# Patient Record
Sex: FEMALE | Race: WHITE | NOT HISPANIC OR LATINO | Employment: UNEMPLOYED | URBAN - METROPOLITAN AREA
[De-identification: names, ages, dates, MRNs, and addresses within clinical notes are randomized per-mention and may not be internally consistent; named-entity substitution may affect disease eponyms.]

---

## 2019-08-26 ENCOUNTER — TELEPHONE (OUTPATIENT)
Dept: URGENT CARE | Facility: CLINIC | Age: 54
End: 2019-08-26

## 2019-08-26 NOTE — TELEPHONE ENCOUNTER
Patient came in for TB read.  Pt waited 5 minutes and states she is going to sign her own form and left.      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

## 2020-08-05 ENCOUNTER — OFFICE VISIT (OUTPATIENT)
Dept: INTERNAL MEDICINE | Facility: CLINIC | Age: 55
End: 2020-08-05

## 2020-08-05 VITALS
HEIGHT: 64 IN | WEIGHT: 187 LBS | DIASTOLIC BLOOD PRESSURE: 90 MMHG | HEART RATE: 65 BPM | TEMPERATURE: 97.7 F | RESPIRATION RATE: 15 BRPM | SYSTOLIC BLOOD PRESSURE: 124 MMHG | BODY MASS INDEX: 31.92 KG/M2 | OXYGEN SATURATION: 97 %

## 2020-08-05 DIAGNOSIS — E11.9 TYPE 2 DIABETES MELLITUS WITHOUT COMPLICATION, WITHOUT LONG-TERM CURRENT USE OF INSULIN (HCC): Primary | ICD-10-CM

## 2020-08-05 DIAGNOSIS — I10 ESSENTIAL HYPERTENSION: ICD-10-CM

## 2020-08-05 DIAGNOSIS — Z23 NEED FOR PNEUMOCOCCAL VACCINATION: ICD-10-CM

## 2020-08-05 DIAGNOSIS — B00.1 HERPES LABIALIS: ICD-10-CM

## 2020-08-05 DIAGNOSIS — Z11.59 ENCOUNTER FOR HEPATITIS C SCREENING TEST FOR LOW RISK PATIENT: ICD-10-CM

## 2020-08-05 DIAGNOSIS — Z83.49 FAMILY HISTORY OF THYROID DISEASE IN MOTHER: ICD-10-CM

## 2020-08-05 LAB
A/C: NORMAL
ANION GAP SERPL CALCULATED.3IONS-SCNC: 13.8 MMOL/L (ref 5–15)
BASOPHILS # BLD AUTO: 0.05 10*3/MM3 (ref 0–0.2)
BASOPHILS NFR BLD AUTO: 0.6 % (ref 0–1.5)
BUN SERPL-MCNC: 9 MG/DL (ref 6–20)
BUN/CREAT SERPL: 12.5 (ref 7–25)
CALCIUM SPEC-SCNC: 9.3 MG/DL (ref 8.6–10.5)
CHLORIDE SERPL-SCNC: 96 MMOL/L (ref 98–107)
CO2 SERPL-SCNC: 27.2 MMOL/L (ref 22–29)
CREAT SERPL-MCNC: 0.72 MG/DL (ref 0.57–1)
DEPRECATED RDW RBC AUTO: 36.3 FL (ref 37–54)
EOSINOPHIL # BLD AUTO: 0.16 10*3/MM3 (ref 0–0.4)
EOSINOPHIL NFR BLD AUTO: 1.8 % (ref 0.3–6.2)
ERYTHROCYTE [DISTWIDTH] IN BLOOD BY AUTOMATED COUNT: 11.8 % (ref 12.3–15.4)
EXPIRATION DATE: NORMAL
EXPIRATION DATE: NORMAL
GFR SERPL CREATININE-BSD FRML MDRD: 85 ML/MIN/1.73
GLUCOSE SERPL-MCNC: 245 MG/DL (ref 65–99)
HBA1C MFR BLD: 8.3 %
HCT VFR BLD AUTO: 40.9 % (ref 34–46.6)
HCV AB SER DONR QL: NORMAL
HGB BLD-MCNC: 14.6 G/DL (ref 12–15.9)
IMM GRANULOCYTES # BLD AUTO: 0.04 10*3/MM3 (ref 0–0.05)
IMM GRANULOCYTES NFR BLD AUTO: 0.4 % (ref 0–0.5)
LYMPHOCYTES # BLD AUTO: 2.3 10*3/MM3 (ref 0.7–3.1)
LYMPHOCYTES NFR BLD AUTO: 25.6 % (ref 19.6–45.3)
Lab: 1061
Lab: NORMAL
MCH RBC QN AUTO: 30.3 PG (ref 26.6–33)
MCHC RBC AUTO-ENTMCNC: 35.7 G/DL (ref 31.5–35.7)
MCV RBC AUTO: 84.9 FL (ref 79–97)
MONOCYTES # BLD AUTO: 0.64 10*3/MM3 (ref 0.1–0.9)
MONOCYTES NFR BLD AUTO: 7.1 % (ref 5–12)
NEUTROPHILS NFR BLD AUTO: 5.78 10*3/MM3 (ref 1.7–7)
NEUTROPHILS NFR BLD AUTO: 64.5 % (ref 42.7–76)
NRBC BLD AUTO-RTO: 0 /100 WBC (ref 0–0.2)
PLATELET # BLD AUTO: 369 10*3/MM3 (ref 140–450)
PMV BLD AUTO: 11 FL (ref 6–12)
POC CREATININE URINE: 200
POC MICROALBUMIN URINE: 150
POTASSIUM SERPL-SCNC: 3.4 MMOL/L (ref 3.5–5.2)
RBC # BLD AUTO: 4.82 10*6/MM3 (ref 3.77–5.28)
SODIUM SERPL-SCNC: 137 MMOL/L (ref 136–145)
T4 FREE SERPL-MCNC: 1.27 NG/DL (ref 0.93–1.7)
TSH SERPL DL<=0.05 MIU/L-ACNC: 0.41 UIU/ML (ref 0.27–4.2)
WBC # BLD AUTO: 8.97 10*3/MM3 (ref 3.4–10.8)

## 2020-08-05 PROCEDURE — 80048 BASIC METABOLIC PNL TOTAL CA: CPT | Performed by: PHYSICIAN ASSISTANT

## 2020-08-05 PROCEDURE — 82044 UR ALBUMIN SEMIQUANTITATIVE: CPT | Performed by: PHYSICIAN ASSISTANT

## 2020-08-05 PROCEDURE — 84439 ASSAY OF FREE THYROXINE: CPT | Performed by: PHYSICIAN ASSISTANT

## 2020-08-05 PROCEDURE — 99204 OFFICE O/P NEW MOD 45 MIN: CPT | Performed by: PHYSICIAN ASSISTANT

## 2020-08-05 PROCEDURE — 83036 HEMOGLOBIN GLYCOSYLATED A1C: CPT | Performed by: PHYSICIAN ASSISTANT

## 2020-08-05 PROCEDURE — 90732 PPSV23 VACC 2 YRS+ SUBQ/IM: CPT | Performed by: PHYSICIAN ASSISTANT

## 2020-08-05 PROCEDURE — 85025 COMPLETE CBC W/AUTO DIFF WBC: CPT | Performed by: PHYSICIAN ASSISTANT

## 2020-08-05 PROCEDURE — 36415 COLL VENOUS BLD VENIPUNCTURE: CPT | Performed by: PHYSICIAN ASSISTANT

## 2020-08-05 PROCEDURE — 84443 ASSAY THYROID STIM HORMONE: CPT | Performed by: PHYSICIAN ASSISTANT

## 2020-08-05 PROCEDURE — 86803 HEPATITIS C AB TEST: CPT | Performed by: PHYSICIAN ASSISTANT

## 2020-08-05 PROCEDURE — 90471 IMMUNIZATION ADMIN: CPT | Performed by: PHYSICIAN ASSISTANT

## 2020-08-05 RX ORDER — DILTIAZEM HYDROCHLORIDE 300 MG/1
300 CAPSULE, COATED, EXTENDED RELEASE ORAL DAILY
Qty: 90 CAPSULE | Refills: 1 | Status: SHIPPED | OUTPATIENT
Start: 2020-08-05 | End: 2021-01-07 | Stop reason: SDUPTHER

## 2020-08-05 RX ORDER — DILTIAZEM HYDROCHLORIDE 300 MG/1
300 CAPSULE, COATED, EXTENDED RELEASE ORAL DAILY
COMMUNITY
End: 2020-08-05 | Stop reason: SDUPTHER

## 2020-08-05 RX ORDER — VENLAFAXINE HYDROCHLORIDE 150 MG/1
150 CAPSULE, EXTENDED RELEASE ORAL DAILY
Status: ON HOLD | COMMUNITY
End: 2020-12-03 | Stop reason: SDUPTHER

## 2020-08-05 RX ORDER — GLIPIZIDE 10 MG/1
10 TABLET, FILM COATED, EXTENDED RELEASE ORAL 2 TIMES DAILY
COMMUNITY
End: 2020-09-04 | Stop reason: SDUPTHER

## 2020-08-05 RX ORDER — VALACYCLOVIR HYDROCHLORIDE 500 MG/1
500 TABLET, FILM COATED ORAL 2 TIMES DAILY
Qty: 6 TABLET | Refills: 1 | Status: SHIPPED | OUTPATIENT
Start: 2020-08-05 | End: 2020-08-08

## 2020-08-05 RX ORDER — LOSARTAN POTASSIUM 25 MG/1
25 TABLET ORAL DAILY
Qty: 90 TABLET | Refills: 1 | Status: SHIPPED | OUTPATIENT
Start: 2020-08-05 | End: 2020-10-26 | Stop reason: DRUGHIGH

## 2020-08-05 RX ORDER — LOSARTAN POTASSIUM 25 MG/1
25 TABLET ORAL DAILY
COMMUNITY
End: 2020-08-05 | Stop reason: SDUPTHER

## 2020-08-05 NOTE — PROGRESS NOTES
Chief Complaint   Patient presents with   • Establish Care     Refill medication, Referral to Endocrinology   • Rash     x2 months, oral, nasal       Subjective       History of Present Illness     Rosanne Araya is a 54 y.o. female. She presents as a new patient to establish care. Current concerns include Type II DM, HTN, and fever blisters. Regarding Type II DM, pt is taking glipizide and metformin as directed. She denies headaches or foggy-headedness, vision change, abdominal pain, N/V/D, frequent urination or changes in urination or BMs, or neuropathy. Most recent HbA1c at 10.7 about 6 months ago, per pt. She would like a referral to a new endocrinologist. No new concerns.     Pt with HTN, currently taking losartan and diltiazem as directed. She does not routinely check her BP at home. She denies chest pain, SOA, HA, vision change or swelling of extremities. No new concerns.     She does have a new issue of fever blisters. Pt has had fever blisters in the past but not for several years. In the last 2 months she has had several fever blisters at her mouth as well as at L nasolabial fold. She has taken acyclovir in the past which improved her sx.     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 Lives at home with .       Are you currently seeing any other doctors or specialists? No   Are you currently taking any OTC medications or herbal medications? Vit D, Zinc, multivitamin     Sleep: 7 hours/ night   Diet: fairly healthy, per pt  Exercise: walking 2 miles daily     Most recent colonoscopy: 2018, normal per pt  Most recent mammogram: December 2019, [facility]  Most recent pap smear: 2016  First day of last menses: regular, consistent, LMP 7/25/2020. Has IUD in place     Regular dental visits: No  Regular eye exams: No, wears glasses only       The following portions of the patient's history were reviewed and updated as appropriate: allergies, current medications, past family  history, past medical history, past social history, past surgical history and problem list.    No Known Allergies  Social History     Tobacco Use   • Smoking status: Never Smoker   • Smokeless tobacco: Never Used   Substance Use Topics   • Alcohol use: Never     Frequency: Never     Past Surgical History:   Procedure Laterality Date   • ADENOIDECTOMY     • BACK SURGERY      disc slipped   •  SECTION     • TONSILLECTOMY     • WISDOM TOOTH EXTRACTION       Family History   Problem Relation Age of Onset   • Thyroid disease Mother    • Breast cancer Maternal Aunt    • Hypertension Maternal Grandmother    • Diabetes Maternal Grandmother    • Stroke Maternal Grandmother    • Heart attack Maternal Grandmother    • Hypertension Maternal Grandfather    • Diabetes Maternal Grandfather    • Hypertension Paternal Grandmother    • Diabetes Paternal Grandmother    • Heart failure Paternal Grandmother    • Hypertension Paternal Grandfather    • Heart attack Paternal Grandfather          Current Outpatient Medications:   •  dilTIAZem CD (CARDIZEM CD) 300 MG 24 hr capsule, Take 1 capsule by mouth Daily., Disp: 90 capsule, Rfl: 1  •  glipizide (GLUCOTROL XL) 10 MG 24 hr tablet, Take 10 mg by mouth 2 (two) times a day., Disp: , Rfl:   •  losartan (COZAAR) 25 MG tablet, Take 1 tablet by mouth Daily., Disp: 90 tablet, Rfl: 1  •  metFORMIN (GLUCOPHAGE) 1000 MG tablet, Take 1,000 mg by mouth 2 (Two) Times a Day With Meals., Disp: , Rfl:   •  venlafaxine XR (EFFEXOR-XR) 150 MG 24 hr capsule, Take 150 mg by mouth Daily., Disp: , Rfl:   •  valACYclovir (Valtrex) 500 MG tablet, Take 1 tablet by mouth 2 (Two) Times a Day for 3 days., Disp: 6 tablet, Rfl: 1    Patient Active Problem List   Diagnosis   • Type 2 diabetes mellitus without complication, without long-term current use of insulin (CMS/[facility])   • Essential hypertension   • Herpes labialis   • Family history of thyroid disease in mother       Review of Systems    Constitutional: Negative for chills, fatigue and fever.   HENT: Negative for congestion, ear pain and sore throat.    Eyes: Negative for pain and visual disturbance.   Respiratory: Negative for cough, shortness of breath and wheezing.    Cardiovascular: Negative for chest pain, palpitations and leg swelling.   Gastrointestinal: Negative for abdominal pain, diarrhea, nausea and vomiting.   Endocrine: Negative for polydipsia and polyuria.   Genitourinary: Negative for dysuria and hematuria.   Musculoskeletal: Negative for back pain.   Skin: Negative for rash.        +fever blisters   Allergic/Immunologic: Negative for immunocompromised state.   Neurological: Negative for dizziness, syncope, weakness, numbness and headache.   Psychiatric/Behavioral: Positive for depressed mood (well-controlled). The patient is not nervous/anxious.        Objective   Vitals:    08/05/20 1020   BP: 124/90   Pulse: 65   Resp: 15   Temp: 97.7 °F (36.5 °C)   SpO2: 97%     Physical Exam   Constitutional: She appears well-developed and well-nourished.   HENT:   Head: Normocephalic and atraumatic.   Right Ear: Tympanic membrane, external ear and ear canal normal.   Left Ear: Tympanic membrane, external ear and ear canal normal.   Mouth/Throat: Oropharynx is clear and moist and mucous membranes are normal.   +fever blisters x2 at upper lip, healing blister at L nasolabial fold.    Eyes: Conjunctivae are normal.   Neck: Neck supple. Carotid bruit is not present. No thyromegaly present.   Cardiovascular: Normal rate, regular rhythm and intact distal pulses.   No murmur heard.  Pulmonary/Chest: Effort normal and breath sounds normal. She has no wheezes. She has no rales.   Abdominal: Soft. Bowel sounds are normal. She exhibits no distension and no mass. There is no hepatosplenomegaly. There is no tenderness.   Lymphadenopathy:     She has no cervical adenopathy.   Skin: No rash noted.   Psychiatric: She has a normal mood and affect. Her behavior  is normal.             Assessment/Plan   Rosanne was seen today for establish care and rash.    Diagnoses and all orders for this visit:    Type 2 diabetes mellitus without complication, without long-term current use of insulin (CMS/[facility])  -     Basic Metabolic Panel  -     CBC & Differential  -     POC Glycosylated Hemoglobin (Hb A1C)  -     POC Microalbumin  -     CBC Auto Differential  -     Ambulatory Referral to Endocrinology    Essential hypertension  -     dilTIAZem CD (CARDIZEM CD) 300 MG 24 hr capsule; Take 1 capsule by mouth Daily.  -     losartan (COZAAR) 25 MG tablet; Take 1 tablet by mouth Daily.  -     Basic Metabolic Panel  -     CBC & Differential  -     TSH  -     T4, Free  -     CBC Auto Differential    Herpes labialis  -     valACYclovir (Valtrex) 500 MG tablet; Take 1 tablet by mouth 2 (Two) Times a Day for 3 days.    Need for pneumococcal vaccination  -     Pneumococcal Polysaccharide Vaccine 23-Valent Greater Than or Equal To 3yo Subcutaneous / IM    Family history of thyroid disease in mother  -     TSH  -     T4, Free    Encounter for hepatitis C screening test for low risk patient  -     Hepatitis C Antibody      Will request most recent mammogram and colonoscopy reports for our records.   Continue all routine medications.  Valtrex as directed for PRN use with fever blisters.   PPSV23 updated today.   Further plans after review of labs.            Return in about 4 months (around 12/5/2020) for Annual physical- fasting labs.

## 2020-08-06 ENCOUNTER — TELEPHONE (OUTPATIENT)
Dept: INTERNAL MEDICINE | Facility: CLINIC | Age: 55
End: 2020-08-06

## 2020-08-06 NOTE — TELEPHONE ENCOUNTER
Please call pt- her A1c has improved to 8.3 which is great news. We will still have her follow up with endocrinology.   Otherwise all labs stable.

## 2020-08-06 NOTE — TELEPHONE ENCOUNTER
Rosanne Araya [phone]  [facility] advising of clinical message listed below. PER FANNIE. Office number given for any questions or concerns, [phone].

## 2020-09-04 DIAGNOSIS — E11.9 TYPE 2 DIABETES MELLITUS WITHOUT COMPLICATION, WITHOUT LONG-TERM CURRENT USE OF INSULIN (HCC): Primary | ICD-10-CM

## 2020-09-04 RX ORDER — GLIPIZIDE 10 MG/1
10 TABLET, FILM COATED, EXTENDED RELEASE ORAL 2 TIMES DAILY
Qty: 60 TABLET | Refills: 1 | Status: SHIPPED | OUTPATIENT
Start: 2020-09-04 | End: 2020-10-26 | Stop reason: SDUPTHER

## 2020-09-04 NOTE — TELEPHONE ENCOUNTER
DELETE AFTER REVIEWING: Telephone encounter to be sent to the clinical pool.  If patient has less than a 3 day supply left, send the encounter HIGH Priority.    Caller: Rosanne Araya    Relationship: Self    Best call back number: [phone]    Medication needed:   Requested Prescriptions     Pending Prescriptions Disp Refills   • glipizide (GLUCOTROL XL) 10 MG 24 hr tablet       Sig: Take 1 tablet by mouth 2 (two) times a day.   • metFORMIN (GLUCOPHAGE) 1000 MG tablet       Sig: Take 1 tablet by mouth 2 (Two) Times a Day With Meals.       When do you need the refill by: TODAY    What details did the patient provide when requesting the medication: PATIENT STATED HER ENDOCRINE APPOINTMENT WILL NOT BE UNTIL NOV 4TH SO SHE WILL NEED ANOTHER REFIL OF THESE PRESCRIPTIONS    Does the patient have less than a 3 day supply:  [] Yes  [x] No    What is the patient's preferred pharmacy:    [facility] [address] [place] [address] [phone] [place] [phone]

## 2020-10-26 ENCOUNTER — OFFICE VISIT (OUTPATIENT)
Dept: ENDOCRINOLOGY | Facility: CLINIC | Age: 55
End: 2020-10-26

## 2020-10-26 ENCOUNTER — LAB (OUTPATIENT)
Dept: LAB | Facility: HOSPITAL | Age: 55
End: 2020-10-26

## 2020-10-26 VITALS
WEIGHT: 185.6 LBS | BODY MASS INDEX: 32.36 KG/M2 | DIASTOLIC BLOOD PRESSURE: 84 MMHG | HEART RATE: 76 BPM | OXYGEN SATURATION: 98 % | SYSTOLIC BLOOD PRESSURE: 122 MMHG

## 2020-10-26 DIAGNOSIS — E11.29 MICROALBUMINURIA DUE TO TYPE 2 DIABETES MELLITUS (HCC): ICD-10-CM

## 2020-10-26 DIAGNOSIS — E11.65 UNCONTROLLED TYPE 2 DIABETES MELLITUS WITH HYPERGLYCEMIA (HCC): Primary | ICD-10-CM

## 2020-10-26 DIAGNOSIS — R80.9 MICROALBUMINURIA DUE TO TYPE 2 DIABETES MELLITUS (HCC): ICD-10-CM

## 2020-10-26 LAB
ALBUMIN SERPL-MCNC: 4.2 G/DL (ref 3.5–5.2)
ALBUMIN/GLOB SERPL: 1.4 G/DL
ALP SERPL-CCNC: 102 U/L (ref 39–117)
ALT SERPL W P-5'-P-CCNC: 10 U/L (ref 1–33)
ANION GAP SERPL CALCULATED.3IONS-SCNC: 11.9 MMOL/L (ref 5–15)
AST SERPL-CCNC: 17 U/L (ref 1–32)
BILIRUB SERPL-MCNC: 0.3 MG/DL (ref 0–1.2)
BUN SERPL-MCNC: 11 MG/DL (ref 6–20)
BUN/CREAT SERPL: 15.1 (ref 7–25)
CALCIUM SPEC-SCNC: 9.4 MG/DL (ref 8.6–10.5)
CHLORIDE SERPL-SCNC: 98 MMOL/L (ref 98–107)
CHOLEST SERPL-MCNC: 234 MG/DL (ref 0–200)
CO2 SERPL-SCNC: 29.1 MMOL/L (ref 22–29)
CREAT SERPL-MCNC: 0.73 MG/DL (ref 0.57–1)
GFR SERPL CREATININE-BSD FRML MDRD: 83 ML/MIN/1.73
GLOBULIN UR ELPH-MCNC: 3 GM/DL
GLUCOSE BLDC GLUCOMTR-MCNC: 212 MG/DL (ref 70–130)
GLUCOSE SERPL-MCNC: 219 MG/DL (ref 65–99)
HBA1C MFR BLD: 8.7 %
HDLC SERPL-MCNC: 37 MG/DL (ref 40–60)
LDLC SERPL CALC-MCNC: 158 MG/DL (ref 0–100)
LDLC/HDLC SERPL: 4.19 {RATIO}
POTASSIUM SERPL-SCNC: 3.9 MMOL/L (ref 3.5–5.2)
PROT SERPL-MCNC: 7.2 G/DL (ref 6–8.5)
SODIUM SERPL-SCNC: 139 MMOL/L (ref 136–145)
TRIGL SERPL-MCNC: 209 MG/DL (ref 0–150)
VLDLC SERPL-MCNC: 39 MG/DL (ref 5–40)

## 2020-10-26 PROCEDURE — 80061 LIPID PANEL: CPT | Performed by: PHYSICIAN ASSISTANT

## 2020-10-26 PROCEDURE — 99215 OFFICE O/P EST HI 40 MIN: CPT | Performed by: PHYSICIAN ASSISTANT

## 2020-10-26 PROCEDURE — 80053 COMPREHEN METABOLIC PANEL: CPT | Performed by: PHYSICIAN ASSISTANT

## 2020-10-26 PROCEDURE — 83036 HEMOGLOBIN GLYCOSYLATED A1C: CPT | Performed by: PHYSICIAN ASSISTANT

## 2020-10-26 PROCEDURE — 82947 ASSAY GLUCOSE BLOOD QUANT: CPT | Performed by: PHYSICIAN ASSISTANT

## 2020-10-26 RX ORDER — LANCETS 30 GAUGE
EACH MISCELLANEOUS
Qty: 100 EACH | Refills: 11 | Status: SHIPPED | OUTPATIENT
Start: 2020-10-26 | End: 2022-02-08 | Stop reason: SDUPTHER

## 2020-10-26 RX ORDER — GLIPIZIDE 10 MG/1
10 TABLET, FILM COATED, EXTENDED RELEASE ORAL 2 TIMES DAILY
Qty: 180 TABLET | Refills: 1 | Status: SHIPPED | OUTPATIENT
Start: 2020-10-26 | End: 2021-01-07 | Stop reason: SDUPTHER

## 2020-10-26 RX ORDER — SEMAGLUTIDE 1.34 MG/ML
INJECTION, SOLUTION SUBCUTANEOUS
Qty: 3 PEN | Refills: 1 | Status: SHIPPED | OUTPATIENT
Start: 2020-10-26 | End: 2021-02-19

## 2020-10-26 RX ORDER — LOSARTAN POTASSIUM 100 MG/1
100 TABLET ORAL DAILY
COMMUNITY
Start: 2020-07-25 | End: 2021-01-07 | Stop reason: SDUPTHER

## 2020-10-26 RX ORDER — BLOOD-GLUCOSE METER
KIT MISCELLANEOUS
Qty: 1 EACH | Refills: 0 | Status: SHIPPED | OUTPATIENT
Start: 2020-10-26 | End: 2022-02-08 | Stop reason: SDUPTHER

## 2020-10-26 NOTE — PROGRESS NOTES
"Chief Complaint  Establish care for Diabetes Mellitus.    HPI   Rosanne Araya is a 55 y.o. female who is here today for evaluation of Diabetes Mellitus type 2. The initial diagnosis of diabetes was made 2010. Had GDM 2009.     A1C- 8.7 (10/26/2020), 8.3 (8/5/2020)    Diabetic complications: none  Eye exam current (within one year): utd, 11/2019, at   Foot care and dental care: discussed    Current diabetic medications include:  Metformin 1000mg BID  Glipizide XL 10mg BID    Statin: no    Past medications: po meds, unsure of name    Diabetic Monitoring  - no meter or log for review. Not checking. Does not have a glucometer. Denies hypoglycemia    Nutrition:     Current diet: in general, an \"unhealthy\" diet, on average, 2-3 meals per day  Current exercise: none    The following portions of the patient's history were reviewed and updated by me as appropriate: allergies, current medications, past family history, past social history, past surgical history and problem list.    Past Medical History:   Diagnosis Date   • Depression    • Diabetes mellitus (CMS/HCC)    • Herpes    • Hypertension        Medications    Current Outpatient Medications:   •  dilTIAZem CD (CARDIZEM CD) 300 MG 24 hr capsule, Take 1 capsule by mouth Daily., Disp: 90 capsule, Rfl: 1  •  glipizide (GLUCOTROL XL) 10 MG 24 hr tablet, Take 1 tablet by mouth 2 (two) times a day., Disp: 180 tablet, Rfl: 1  •  losartan (COZAAR) 100 MG tablet, Take 100 mg by mouth Daily., Disp: , Rfl:   •  metFORMIN (GLUCOPHAGE) 1000 MG tablet, Take 1 tablet by mouth 2 (Two) Times a Day With Meals., Disp: 180 tablet, Rfl: 1  •  venlafaxine XR (EFFEXOR-XR) 150 MG 24 hr capsule, Take 150 mg by mouth Daily., Disp: , Rfl:   •  atorvastatin (Lipitor) 40 MG tablet, Take 1 tablet by mouth Every Night., Disp: 90 tablet, Rfl: 1  •  glucose blood test strip, Check BS BID, Disp: 100 each, Rfl: 11  •  glucose monitor monitoring kit, Use BID to check BS, Disp: 1 each, Rfl: 0  •  " Lancets misc, Check BS BID, Disp: 100 each, Rfl: 11  •  Semaglutide,0.25 or 0.5MG/DOS, (Ozempic, 0.25 or 0.5 MG/DOSE,) 2 MG/1.5ML solution pen-injector, Start with 0.25mg sc weekly x 4 weeks then increase to 0.5mg weekly, Disp: 3 pen, Rfl: 1    Review of Systems  Review of Systems   All other systems reviewed and are negative.       Physical Exam    /84   Pulse 76   Wt 84.2 kg (185 lb 9.6 oz)   SpO2 98%   BMI 32.36 kg/m² Body mass index is 32.36 kg/m².  Physical Exam  Constitutional:       General: She is not in acute distress.     Appearance: She is well-developed. She is not diaphoretic.   HENT:      Head: Normocephalic.      Right Ear: External ear normal.      Left Ear: External ear normal.      Mouth/Throat:      Pharynx: No oropharyngeal exudate.   Eyes:      General: Lids are normal.         Right eye: No discharge.         Left eye: No discharge.      Conjunctiva/sclera: Conjunctivae normal.      Pupils:      Right eye: Pupil is reactive.      Left eye: Pupil is reactive.   Neck:      Thyroid: No thyroid mass or thyromegaly.      Vascular: No JVD.      Trachea: No tracheal deviation.   Cardiovascular:      Rate and Rhythm: Normal rate and regular rhythm.      Pulses:           Dorsalis pedis pulses are 2+ on the right side and 2+ on the left side.        Posterior tibial pulses are 2+ on the right side and 2+ on the left side.      Heart sounds: Normal heart sounds. No murmur.   Pulmonary:      Effort: Pulmonary effort is normal. No respiratory distress.      Breath sounds: Normal breath sounds. No wheezing.   Abdominal:      General: Bowel sounds are normal.      Palpations: Abdomen is soft.      Tenderness: There is no abdominal tenderness.   Musculoskeletal:         General: No tenderness.      Right foot: No deformity or Charcot foot.      Left foot: No deformity or Charcot foot.   Feet:      Right foot:      Protective Sensation: 5 sites tested. 5 sites sensed.      Skin integrity: No ulcer,  blister, skin breakdown, erythema, warmth or callus.      Left foot:      Protective Sensation: 3 sites tested. 5 sites sensed.      Skin integrity: No ulcer, blister, skin breakdown, erythema, warmth or callus.      Comments: Diabetic Foot Exam Performed and Monofilament Test Performed      Lymphadenopathy:      Cervical: No cervical adenopathy.   Skin:     General: Skin is warm and dry.      Findings: No erythema or rash.   Neurological:      Mental Status: She is alert and oriented to person, place, and time.   Psychiatric:         Speech: Speech normal.         Behavior: Behavior normal.         Thought Content: Thought content normal.         Labs and Imaging   Lab Results   Component Value Date    HGBA1C 8.7 10/26/2020    HGBA1C 8.3 08/05/2020       Assessment / Plan   Diagnoses and all orders for this visit:    1. Uncontrolled type 2 diabetes mellitus with hyperglycemia (CMS/HCC) (Primary)  -     POC Glycosylated Hemoglobin (Hb A1C)  -     POC Glucose Fingerstick  -     Lipid Panel  -     Comprehensive Metabolic Panel  -     Cancel: Microalbumin / Creatinine Urine Ratio - Urine, Clean Catch  -     glucose monitor monitoring kit; Use BID to check BS  Dispense: 1 each; Refill: 0  -     Lancets misc; Check BS BID  Dispense: 100 each; Refill: 11  -     glucose blood test strip; Check BS BID  Dispense: 100 each; Refill: 11  -     Semaglutide,0.25 or 0.5MG/DOS, (Ozempic, 0.25 or 0.5 MG/DOSE,) 2 MG/1.5ML solution pen-injector; Start with 0.25mg sc weekly x 4 weeks then increase to 0.5mg weekly  Dispense: 3 pen; Refill: 1  -     glipizide (GLUCOTROL XL) 10 MG 24 hr tablet; Take 1 tablet by mouth 2 (two) times a day.  Dispense: 180 tablet; Refill: 1  -     metFORMIN (GLUCOPHAGE) 1000 MG tablet; Take 1 tablet by mouth 2 (Two) Times a Day With Meals.  Dispense: 180 tablet; Refill: 1    2. Microalbuminuria due to type 2 diabetes mellitus (CMS/HCC)        Diabetes Mellitus 2 is under poor control.  -A1c 8.7  -discussed  A1C goal <7, FBS <120, 2 hours post prandial BS <180  -we discussed lifestyle modification is essential for diabetes control. Discussed diet and which foods to focus on  -add ozempic 0.25mg sc weekly x 4 weeks then increase to 0.5mg weekly. Risks reviewed. administration reviewed. copay card and sample provided.   -continue metformin, glipizide    1.  Diet: 3-4 carb servings per meal for females, 4-5 carb servings per meal for males  Spread carb intake throughout the day  Increase lean protein and vegetable intake  Avoid sugary drinks and processed carbs including crackers, cookies, cakes  2.  Exercise: Recommend at least 30 minutes of exercise daily, at least 5 days per week. Increase exercise gradually.   3.  Blood Glucose Goal: Blood glucose goal <150 fasting, <180 2 hr postprandial  4.  Microalbumin due 8/2021  5.  Education performed during this visit: long term diabetic complications discussed. , annual eye examinations at Ophthalmology discussed, dental hygiene discussed  and foot care reviewed., home glucose monitoring emphasized, all medications, side effects and compliance discussed carefully and Hypoglycemia management and prevention reviewed. Reviewed ‘ABCs’ of diabetes management (respective goals in parentheses):  A1C (<7), blood pressure (<130/80), and cholesterol (LDL <100, if CVD <70).    Microalbuminuria  -repeat yearly  -she is on losartan    There are no Patient Instructions on file for this visit.    Follow up: Return in about 3 months (around 1/26/2021).    Discussed the nature of the disease including, risks, complications, implications, management, safe and proper use of medications. Encouraged therapeutic lifestyle changes including low calorie diet with plenty of fruits and vegetables, daily exercise, medication compliance, and keeping scheduled follow up appointments with me and any other providers. Encouraged patient to have appointment for complete physical, fasting labs, appropriate  screenings, and immunizations on an annual basis.    30 min  of 40 min face-to-face visit time spent for coordination of care and counselling regarding identified problems as outlined in the objective, assessment and discussion portions of the documentation.    Signed by: Addie Mckeon PA-C

## 2020-10-27 RX ORDER — ATORVASTATIN CALCIUM 40 MG/1
40 TABLET, FILM COATED ORAL NIGHTLY
Qty: 90 TABLET | Refills: 1 | Status: ON HOLD | OUTPATIENT
Start: 2020-10-27 | End: 2020-12-03 | Stop reason: SDUPTHER

## 2020-11-26 ENCOUNTER — APPOINTMENT (OUTPATIENT)
Dept: GENERAL RADIOLOGY | Facility: HOSPITAL | Age: 55
End: 2020-11-26

## 2020-11-26 ENCOUNTER — HOSPITAL ENCOUNTER (INPATIENT)
Facility: HOSPITAL | Age: 55
LOS: 7 days | Discharge: NURSING, REHAB OR LONG-TERM CARE | End: 2020-12-03
Attending: EMERGENCY MEDICINE | Admitting: INTERNAL MEDICINE

## 2020-11-26 ENCOUNTER — APPOINTMENT (OUTPATIENT)
Dept: CT IMAGING | Facility: HOSPITAL | Age: 55
End: 2020-11-26

## 2020-11-26 ENCOUNTER — APPOINTMENT (OUTPATIENT)
Dept: MRI IMAGING | Facility: HOSPITAL | Age: 55
End: 2020-11-26

## 2020-11-26 DIAGNOSIS — I63.9 ACUTE CVA (CEREBROVASCULAR ACCIDENT) (HCC): Primary | ICD-10-CM

## 2020-11-26 DIAGNOSIS — E11.9 TYPE 2 DIABETES MELLITUS WITHOUT COMPLICATION, WITHOUT LONG-TERM CURRENT USE OF INSULIN (HCC): ICD-10-CM

## 2020-11-26 DIAGNOSIS — I10 ESSENTIAL HYPERTENSION: ICD-10-CM

## 2020-11-26 DIAGNOSIS — E78.00 HYPERCHOLESTEROLEMIA: ICD-10-CM

## 2020-11-26 PROBLEM — E87.6 HYPOKALEMIA: Status: ACTIVE | Noted: 2020-11-26

## 2020-11-26 PROBLEM — R11.0 NAUSEA: Status: ACTIVE | Noted: 2020-11-26

## 2020-11-26 LAB
ALBUMIN SERPL-MCNC: 4.4 G/DL (ref 3.5–5.2)
ALBUMIN/GLOB SERPL: 1.3 G/DL
ALP SERPL-CCNC: 88 U/L (ref 39–117)
ALT SERPL W P-5'-P-CCNC: 8 U/L (ref 1–33)
ALT SERPL W P-5'-P-CCNC: <5 U/L (ref 1–33)
ANION GAP SERPL CALCULATED.3IONS-SCNC: 13 MMOL/L (ref 5–15)
APTT PPP: 30.2 SECONDS (ref 24–37)
AST SERPL-CCNC: 19 U/L (ref 1–32)
AST SERPL-CCNC: 20 U/L (ref 1–32)
B PARAPERT DNA SPEC QL NAA+PROBE: NOT DETECTED
B PERT DNA SPEC QL NAA+PROBE: NOT DETECTED
BASOPHILS # BLD AUTO: 0.06 10*3/MM3 (ref 0–0.2)
BASOPHILS NFR BLD AUTO: 0.5 % (ref 0–1.5)
BILIRUB SERPL-MCNC: 0.6 MG/DL (ref 0–1.2)
BUN SERPL-MCNC: 13 MG/DL (ref 6–20)
BUN/CREAT SERPL: 15.9 (ref 7–25)
C PNEUM DNA NPH QL NAA+NON-PROBE: NOT DETECTED
CALCIUM SPEC-SCNC: 9.8 MG/DL (ref 8.6–10.5)
CHLORIDE SERPL-SCNC: 97 MMOL/L (ref 98–107)
CO2 SERPL-SCNC: 29 MMOL/L (ref 22–29)
CREAT SERPL-MCNC: 0.82 MG/DL (ref 0.57–1)
DEPRECATED RDW RBC AUTO: 36.7 FL (ref 37–54)
EOSINOPHIL # BLD AUTO: 0.51 10*3/MM3 (ref 0–0.4)
EOSINOPHIL NFR BLD AUTO: 4.7 % (ref 0.3–6.2)
ERYTHROCYTE [DISTWIDTH] IN BLOOD BY AUTOMATED COUNT: 11.6 % (ref 12.3–15.4)
FLUAV H1 2009 PAND RNA NPH QL NAA+PROBE: NOT DETECTED
FLUAV H1 HA GENE NPH QL NAA+PROBE: NOT DETECTED
FLUAV H3 RNA NPH QL NAA+PROBE: NOT DETECTED
FLUAV SUBTYP SPEC NAA+PROBE: NOT DETECTED
FLUBV RNA ISLT QL NAA+PROBE: NOT DETECTED
GFR SERPL CREATININE-BSD FRML MDRD: 73 ML/MIN/1.73
GLOBULIN UR ELPH-MCNC: 3.4 GM/DL
GLUCOSE BLDC GLUCOMTR-MCNC: 79 MG/DL (ref 70–130)
GLUCOSE BLDC GLUCOMTR-MCNC: 83 MG/DL (ref 70–130)
GLUCOSE SERPL-MCNC: 124 MG/DL (ref 65–99)
HADV DNA SPEC NAA+PROBE: NOT DETECTED
HCOV 229E RNA SPEC QL NAA+PROBE: NOT DETECTED
HCOV HKU1 RNA SPEC QL NAA+PROBE: NOT DETECTED
HCOV NL63 RNA SPEC QL NAA+PROBE: NOT DETECTED
HCOV OC43 RNA SPEC QL NAA+PROBE: NOT DETECTED
HCT VFR BLD AUTO: 41.3 % (ref 34–46.6)
HGB BLD-MCNC: 14.3 G/DL (ref 12–15.9)
HMPV RNA NPH QL NAA+NON-PROBE: NOT DETECTED
HOLD SPECIMEN: NORMAL
HOLD SPECIMEN: NORMAL
HPIV1 RNA SPEC QL NAA+PROBE: NOT DETECTED
HPIV2 RNA SPEC QL NAA+PROBE: NOT DETECTED
HPIV3 RNA NPH QL NAA+PROBE: NOT DETECTED
HPIV4 P GENE NPH QL NAA+PROBE: NOT DETECTED
IMM GRANULOCYTES # BLD AUTO: 0.03 10*3/MM3 (ref 0–0.05)
IMM GRANULOCYTES NFR BLD AUTO: 0.3 % (ref 0–0.5)
INR PPP: 1.02 (ref 0.85–1.16)
INR PPP: 1.2 (ref 0.8–1.2)
LYMPHOCYTES # BLD AUTO: 3.26 10*3/MM3 (ref 0.7–3.1)
LYMPHOCYTES NFR BLD AUTO: 29.8 % (ref 19.6–45.3)
M PNEUMO IGG SER IA-ACNC: NOT DETECTED
MAGNESIUM SERPL-MCNC: 1.6 MG/DL (ref 1.6–2.6)
MCH RBC QN AUTO: 30 PG (ref 26.6–33)
MCHC RBC AUTO-ENTMCNC: 34.6 G/DL (ref 31.5–35.7)
MCV RBC AUTO: 86.8 FL (ref 79–97)
MONOCYTES # BLD AUTO: 0.8 10*3/MM3 (ref 0.1–0.9)
MONOCYTES NFR BLD AUTO: 7.3 % (ref 5–12)
NEUTROPHILS NFR BLD AUTO: 57.4 % (ref 42.7–76)
NEUTROPHILS NFR BLD AUTO: 6.27 10*3/MM3 (ref 1.7–7)
NRBC BLD AUTO-RTO: 0 /100 WBC (ref 0–0.2)
PLATELET # BLD AUTO: 358 10*3/MM3 (ref 140–450)
PMV BLD AUTO: 10.2 FL (ref 6–12)
POTASSIUM SERPL-SCNC: 3.2 MMOL/L (ref 3.5–5.2)
PROT SERPL-MCNC: 7.8 G/DL (ref 6–8.5)
PROTHROMBIN TIME: 13.1 SECONDS (ref 11.5–14)
PROTHROMBIN TIME: 14.5 SECONDS (ref 12.8–15.2)
QT INTERVAL: 452 MS
QTC INTERVAL: 518 MS
RBC # BLD AUTO: 4.76 10*6/MM3 (ref 3.77–5.28)
RHINOVIRUS RNA SPEC NAA+PROBE: NOT DETECTED
RSV RNA NPH QL NAA+NON-PROBE: NOT DETECTED
SARS-COV-2 RNA NPH QL NAA+NON-PROBE: NOT DETECTED
SODIUM SERPL-SCNC: 139 MMOL/L (ref 136–145)
TROPONIN T SERPL-MCNC: <0.01 NG/ML (ref 0–0.03)
UFH PPP CHRO-ACNC: 0.1 IU/ML (ref 0.3–0.7)
UFH PPP CHRO-ACNC: 0.1 IU/ML (ref 0.3–0.7)
WBC # BLD AUTO: 10.93 10*3/MM3 (ref 3.4–10.8)
WHOLE BLOOD HOLD SPECIMEN: NORMAL
WHOLE BLOOD HOLD SPECIMEN: NORMAL

## 2020-11-26 PROCEDURE — 82565 ASSAY OF CREATININE: CPT

## 2020-11-26 PROCEDURE — 99285 EMERGENCY DEPT VISIT HI MDM: CPT

## 2020-11-26 PROCEDURE — 0 IOPAMIDOL PER 1 ML: Performed by: EMERGENCY MEDICINE

## 2020-11-26 PROCEDURE — 70551 MRI BRAIN STEM W/O DYE: CPT

## 2020-11-26 PROCEDURE — 84450 TRANSFERASE (AST) (SGOT): CPT | Performed by: EMERGENCY MEDICINE

## 2020-11-26 PROCEDURE — 70450 CT HEAD/BRAIN W/O DYE: CPT

## 2020-11-26 PROCEDURE — 84460 ALANINE AMINO (ALT) (SGPT): CPT | Performed by: EMERGENCY MEDICINE

## 2020-11-26 PROCEDURE — 70498 CT ANGIOGRAPHY NECK: CPT

## 2020-11-26 PROCEDURE — 80053 COMPREHEN METABOLIC PANEL: CPT | Performed by: EMERGENCY MEDICINE

## 2020-11-26 PROCEDURE — 85610 PROTHROMBIN TIME: CPT

## 2020-11-26 PROCEDURE — 99254 IP/OBS CNSLTJ NEW/EST MOD 60: CPT | Performed by: NURSE PRACTITIONER

## 2020-11-26 PROCEDURE — 82962 GLUCOSE BLOOD TEST: CPT

## 2020-11-26 PROCEDURE — 85610 PROTHROMBIN TIME: CPT | Performed by: STUDENT IN AN ORGANIZED HEALTH CARE EDUCATION/TRAINING PROGRAM

## 2020-11-26 PROCEDURE — 85730 THROMBOPLASTIN TIME PARTIAL: CPT | Performed by: EMERGENCY MEDICINE

## 2020-11-26 PROCEDURE — 71045 X-RAY EXAM CHEST 1 VIEW: CPT

## 2020-11-26 PROCEDURE — 0202U NFCT DS 22 TRGT SARS-COV-2: CPT | Performed by: INTERNAL MEDICINE

## 2020-11-26 PROCEDURE — 85520 HEPARIN ASSAY: CPT

## 2020-11-26 PROCEDURE — 70496 CT ANGIOGRAPHY HEAD: CPT

## 2020-11-26 PROCEDURE — 99223 1ST HOSP IP/OBS HIGH 75: CPT | Performed by: INTERNAL MEDICINE

## 2020-11-26 PROCEDURE — 25010000002 HEPARIN (PORCINE) 25000-0.45 UT/250ML-% SOLUTION: Performed by: STUDENT IN AN ORGANIZED HEALTH CARE EDUCATION/TRAINING PROGRAM

## 2020-11-26 PROCEDURE — 0042T HC CT CEREBRAL PERFUSION W/WO CONTRAST: CPT

## 2020-11-26 PROCEDURE — 85520 HEPARIN ASSAY: CPT | Performed by: STUDENT IN AN ORGANIZED HEALTH CARE EDUCATION/TRAINING PROGRAM

## 2020-11-26 PROCEDURE — 93005 ELECTROCARDIOGRAM TRACING: CPT | Performed by: EMERGENCY MEDICINE

## 2020-11-26 PROCEDURE — 84484 ASSAY OF TROPONIN QUANT: CPT | Performed by: EMERGENCY MEDICINE

## 2020-11-26 PROCEDURE — 85025 COMPLETE CBC W/AUTO DIFF WBC: CPT | Performed by: EMERGENCY MEDICINE

## 2020-11-26 PROCEDURE — 83735 ASSAY OF MAGNESIUM: CPT | Performed by: INTERNAL MEDICINE

## 2020-11-26 RX ORDER — POTASSIUM CHLORIDE 750 MG/1
40 CAPSULE, EXTENDED RELEASE ORAL AS NEEDED
Status: DISCONTINUED | OUTPATIENT
Start: 2020-11-26 | End: 2020-12-03 | Stop reason: HOSPADM

## 2020-11-26 RX ORDER — FAMOTIDINE 20 MG/1
20 TABLET, FILM COATED ORAL
Status: DISCONTINUED | OUTPATIENT
Start: 2020-11-27 | End: 2020-12-03 | Stop reason: HOSPADM

## 2020-11-26 RX ORDER — ACETAMINOPHEN 325 MG/1
650 TABLET ORAL EVERY 6 HOURS PRN
Status: DISCONTINUED | OUTPATIENT
Start: 2020-11-26 | End: 2020-12-03 | Stop reason: HOSPADM

## 2020-11-26 RX ORDER — CLOPIDOGREL BISULFATE 75 MG/1
300 TABLET ORAL ONCE
Status: COMPLETED | OUTPATIENT
Start: 2020-11-26 | End: 2020-11-26

## 2020-11-26 RX ORDER — ATORVASTATIN CALCIUM 40 MG/1
80 TABLET, FILM COATED ORAL NIGHTLY
Status: DISCONTINUED | OUTPATIENT
Start: 2020-11-26 | End: 2020-12-03 | Stop reason: HOSPADM

## 2020-11-26 RX ORDER — ASPIRIN 300 MG/1
300 SUPPOSITORY RECTAL DAILY
Status: DISCONTINUED | OUTPATIENT
Start: 2020-11-26 | End: 2020-11-26

## 2020-11-26 RX ORDER — SODIUM CHLORIDE 9 MG/ML
75 INJECTION, SOLUTION INTRAVENOUS CONTINUOUS
Status: DISCONTINUED | OUTPATIENT
Start: 2020-11-26 | End: 2020-11-27

## 2020-11-26 RX ORDER — POTASSIUM CHLORIDE 1.5 G/1.77G
40 POWDER, FOR SOLUTION ORAL AS NEEDED
Status: DISCONTINUED | OUTPATIENT
Start: 2020-11-26 | End: 2020-12-03 | Stop reason: HOSPADM

## 2020-11-26 RX ORDER — VENLAFAXINE HYDROCHLORIDE 75 MG/1
150 CAPSULE, EXTENDED RELEASE ORAL DAILY
Status: DISCONTINUED | OUTPATIENT
Start: 2020-11-27 | End: 2020-11-28

## 2020-11-26 RX ORDER — POTASSIUM CHLORIDE 7.45 MG/ML
10 INJECTION INTRAVENOUS
Status: DISCONTINUED | OUTPATIENT
Start: 2020-11-26 | End: 2020-12-03 | Stop reason: HOSPADM

## 2020-11-26 RX ORDER — SODIUM CHLORIDE 0.9 % (FLUSH) 0.9 %
10 SYRINGE (ML) INJECTION EVERY 12 HOURS SCHEDULED
Status: DISCONTINUED | OUTPATIENT
Start: 2020-11-26 | End: 2020-12-03 | Stop reason: HOSPADM

## 2020-11-26 RX ORDER — ASPIRIN 81 MG/1
81 TABLET, CHEWABLE ORAL DAILY
Status: DISCONTINUED | OUTPATIENT
Start: 2020-11-26 | End: 2020-11-26

## 2020-11-26 RX ORDER — HEPARIN SODIUM 10000 [USP'U]/100ML
18 INJECTION, SOLUTION INTRAVENOUS
Status: DISCONTINUED | OUTPATIENT
Start: 2020-11-26 | End: 2020-11-27

## 2020-11-26 RX ORDER — DEXTROSE MONOHYDRATE 25 G/50ML
25 INJECTION, SOLUTION INTRAVENOUS
Status: DISCONTINUED | OUTPATIENT
Start: 2020-11-26 | End: 2020-12-03 | Stop reason: HOSPADM

## 2020-11-26 RX ORDER — CLOPIDOGREL BISULFATE 75 MG/1
75 TABLET ORAL DAILY
Status: DISCONTINUED | OUTPATIENT
Start: 2020-11-27 | End: 2020-12-03 | Stop reason: HOSPADM

## 2020-11-26 RX ORDER — SODIUM CHLORIDE 0.9 % (FLUSH) 0.9 %
10 SYRINGE (ML) INJECTION AS NEEDED
Status: DISCONTINUED | OUTPATIENT
Start: 2020-11-26 | End: 2020-12-03 | Stop reason: HOSPADM

## 2020-11-26 RX ORDER — NICOTINE POLACRILEX 4 MG
15 LOZENGE BUCCAL
Status: DISCONTINUED | OUTPATIENT
Start: 2020-11-26 | End: 2020-12-03 | Stop reason: HOSPADM

## 2020-11-26 RX ADMIN — ACETAMINOPHEN 650 MG: 325 TABLET, FILM COATED ORAL at 20:51

## 2020-11-26 RX ADMIN — ATORVASTATIN CALCIUM 80 MG: 40 TABLET, FILM COATED ORAL at 20:51

## 2020-11-26 RX ADMIN — SODIUM CHLORIDE 500 ML: 9 INJECTION, SOLUTION INTRAVENOUS at 17:09

## 2020-11-26 RX ADMIN — CLOPIDOGREL BISULFATE 300 MG: 75 TABLET ORAL at 17:05

## 2020-11-26 RX ADMIN — HEPARIN SODIUM 12 UNITS/KG/HR: 10000 INJECTION, SOLUTION INTRAVENOUS at 17:22

## 2020-11-26 RX ADMIN — SODIUM CHLORIDE 75 ML/HR: 9 INJECTION, SOLUTION INTRAVENOUS at 21:11

## 2020-11-26 RX ADMIN — IOPAMIDOL 110 ML: 755 INJECTION, SOLUTION INTRAVENOUS at 16:02

## 2020-11-27 ENCOUNTER — APPOINTMENT (OUTPATIENT)
Dept: CARDIOLOGY | Facility: HOSPITAL | Age: 55
End: 2020-11-27

## 2020-11-27 LAB
ANION GAP SERPL CALCULATED.3IONS-SCNC: 13 MMOL/L (ref 5–15)
BH CV ECHO MEAS - AO MAX PG (FULL): 1.1 MMHG
BH CV ECHO MEAS - AO MAX PG: 6.1 MMHG
BH CV ECHO MEAS - AO MEAN PG (FULL): 0.83 MMHG
BH CV ECHO MEAS - AO MEAN PG: 3.4 MMHG
BH CV ECHO MEAS - AO ROOT AREA (BSA CORRECTED): 1.7
BH CV ECHO MEAS - AO ROOT AREA: 7.2 CM^2
BH CV ECHO MEAS - AO ROOT DIAM: 3 CM
BH CV ECHO MEAS - AO V2 MAX: 123.3 CM/SEC
BH CV ECHO MEAS - AO V2 MEAN: 86.5 CM/SEC
BH CV ECHO MEAS - AO V2 VTI: 27.1 CM
BH CV ECHO MEAS - AVA(I,A): 2.9 CM^2
BH CV ECHO MEAS - AVA(I,D): 2.9 CM^2
BH CV ECHO MEAS - AVA(V,A): 2.7 CM^2
BH CV ECHO MEAS - AVA(V,D): 2.7 CM^2
BH CV ECHO MEAS - BSA(HAYCOCK): 1.9 M^2
BH CV ECHO MEAS - BSA: 1.8 M^2
BH CV ECHO MEAS - BZI_BMI: 29.4 KILOGRAMS/M^2
BH CV ECHO MEAS - BZI_METRIC_HEIGHT: 162.6 CM
BH CV ECHO MEAS - BZI_METRIC_WEIGHT: 77.6 KG
BH CV ECHO MEAS - EDV(CUBED): 101.1 ML
BH CV ECHO MEAS - EDV(MOD-SP2): 125 ML
BH CV ECHO MEAS - EDV(MOD-SP4): 141 ML
BH CV ECHO MEAS - EDV(TEICH): 100.3 ML
BH CV ECHO MEAS - EF(CUBED): 74.1 %
BH CV ECHO MEAS - EF(MOD-BP): 62 %
BH CV ECHO MEAS - EF(MOD-SP2): 58.4 %
BH CV ECHO MEAS - EF(MOD-SP4): 64.5 %
BH CV ECHO MEAS - EF(TEICH): 65.9 %
BH CV ECHO MEAS - ESV(CUBED): 26.2 ML
BH CV ECHO MEAS - ESV(MOD-SP2): 52 ML
BH CV ECHO MEAS - ESV(MOD-SP4): 50 ML
BH CV ECHO MEAS - ESV(TEICH): 34.2 ML
BH CV ECHO MEAS - FS: 36.2 %
BH CV ECHO MEAS - IVS/LVPW: 0.84
BH CV ECHO MEAS - IVSD: 0.92 CM
BH CV ECHO MEAS - LA DIMENSION: 3.7 CM
BH CV ECHO MEAS - LA/AO: 1.2
BH CV ECHO MEAS - LAD MAJOR: 4.3 CM
BH CV ECHO MEAS - LAT PEAK E' VEL: 8.5 CM/SEC
BH CV ECHO MEAS - LATERAL E/E' RATIO: 8.8
BH CV ECHO MEAS - LV DIASTOLIC VOL/BSA (35-75): 77 ML/M^2
BH CV ECHO MEAS - LV MASS(C)D: 163.1 GRAMS
BH CV ECHO MEAS - LV MASS(C)DI: 89.1 GRAMS/M^2
BH CV ECHO MEAS - LV MAX PG: 5 MMHG
BH CV ECHO MEAS - LV MEAN PG: 2.6 MMHG
BH CV ECHO MEAS - LV SYSTOLIC VOL/BSA (12-30): 27.3 ML/M^2
BH CV ECHO MEAS - LV V1 MAX: 112 CM/SEC
BH CV ECHO MEAS - LV V1 MEAN: 74 CM/SEC
BH CV ECHO MEAS - LV V1 VTI: 26.3 CM
BH CV ECHO MEAS - LVIDD: 4.7 CM
BH CV ECHO MEAS - LVIDS: 3 CM
BH CV ECHO MEAS - LVLD AP2: 8 CM
BH CV ECHO MEAS - LVLD AP4: 8.3 CM
BH CV ECHO MEAS - LVLS AP2: 6.4 CM
BH CV ECHO MEAS - LVLS AP4: 6.5 CM
BH CV ECHO MEAS - LVOT AREA (M): 3.1 CM^2
BH CV ECHO MEAS - LVOT AREA: 3 CM^2
BH CV ECHO MEAS - LVOT DIAM: 2 CM
BH CV ECHO MEAS - LVPWD: 1.1 CM
BH CV ECHO MEAS - MED PEAK E' VEL: 6.3 CM/SEC
BH CV ECHO MEAS - MEDIAL E/E' RATIO: 11.8
BH CV ECHO MEAS - MV A MAX VEL: 81.9 CM/SEC
BH CV ECHO MEAS - MV DEC TIME: 0.23 SEC
BH CV ECHO MEAS - MV E MAX VEL: 75.8 CM/SEC
BH CV ECHO MEAS - MV E/A: 0.92
BH CV ECHO MEAS - MV MAX PG: 4 MMHG
BH CV ECHO MEAS - MV MEAN PG: 1.9 MMHG
BH CV ECHO MEAS - MV V2 MAX: 100.2 CM/SEC
BH CV ECHO MEAS - MV V2 MEAN: 63.7 CM/SEC
BH CV ECHO MEAS - MV V2 VTI: 26.9 CM
BH CV ECHO MEAS - MVA(VTI): 3 CM^2
BH CV ECHO MEAS - PA ACC SLOPE: 515 CM/SEC^2
BH CV ECHO MEAS - PA ACC TIME: 0.16 SEC
BH CV ECHO MEAS - PA MAX PG: 3.7 MMHG
BH CV ECHO MEAS - PA PR(ACCEL): 6.1 MMHG
BH CV ECHO MEAS - PA V2 MAX: 95.6 CM/SEC
BH CV ECHO MEAS - RAP SYSTOLE: 3 MMHG
BH CV ECHO MEAS - RVSP: 17 MMHG
BH CV ECHO MEAS - SI(AO): 106.6 ML/M^2
BH CV ECHO MEAS - SI(CUBED): 40.9 ML/M^2
BH CV ECHO MEAS - SI(LVOT): 43.5 ML/M^2
BH CV ECHO MEAS - SI(MOD-SP2): 39.9 ML/M^2
BH CV ECHO MEAS - SI(MOD-SP4): 49.7 ML/M^2
BH CV ECHO MEAS - SI(TEICH): 36.1 ML/M^2
BH CV ECHO MEAS - SV(AO): 195.1 ML
BH CV ECHO MEAS - SV(CUBED): 74.9 ML
BH CV ECHO MEAS - SV(LVOT): 79.6 ML
BH CV ECHO MEAS - SV(MOD-SP2): 73 ML
BH CV ECHO MEAS - SV(MOD-SP4): 91 ML
BH CV ECHO MEAS - SV(TEICH): 66.1 ML
BH CV ECHO MEAS - TAPSE (>1.6): 2.3 CM
BH CV ECHO MEAS - TR MAX PG: 14 MMHG
BH CV ECHO MEAS - TR MAX VEL: 184.2 CM/SEC
BH CV ECHO MEASUREMENTS AVERAGE E/E' RATIO: 10.24
BH CV XLRA - RV BASE: 3.4 CM
BH CV XLRA - RV LENGTH: 6.3 CM
BH CV XLRA - RV MID: 2.2 CM
BH CV XLRA - TDI S': 15.5 CM/SEC
BUN SERPL-MCNC: 7 MG/DL (ref 6–20)
BUN/CREAT SERPL: 10.8 (ref 7–25)
CALCIUM SPEC-SCNC: 8.9 MG/DL (ref 8.6–10.5)
CHLORIDE SERPL-SCNC: 99 MMOL/L (ref 98–107)
CHOLEST SERPL-MCNC: 122 MG/DL (ref 0–200)
CO2 SERPL-SCNC: 30 MMOL/L (ref 22–29)
CREAT SERPL-MCNC: 0.65 MG/DL (ref 0.57–1)
D DIMER PPP FEU-MCNC: <0.27 MCGFEU/ML (ref 0–0.56)
GFR SERPL CREATININE-BSD FRML MDRD: 95 ML/MIN/1.73
GLUCOSE BLDC GLUCOMTR-MCNC: 169 MG/DL (ref 70–130)
GLUCOSE BLDC GLUCOMTR-MCNC: 189 MG/DL (ref 70–130)
GLUCOSE BLDC GLUCOMTR-MCNC: 266 MG/DL (ref 70–130)
GLUCOSE SERPL-MCNC: 83 MG/DL (ref 65–99)
HBA1C MFR BLD: 8.4 % (ref 4.8–5.6)
HCYS SERPL-MCNC: 10.2 UMOL/L (ref 0–15)
HDLC SERPL-MCNC: 29 MG/DL (ref 40–60)
LDLC SERPL CALC-MCNC: 69 MG/DL (ref 0–100)
LDLC/HDLC SERPL: 2.28 {RATIO}
LEFT ATRIUM VOLUME INDEX: 30.1 ML/M^2
LEFT ATRIUM VOLUME: 55 ML
LV EF 2D ECHO EST: 65 %
MAXIMAL PREDICTED HEART RATE: 167 BPM
POTASSIUM SERPL-SCNC: 2.5 MMOL/L (ref 3.5–5.2)
POTASSIUM SERPL-SCNC: 3 MMOL/L (ref 3.5–5.2)
POTASSIUM SERPL-SCNC: 3.3 MMOL/L (ref 3.5–5.2)
SODIUM SERPL-SCNC: 142 MMOL/L (ref 136–145)
STRESS TARGET HR: 142 BPM
TRIGL SERPL-MCNC: 134 MG/DL (ref 0–150)
UFH PPP CHRO-ACNC: 0.28 IU/ML (ref 0.3–0.7)
UFH PPP CHRO-ACNC: 0.37 IU/ML (ref 0.3–0.7)
VLDLC SERPL-MCNC: 24 MG/DL (ref 5–40)

## 2020-11-27 PROCEDURE — 97165 OT EVAL LOW COMPLEX 30 MIN: CPT

## 2020-11-27 PROCEDURE — 80061 LIPID PANEL: CPT | Performed by: STUDENT IN AN ORGANIZED HEALTH CARE EDUCATION/TRAINING PROGRAM

## 2020-11-27 PROCEDURE — 81241 F5 GENE: CPT | Performed by: NURSE PRACTITIONER

## 2020-11-27 PROCEDURE — 85379 FIBRIN DEGRADATION QUANT: CPT | Performed by: NURSE PRACTITIONER

## 2020-11-27 PROCEDURE — 86148 ANTI-PHOSPHOLIPID ANTIBODY: CPT | Performed by: NURSE PRACTITIONER

## 2020-11-27 PROCEDURE — 25010000003 POTASSIUM CHLORIDE 10 MEQ/100ML SOLUTION: Performed by: NURSE PRACTITIONER

## 2020-11-27 PROCEDURE — 80048 BASIC METABOLIC PNL TOTAL CA: CPT

## 2020-11-27 PROCEDURE — 93975 VASCULAR STUDY: CPT

## 2020-11-27 PROCEDURE — 63710000001 INSULIN DETEMIR PER 5 UNITS: Performed by: NURSE PRACTITIONER

## 2020-11-27 PROCEDURE — 82088 ASSAY OF ALDOSTERONE: CPT | Performed by: INTERNAL MEDICINE

## 2020-11-27 PROCEDURE — 85670 THROMBIN TIME PLASMA: CPT | Performed by: NURSE PRACTITIONER

## 2020-11-27 PROCEDURE — 85732 THROMBOPLASTIN TIME PARTIAL: CPT | Performed by: NURSE PRACTITIONER

## 2020-11-27 PROCEDURE — 85520 HEPARIN ASSAY: CPT

## 2020-11-27 PROCEDURE — 97116 GAIT TRAINING THERAPY: CPT

## 2020-11-27 PROCEDURE — 81240 F2 GENE: CPT | Performed by: NURSE PRACTITIONER

## 2020-11-27 PROCEDURE — 63710000001 INSULIN LISPRO (HUMAN) PER 5 UNITS: Performed by: NURSE PRACTITIONER

## 2020-11-27 PROCEDURE — 82962 GLUCOSE BLOOD TEST: CPT

## 2020-11-27 PROCEDURE — 85613 RUSSELL VIPER VENOM DILUTED: CPT | Performed by: NURSE PRACTITIONER

## 2020-11-27 PROCEDURE — 93306 TTE W/DOPPLER COMPLETE: CPT

## 2020-11-27 PROCEDURE — 86147 CARDIOLIPIN ANTIBODY EA IG: CPT | Performed by: NURSE PRACTITIONER

## 2020-11-27 PROCEDURE — 83090 ASSAY OF HOMOCYSTEINE: CPT | Performed by: NURSE PRACTITIONER

## 2020-11-27 PROCEDURE — 85300 ANTITHROMBIN III ACTIVITY: CPT | Performed by: NURSE PRACTITIONER

## 2020-11-27 PROCEDURE — 25010000002 HEPARIN (PORCINE) 25000-0.45 UT/250ML-% SOLUTION

## 2020-11-27 PROCEDURE — 99232 SBSQ HOSP IP/OBS MODERATE 35: CPT | Performed by: FAMILY MEDICINE

## 2020-11-27 PROCEDURE — 84132 ASSAY OF SERUM POTASSIUM: CPT | Performed by: FAMILY MEDICINE

## 2020-11-27 PROCEDURE — 97162 PT EVAL MOD COMPLEX 30 MIN: CPT

## 2020-11-27 PROCEDURE — 85303 CLOT INHIBIT PROT C ACTIVITY: CPT | Performed by: NURSE PRACTITIONER

## 2020-11-27 PROCEDURE — 83036 HEMOGLOBIN GLYCOSYLATED A1C: CPT | Performed by: STUDENT IN AN ORGANIZED HEALTH CARE EDUCATION/TRAINING PROGRAM

## 2020-11-27 PROCEDURE — 93306 TTE W/DOPPLER COMPLETE: CPT | Performed by: INTERNAL MEDICINE

## 2020-11-27 PROCEDURE — 86146 BETA-2 GLYCOPROTEIN ANTIBODY: CPT | Performed by: NURSE PRACTITIONER

## 2020-11-27 PROCEDURE — 85306 CLOT INHIBIT PROT S FREE: CPT | Performed by: NURSE PRACTITIONER

## 2020-11-27 PROCEDURE — 93886 INTRACRANIAL COMPLETE STUDY: CPT

## 2020-11-27 PROCEDURE — 84244 ASSAY OF RENIN: CPT | Performed by: INTERNAL MEDICINE

## 2020-11-27 PROCEDURE — 93886 INTRACRANIAL COMPLETE STUDY: CPT | Performed by: STUDENT IN AN ORGANIZED HEALTH CARE EDUCATION/TRAINING PROGRAM

## 2020-11-27 PROCEDURE — 86038 ANTINUCLEAR ANTIBODIES: CPT | Performed by: NURSE PRACTITIONER

## 2020-11-27 PROCEDURE — 85705 THROMBOPLASTIN INHIBITION: CPT | Performed by: NURSE PRACTITIONER

## 2020-11-27 RX ORDER — LOSARTAN POTASSIUM 50 MG/1
100 TABLET ORAL DAILY
Status: DISCONTINUED | OUTPATIENT
Start: 2020-11-27 | End: 2020-12-03 | Stop reason: HOSPADM

## 2020-11-27 RX ORDER — POLYETHYLENE GLYCOL 3350 17 G/17G
17 POWDER, FOR SOLUTION ORAL DAILY
Status: DISCONTINUED | OUTPATIENT
Start: 2020-11-27 | End: 2020-12-01

## 2020-11-27 RX ORDER — HEPARIN SODIUM 1000 [USP'U]/ML
4000 INJECTION, SOLUTION INTRAVENOUS; SUBCUTANEOUS ONCE
Status: DISCONTINUED | OUTPATIENT
Start: 2020-11-27 | End: 2020-11-27

## 2020-11-27 RX ORDER — BISACODYL 10 MG
10 SUPPOSITORY, RECTAL RECTAL DAILY PRN
Status: DISCONTINUED | OUTPATIENT
Start: 2020-11-27 | End: 2020-11-30 | Stop reason: SDUPTHER

## 2020-11-27 RX ORDER — DOCUSATE SODIUM 100 MG/1
100 CAPSULE, LIQUID FILLED ORAL 2 TIMES DAILY
Status: DISCONTINUED | OUTPATIENT
Start: 2020-11-27 | End: 2020-12-03 | Stop reason: HOSPADM

## 2020-11-27 RX ORDER — ASPIRIN 81 MG/1
81 TABLET, CHEWABLE ORAL DAILY
Status: DISCONTINUED | OUTPATIENT
Start: 2020-11-28 | End: 2020-12-03 | Stop reason: HOSPADM

## 2020-11-27 RX ADMIN — FAMOTIDINE 20 MG: 20 TABLET, FILM COATED ORAL at 17:09

## 2020-11-27 RX ADMIN — POTASSIUM CHLORIDE 10 MEQ: 7.46 INJECTION, SOLUTION INTRAVENOUS at 12:07

## 2020-11-27 RX ADMIN — INSULIN LISPRO 6 UNITS: 100 INJECTION, SOLUTION INTRAVENOUS; SUBCUTANEOUS at 12:16

## 2020-11-27 RX ADMIN — DILTIAZEM HYDROCHLORIDE 300 MG: 180 CAPSULE, COATED, EXTENDED RELEASE ORAL at 12:07

## 2020-11-27 RX ADMIN — MAGNESIUM OXIDE TAB 400 MG (241.3 MG ELEMENTAL MG) 400 MG: 400 (241.3 MG) TAB at 12:07

## 2020-11-27 RX ADMIN — POLYETHYLENE GLYCOL 3350 17 G: 17 POWDER, FOR SOLUTION ORAL at 09:51

## 2020-11-27 RX ADMIN — POTASSIUM CHLORIDE 10 MEQ: 7.46 INJECTION, SOLUTION INTRAVENOUS at 08:40

## 2020-11-27 RX ADMIN — INSULIN LISPRO 2 UNITS: 100 INJECTION, SOLUTION INTRAVENOUS; SUBCUTANEOUS at 17:09

## 2020-11-27 RX ADMIN — POTASSIUM CHLORIDE 40 MEQ: 10 CAPSULE, COATED, EXTENDED RELEASE ORAL at 13:22

## 2020-11-27 RX ADMIN — MAGNESIUM OXIDE TAB 400 MG (241.3 MG ELEMENTAL MG) 400 MG: 400 (241.3 MG) TAB at 20:20

## 2020-11-27 RX ADMIN — FAMOTIDINE 20 MG: 20 TABLET, FILM COATED ORAL at 09:50

## 2020-11-27 RX ADMIN — LOSARTAN POTASSIUM 100 MG: 50 TABLET, FILM COATED ORAL at 12:07

## 2020-11-27 RX ADMIN — VENLAFAXINE HYDROCHLORIDE 150 MG: 75 CAPSULE, EXTENDED RELEASE ORAL at 09:53

## 2020-11-27 RX ADMIN — ATORVASTATIN CALCIUM 80 MG: 40 TABLET, FILM COATED ORAL at 20:20

## 2020-11-27 RX ADMIN — ACETAMINOPHEN 650 MG: 325 TABLET, FILM COATED ORAL at 10:49

## 2020-11-27 RX ADMIN — POTASSIUM CHLORIDE 40 MEQ: 10 CAPSULE, COATED, EXTENDED RELEASE ORAL at 17:26

## 2020-11-27 RX ADMIN — DOCUSATE SODIUM 100 MG: 100 CAPSULE, LIQUID FILLED ORAL at 09:51

## 2020-11-27 RX ADMIN — DOCUSATE SODIUM 100 MG: 100 CAPSULE, LIQUID FILLED ORAL at 20:20

## 2020-11-27 RX ADMIN — SODIUM CHLORIDE, PRESERVATIVE FREE 10 ML: 5 INJECTION INTRAVENOUS at 08:41

## 2020-11-27 RX ADMIN — SODIUM CHLORIDE, PRESERVATIVE FREE 10 ML: 5 INJECTION INTRAVENOUS at 20:21

## 2020-11-27 RX ADMIN — CLOPIDOGREL BISULFATE 75 MG: 75 TABLET ORAL at 09:51

## 2020-11-27 RX ADMIN — POTASSIUM CHLORIDE 10 MEQ: 7.46 INJECTION, SOLUTION INTRAVENOUS at 09:51

## 2020-11-27 RX ADMIN — POTASSIUM CHLORIDE 40 MEQ: 10 CAPSULE, COATED, EXTENDED RELEASE ORAL at 23:14

## 2020-11-27 RX ADMIN — HEPARIN SODIUM 16 UNITS/KG/HR: 10000 INJECTION, SOLUTION INTRAVENOUS at 12:16

## 2020-11-27 RX ADMIN — INSULIN DETEMIR 5 UNITS: 100 INJECTION, SOLUTION SUBCUTANEOUS at 20:20

## 2020-11-28 LAB
ANION GAP SERPL CALCULATED.3IONS-SCNC: 9 MMOL/L (ref 5–15)
BUN SERPL-MCNC: 8 MG/DL (ref 6–20)
BUN/CREAT SERPL: 13.3 (ref 7–25)
CALCIUM SPEC-SCNC: 9 MG/DL (ref 8.6–10.5)
CHLORIDE SERPL-SCNC: 104 MMOL/L (ref 98–107)
CO2 SERPL-SCNC: 28 MMOL/L (ref 22–29)
CREAT SERPL-MCNC: 0.6 MG/DL (ref 0.57–1)
DEPRECATED RDW RBC AUTO: 37.6 FL (ref 37–54)
ERYTHROCYTE [DISTWIDTH] IN BLOOD BY AUTOMATED COUNT: 11.6 % (ref 12.3–15.4)
GFR SERPL CREATININE-BSD FRML MDRD: 105 ML/MIN/1.73
GLUCOSE BLDC GLUCOMTR-MCNC: 172 MG/DL (ref 70–130)
GLUCOSE BLDC GLUCOMTR-MCNC: 175 MG/DL (ref 70–130)
GLUCOSE BLDC GLUCOMTR-MCNC: 181 MG/DL (ref 70–130)
GLUCOSE BLDC GLUCOMTR-MCNC: 284 MG/DL (ref 70–130)
GLUCOSE SERPL-MCNC: 177 MG/DL (ref 65–99)
HCT VFR BLD AUTO: 41.2 % (ref 34–46.6)
HGB BLD-MCNC: 14.1 G/DL (ref 12–15.9)
MAGNESIUM SERPL-MCNC: 1.8 MG/DL (ref 1.6–2.6)
MCH RBC QN AUTO: 30.9 PG (ref 26.6–33)
MCHC RBC AUTO-ENTMCNC: 34.2 G/DL (ref 31.5–35.7)
MCV RBC AUTO: 90.2 FL (ref 79–97)
PLATELET # BLD AUTO: 304 10*3/MM3 (ref 140–450)
PMV BLD AUTO: 10.1 FL (ref 6–12)
POTASSIUM SERPL-SCNC: 3.9 MMOL/L (ref 3.5–5.2)
RBC # BLD AUTO: 4.57 10*6/MM3 (ref 3.77–5.28)
SODIUM SERPL-SCNC: 141 MMOL/L (ref 136–145)
WBC # BLD AUTO: 11.68 10*3/MM3 (ref 3.4–10.8)

## 2020-11-28 PROCEDURE — 82962 GLUCOSE BLOOD TEST: CPT

## 2020-11-28 PROCEDURE — 85027 COMPLETE CBC AUTOMATED: CPT | Performed by: FAMILY MEDICINE

## 2020-11-28 PROCEDURE — 80048 BASIC METABOLIC PNL TOTAL CA: CPT | Performed by: FAMILY MEDICINE

## 2020-11-28 PROCEDURE — 83735 ASSAY OF MAGNESIUM: CPT | Performed by: FAMILY MEDICINE

## 2020-11-28 PROCEDURE — 63710000001 INSULIN DETEMIR PER 5 UNITS: Performed by: NURSE PRACTITIONER

## 2020-11-28 PROCEDURE — 92523 SPEECH SOUND LANG COMPREHEN: CPT

## 2020-11-28 PROCEDURE — 99233 SBSQ HOSP IP/OBS HIGH 50: CPT | Performed by: NURSE PRACTITIONER

## 2020-11-28 PROCEDURE — 63710000001 INSULIN LISPRO (HUMAN) PER 5 UNITS: Performed by: NURSE PRACTITIONER

## 2020-11-28 PROCEDURE — 99232 SBSQ HOSP IP/OBS MODERATE 35: CPT | Performed by: FAMILY MEDICINE

## 2020-11-28 RX ORDER — VENLAFAXINE HYDROCHLORIDE 75 MG/1
225 CAPSULE, EXTENDED RELEASE ORAL DAILY
Status: DISCONTINUED | OUTPATIENT
Start: 2020-11-29 | End: 2020-12-03 | Stop reason: HOSPADM

## 2020-11-28 RX ORDER — CARVEDILOL 3.12 MG/1
3.12 TABLET ORAL 2 TIMES DAILY WITH MEALS
Status: DISCONTINUED | OUTPATIENT
Start: 2020-11-28 | End: 2020-11-29

## 2020-11-28 RX ADMIN — INSULIN LISPRO 2 UNITS: 100 INJECTION, SOLUTION INTRAVENOUS; SUBCUTANEOUS at 14:06

## 2020-11-28 RX ADMIN — INSULIN LISPRO 2 UNITS: 100 INJECTION, SOLUTION INTRAVENOUS; SUBCUTANEOUS at 18:30

## 2020-11-28 RX ADMIN — ATORVASTATIN CALCIUM 80 MG: 40 TABLET, FILM COATED ORAL at 21:59

## 2020-11-28 RX ADMIN — DILTIAZEM HYDROCHLORIDE 300 MG: 180 CAPSULE, COATED, EXTENDED RELEASE ORAL at 09:55

## 2020-11-28 RX ADMIN — VENLAFAXINE HYDROCHLORIDE 150 MG: 75 CAPSULE, EXTENDED RELEASE ORAL at 09:56

## 2020-11-28 RX ADMIN — CLOPIDOGREL BISULFATE 75 MG: 75 TABLET ORAL at 09:56

## 2020-11-28 RX ADMIN — POTASSIUM CHLORIDE 40 MEQ: 10 CAPSULE, COATED, EXTENDED RELEASE ORAL at 03:34

## 2020-11-28 RX ADMIN — MAGNESIUM OXIDE TAB 400 MG (241.3 MG ELEMENTAL MG) 400 MG: 400 (241.3 MG) TAB at 21:59

## 2020-11-28 RX ADMIN — FAMOTIDINE 20 MG: 20 TABLET, FILM COATED ORAL at 09:55

## 2020-11-28 RX ADMIN — ASPIRIN 81 MG: 81 TABLET, CHEWABLE ORAL at 09:55

## 2020-11-28 RX ADMIN — INSULIN LISPRO 2 UNITS: 100 INJECTION, SOLUTION INTRAVENOUS; SUBCUTANEOUS at 10:15

## 2020-11-28 RX ADMIN — CARVEDILOL 3.12 MG: 3.12 TABLET, FILM COATED ORAL at 18:30

## 2020-11-28 RX ADMIN — LOSARTAN POTASSIUM 100 MG: 50 TABLET, FILM COATED ORAL at 09:56

## 2020-11-28 RX ADMIN — POLYETHYLENE GLYCOL 3350 17 G: 17 POWDER, FOR SOLUTION ORAL at 09:56

## 2020-11-28 RX ADMIN — DOCUSATE SODIUM 100 MG: 100 CAPSULE, LIQUID FILLED ORAL at 21:59

## 2020-11-28 RX ADMIN — SODIUM CHLORIDE, PRESERVATIVE FREE 10 ML: 5 INJECTION INTRAVENOUS at 22:00

## 2020-11-28 RX ADMIN — FAMOTIDINE 20 MG: 20 TABLET, FILM COATED ORAL at 18:30

## 2020-11-28 RX ADMIN — DOCUSATE SODIUM 100 MG: 100 CAPSULE, LIQUID FILLED ORAL at 09:55

## 2020-11-28 RX ADMIN — MAGNESIUM OXIDE TAB 400 MG (241.3 MG ELEMENTAL MG) 400 MG: 400 (241.3 MG) TAB at 09:55

## 2020-11-28 RX ADMIN — INSULIN DETEMIR 5 UNITS: 100 INJECTION, SOLUTION SUBCUTANEOUS at 21:00

## 2020-11-29 LAB
GLUCOSE BLDC GLUCOMTR-MCNC: 162 MG/DL (ref 70–130)
GLUCOSE BLDC GLUCOMTR-MCNC: 163 MG/DL (ref 70–130)
GLUCOSE BLDC GLUCOMTR-MCNC: 175 MG/DL (ref 70–130)
GLUCOSE BLDC GLUCOMTR-MCNC: 192 MG/DL (ref 70–130)

## 2020-11-29 PROCEDURE — 82962 GLUCOSE BLOOD TEST: CPT

## 2020-11-29 PROCEDURE — 63710000001 INSULIN LISPRO (HUMAN) PER 5 UNITS: Performed by: FAMILY MEDICINE

## 2020-11-29 PROCEDURE — 63710000001 INSULIN LISPRO (HUMAN) PER 5 UNITS: Performed by: NURSE PRACTITIONER

## 2020-11-29 PROCEDURE — 99232 SBSQ HOSP IP/OBS MODERATE 35: CPT | Performed by: FAMILY MEDICINE

## 2020-11-29 PROCEDURE — 63710000001 INSULIN DETEMIR PER 5 UNITS: Performed by: FAMILY MEDICINE

## 2020-11-29 RX ORDER — CHOLECALCIFEROL (VITAMIN D3) 125 MCG
5 CAPSULE ORAL NIGHTLY PRN
Status: DISCONTINUED | OUTPATIENT
Start: 2020-11-29 | End: 2020-12-03 | Stop reason: HOSPADM

## 2020-11-29 RX ORDER — BISACODYL 10 MG
10 SUPPOSITORY, RECTAL RECTAL DAILY PRN
Status: DISCONTINUED | OUTPATIENT
Start: 2020-11-29 | End: 2020-12-03 | Stop reason: HOSPADM

## 2020-11-29 RX ORDER — HYDROXYZINE HYDROCHLORIDE 25 MG/1
25 TABLET, FILM COATED ORAL NIGHTLY PRN
Status: DISCONTINUED | OUTPATIENT
Start: 2020-11-29 | End: 2020-12-03 | Stop reason: HOSPADM

## 2020-11-29 RX ORDER — HYDROXYZINE HYDROCHLORIDE 25 MG/1
25 TABLET, FILM COATED ORAL NIGHTLY PRN
Status: DISCONTINUED | OUTPATIENT
Start: 2020-11-29 | End: 2020-11-29

## 2020-11-29 RX ORDER — HYDRALAZINE HYDROCHLORIDE 20 MG/ML
20 INJECTION INTRAMUSCULAR; INTRAVENOUS ONCE
Status: COMPLETED | OUTPATIENT
Start: 2020-11-30 | End: 2020-11-29

## 2020-11-29 RX ORDER — BISACODYL 5 MG/1
10 TABLET, DELAYED RELEASE ORAL DAILY
Status: DISCONTINUED | OUTPATIENT
Start: 2020-11-29 | End: 2020-12-03 | Stop reason: HOSPADM

## 2020-11-29 RX ORDER — MINERAL OIL 100 G/100G
1 OIL RECTAL ONCE AS NEEDED
Status: COMPLETED | OUTPATIENT
Start: 2020-11-29 | End: 2020-11-30

## 2020-11-29 RX ORDER — TERAZOSIN 2 MG/1
2 CAPSULE ORAL NIGHTLY
Status: DISCONTINUED | OUTPATIENT
Start: 2020-11-29 | End: 2020-11-30

## 2020-11-29 RX ORDER — CARVEDILOL 3.12 MG/1
6.25 TABLET ORAL 2 TIMES DAILY WITH MEALS
Status: DISCONTINUED | OUTPATIENT
Start: 2020-11-29 | End: 2020-12-03 | Stop reason: HOSPADM

## 2020-11-29 RX ADMIN — MELATONIN TAB 5 MG 5 MG: 5 TAB at 20:40

## 2020-11-29 RX ADMIN — SODIUM CHLORIDE, PRESERVATIVE FREE 10 ML: 5 INJECTION INTRAVENOUS at 20:41

## 2020-11-29 RX ADMIN — VENLAFAXINE HYDROCHLORIDE 225 MG: 75 CAPSULE, EXTENDED RELEASE ORAL at 08:47

## 2020-11-29 RX ADMIN — INSULIN LISPRO 3 UNITS: 100 INJECTION, SOLUTION INTRAVENOUS; SUBCUTANEOUS at 17:09

## 2020-11-29 RX ADMIN — LOSARTAN POTASSIUM 100 MG: 50 TABLET, FILM COATED ORAL at 08:44

## 2020-11-29 RX ADMIN — DILTIAZEM HYDROCHLORIDE 300 MG: 180 CAPSULE, COATED, EXTENDED RELEASE ORAL at 08:45

## 2020-11-29 RX ADMIN — DOCUSATE SODIUM 100 MG: 100 CAPSULE, LIQUID FILLED ORAL at 20:42

## 2020-11-29 RX ADMIN — DOCUSATE SODIUM 100 MG: 100 CAPSULE, LIQUID FILLED ORAL at 08:45

## 2020-11-29 RX ADMIN — FAMOTIDINE 20 MG: 20 TABLET, FILM COATED ORAL at 17:09

## 2020-11-29 RX ADMIN — ASPIRIN 81 MG: 81 TABLET, CHEWABLE ORAL at 08:45

## 2020-11-29 RX ADMIN — INSULIN LISPRO 2 UNITS: 100 INJECTION, SOLUTION INTRAVENOUS; SUBCUTANEOUS at 11:50

## 2020-11-29 RX ADMIN — SODIUM CHLORIDE, PRESERVATIVE FREE 10 ML: 5 INJECTION INTRAVENOUS at 08:47

## 2020-11-29 RX ADMIN — ATORVASTATIN CALCIUM 80 MG: 40 TABLET, FILM COATED ORAL at 20:41

## 2020-11-29 RX ADMIN — CLOPIDOGREL BISULFATE 75 MG: 75 TABLET ORAL at 08:45

## 2020-11-29 RX ADMIN — MAGNESIUM OXIDE TAB 400 MG (241.3 MG ELEMENTAL MG) 400 MG: 400 (241.3 MG) TAB at 20:42

## 2020-11-29 RX ADMIN — HYDROXYZINE HYDROCHLORIDE 25 MG: 25 TABLET, FILM COATED ORAL at 20:41

## 2020-11-29 RX ADMIN — INSULIN LISPRO 2 UNITS: 100 INJECTION, SOLUTION INTRAVENOUS; SUBCUTANEOUS at 17:08

## 2020-11-29 RX ADMIN — FAMOTIDINE 20 MG: 20 TABLET, FILM COATED ORAL at 08:45

## 2020-11-29 RX ADMIN — TERAZOSIN HYDROCHLORIDE 2 MG: 2 CAPSULE ORAL at 20:42

## 2020-11-29 RX ADMIN — INSULIN DETEMIR 10 UNITS: 100 INJECTION, SOLUTION SUBCUTANEOUS at 20:40

## 2020-11-29 RX ADMIN — CARVEDILOL 3.12 MG: 3.12 TABLET, FILM COATED ORAL at 08:45

## 2020-11-29 RX ADMIN — INSULIN LISPRO 2 UNITS: 100 INJECTION, SOLUTION INTRAVENOUS; SUBCUTANEOUS at 08:45

## 2020-11-29 RX ADMIN — HYDRALAZINE HYDROCHLORIDE 20 MG: 20 INJECTION, SOLUTION INTRAMUSCULAR; INTRAVENOUS at 23:50

## 2020-11-29 RX ADMIN — BISACODYL 10 MG: 5 TABLET, COATED ORAL at 08:45

## 2020-11-29 RX ADMIN — MAGNESIUM OXIDE TAB 400 MG (241.3 MG ELEMENTAL MG) 400 MG: 400 (241.3 MG) TAB at 08:44

## 2020-11-29 RX ADMIN — INSULIN LISPRO 3 UNITS: 100 INJECTION, SOLUTION INTRAVENOUS; SUBCUTANEOUS at 11:49

## 2020-11-29 RX ADMIN — CARVEDILOL 6.25 MG: 3.12 TABLET, FILM COATED ORAL at 17:08

## 2020-11-29 RX ADMIN — POLYETHYLENE GLYCOL 3350 17 G: 17 POWDER, FOR SOLUTION ORAL at 08:45

## 2020-11-30 LAB
ANA SER QL: NEGATIVE
AT III ACT/NOR PPP CHRO: 114 % (ref 75–135)
CARDIOLIPIN IGG SER IA-ACNC: <9 GPL U/ML (ref 0–14)
CARDIOLIPIN IGM SER IA-ACNC: 16 MPL U/ML (ref 0–12)
F5 GENE MUT ANL BLD/T: NORMAL
FACTOR II, DNA ANALYSIS: NORMAL
GLUCOSE BLDC GLUCOMTR-MCNC: 140 MG/DL (ref 70–130)
GLUCOSE BLDC GLUCOMTR-MCNC: 154 MG/DL (ref 70–130)
GLUCOSE BLDC GLUCOMTR-MCNC: 161 MG/DL (ref 70–130)
GLUCOSE BLDC GLUCOMTR-MCNC: 168 MG/DL (ref 70–130)
GLUCOSE BLDC GLUCOMTR-MCNC: 199 MG/DL (ref 70–130)
PROT S ACT/NOR PPP: 111 % (ref 63–140)

## 2020-11-30 PROCEDURE — 25010000002 HYDRALAZINE PER 20 MG: Performed by: PHYSICIAN ASSISTANT

## 2020-11-30 PROCEDURE — 63710000001 INSULIN LISPRO (HUMAN) PER 5 UNITS: Performed by: NURSE PRACTITIONER

## 2020-11-30 PROCEDURE — 97110 THERAPEUTIC EXERCISES: CPT

## 2020-11-30 PROCEDURE — 63710000001 INSULIN DETEMIR PER 5 UNITS: Performed by: FAMILY MEDICINE

## 2020-11-30 PROCEDURE — 82962 GLUCOSE BLOOD TEST: CPT

## 2020-11-30 PROCEDURE — 99232 SBSQ HOSP IP/OBS MODERATE 35: CPT | Performed by: FAMILY MEDICINE

## 2020-11-30 PROCEDURE — 63710000001 INSULIN LISPRO (HUMAN) PER 5 UNITS: Performed by: FAMILY MEDICINE

## 2020-11-30 PROCEDURE — 97116 GAIT TRAINING THERAPY: CPT

## 2020-11-30 RX ORDER — TERAZOSIN 5 MG/1
5 CAPSULE ORAL NIGHTLY
Status: DISCONTINUED | OUTPATIENT
Start: 2020-11-30 | End: 2020-12-03 | Stop reason: HOSPADM

## 2020-11-30 RX ADMIN — DILTIAZEM HYDROCHLORIDE 300 MG: 180 CAPSULE, COATED, EXTENDED RELEASE ORAL at 08:02

## 2020-11-30 RX ADMIN — POLYETHYLENE GLYCOL 3350 17 G: 17 POWDER, FOR SOLUTION ORAL at 08:02

## 2020-11-30 RX ADMIN — TERAZOSIN HYDROCHLORIDE 5 MG: 5 CAPSULE ORAL at 21:46

## 2020-11-30 RX ADMIN — FAMOTIDINE 20 MG: 20 TABLET, FILM COATED ORAL at 08:02

## 2020-11-30 RX ADMIN — INSULIN LISPRO 2 UNITS: 100 INJECTION, SOLUTION INTRAVENOUS; SUBCUTANEOUS at 12:35

## 2020-11-30 RX ADMIN — VENLAFAXINE HYDROCHLORIDE 225 MG: 75 CAPSULE, EXTENDED RELEASE ORAL at 08:04

## 2020-11-30 RX ADMIN — BISACODYL 10 MG: 5 TABLET, COATED ORAL at 08:03

## 2020-11-30 RX ADMIN — CLOPIDOGREL BISULFATE 75 MG: 75 TABLET ORAL at 08:02

## 2020-11-30 RX ADMIN — DOCUSATE SODIUM 100 MG: 100 CAPSULE, LIQUID FILLED ORAL at 08:03

## 2020-11-30 RX ADMIN — INSULIN LISPRO 3 UNITS: 100 INJECTION, SOLUTION INTRAVENOUS; SUBCUTANEOUS at 09:21

## 2020-11-30 RX ADMIN — SODIUM CHLORIDE, PRESERVATIVE FREE 10 ML: 5 INJECTION INTRAVENOUS at 21:48

## 2020-11-30 RX ADMIN — SODIUM CHLORIDE, PRESERVATIVE FREE 10 ML: 5 INJECTION INTRAVENOUS at 08:03

## 2020-11-30 RX ADMIN — INSULIN LISPRO 5 UNITS: 100 INJECTION, SOLUTION INTRAVENOUS; SUBCUTANEOUS at 17:18

## 2020-11-30 RX ADMIN — ATORVASTATIN CALCIUM 80 MG: 40 TABLET, FILM COATED ORAL at 21:47

## 2020-11-30 RX ADMIN — FAMOTIDINE 20 MG: 20 TABLET, FILM COATED ORAL at 17:18

## 2020-11-30 RX ADMIN — DOCUSATE SODIUM 100 MG: 100 CAPSULE, LIQUID FILLED ORAL at 21:47

## 2020-11-30 RX ADMIN — ASPIRIN 81 MG: 81 TABLET, CHEWABLE ORAL at 08:02

## 2020-11-30 RX ADMIN — INSULIN LISPRO 2 UNITS: 100 INJECTION, SOLUTION INTRAVENOUS; SUBCUTANEOUS at 09:22

## 2020-11-30 RX ADMIN — BISACODYL 10 MG: 10 SUPPOSITORY RECTAL at 09:22

## 2020-11-30 RX ADMIN — MINERAL OIL 1 ENEMA: 100 ENEMA RECTAL at 11:41

## 2020-11-30 RX ADMIN — CARVEDILOL 6.25 MG: 3.12 TABLET, FILM COATED ORAL at 17:18

## 2020-11-30 RX ADMIN — MAGNESIUM OXIDE TAB 400 MG (241.3 MG ELEMENTAL MG) 400 MG: 400 (241.3 MG) TAB at 21:47

## 2020-11-30 RX ADMIN — CARVEDILOL 6.25 MG: 3.12 TABLET, FILM COATED ORAL at 08:03

## 2020-11-30 RX ADMIN — MELATONIN TAB 5 MG 5 MG: 5 TAB at 21:47

## 2020-11-30 RX ADMIN — INSULIN LISPRO 2 UNITS: 100 INJECTION, SOLUTION INTRAVENOUS; SUBCUTANEOUS at 17:18

## 2020-11-30 RX ADMIN — MAGNESIUM OXIDE TAB 400 MG (241.3 MG ELEMENTAL MG) 400 MG: 400 (241.3 MG) TAB at 08:02

## 2020-11-30 RX ADMIN — LOSARTAN POTASSIUM 100 MG: 50 TABLET, FILM COATED ORAL at 08:02

## 2020-11-30 RX ADMIN — HYDROXYZINE HYDROCHLORIDE 25 MG: 25 TABLET, FILM COATED ORAL at 21:47

## 2020-11-30 RX ADMIN — INSULIN DETEMIR 12 UNITS: 100 INJECTION, SOLUTION SUBCUTANEOUS at 21:51

## 2020-12-01 LAB
APTT SCREEN TO CONFIRM RATIO: 1.03 RATIO (ref 0–1.4)
B2 GLYCOPROT1 IGA SER-ACNC: <9 GPI IGA UNITS (ref 0–25)
B2 GLYCOPROT1 IGG SER-ACNC: <9 GPI IGG UNITS (ref 0–20)
B2 GLYCOPROT1 IGM SER-ACNC: <9 GPI IGM UNITS (ref 0–32)
BH CV ECHO MEAS - BSA(HAYCOCK): 1.9 M^2
BH CV ECHO MEAS - BSA: 1.8 M^2
BH CV ECHO MEAS - BZI_BMI: 29.4 KILOGRAMS/M^2
BH CV ECHO MEAS - BZI_METRIC_HEIGHT: 162.6 CM
BH CV ECHO MEAS - BZI_METRIC_WEIGHT: 77.6 KG
BH CV ECHO MEAS - DIST REN A EDV LEFT: 38 CM/SEC
BH CV ECHO MEAS - DIST REN A PSV LEFT: 147 CM/SEC
BH CV ECHO MEAS - DIST REN A RI LEFT: 0.74
BH CV ECHO MEAS - MID REN A EDV LEFT: 31.5 CM/SEC
BH CV ECHO MEAS - MID REN A PSV LEFT: 134 CM/SEC
BH CV ECHO MEAS - MID REN A RI LEFT: 0.76
BH CV ECHO MEAS - PROX REN A EDV LEFT: 42.4 CM/SEC
BH CV ECHO MEAS - PROX REN A PSV LEFT: 178 CM/SEC
BH CV ECHO MEAS - PROX REN A RI LEFT: 0.76
BH CV VAS BP RIGHT ARM: NORMAL MMHG
BH CV VAS KIDNEY HEIGHT LEFT: 4.6 CM
BH CV VAS RENAL AORTIC MID PSV: 89 CM/S
BH CV XCLRA SUP ARC RI LEFT: 0.66
BH CV XLRA MEAS - KID L LEFT: 12.8 CM
BH CV XLRA MEAS - RENAL A ORG RI LEFT: 0.75
BH CV XLRA MEAS - SUP ARC PSV LEFT: 30 CM/SEC
BH CV XLRA MEAS - SUP REN AO EDV: 65.5 CM/SEC
BH CV XLRA MEAS - SUP REN AO PSV: 90 CM/SEC
BH CV XLRA MEAS - SUP REN AO RI: 0.27
BH CV XLRA MEAS - SUP REN AO S/D: 1.4
BH CV XLRA MEAS - SUP SEG EDV LEFT: 14.1 CM/SEC
BH CV XLRA MEAS - SUP SEG PSV LEFT: 44.5 CM/SEC
BH CV XLRA MEAS - SUP SEG RI LEFT: 0.68
BH CV XLRA MEAS DIST REN A EDV RIGHT: 27.2 CM/SEC
BH CV XLRA MEAS DIST REN A PSV RIGHT: 126 CM/SEC
BH CV XLRA MEAS DIST REN A RI RIGHT: 0.78
BH CV XLRA MEAS INF ARC EDV LEFT: 9.2 CM/SEC
BH CV XLRA MEAS INF ARC EDV RIGHT: 8.3 CM/SEC
BH CV XLRA MEAS INF ARC PSV LEFT: 31.3 CM/SEC
BH CV XLRA MEAS INF ARC PSV RIGHT: 25.7 CM/SEC
BH CV XLRA MEAS INF ARC RI LEFT: 0.71
BH CV XLRA MEAS INF ARC RI RIGHT: 0.68
BH CV XLRA MEAS INF SEG EDV LEFT: 16.8 CM/SEC
BH CV XLRA MEAS INF SEG EDV RIGHT: 16.2 CM/SEC
BH CV XLRA MEAS INF SEG PSV LEFT: 69.5 CM/SEC
BH CV XLRA MEAS INF SEG PSV RIGHT: 50.7 CM/SEC
BH CV XLRA MEAS INF SEG RI LEFT: 0.76
BH CV XLRA MEAS INF SEG RI RIGHT: 0.68
BH CV XLRA MEAS KID H RIGHT: 6.3 CM
BH CV XLRA MEAS KID L RIGHT: 11.9 CM
BH CV XLRA MEAS KID W RIGHT: 6.1 CM
BH CV XLRA MEAS MID REN A EDV RIGHT: 46.7 CM/SEC
BH CV XLRA MEAS MID REN A PSV RIGHT: 172 CM/SEC
BH CV XLRA MEAS MID REN A RI RIGHT: 0.72
BH CV XLRA MEAS PROX REN A EDV RIGHT: 41.3 CM/SEC
BH CV XLRA MEAS PROX REN A PSV RIGHT: 152 CM/SEC
BH CV XLRA MEAS PROX REN A RI RIGHT: 0.72
BH CV XLRA MEAS RAR LEFT: 2
BH CV XLRA MEAS RAR RIGHT: 1.93
BH CV XLRA MEAS RENAL A ORG EDV LEFT: 39.7 CM/SEC
BH CV XLRA MEAS RENAL A ORG EDV RIGHT: 55.4 CM/SEC
BH CV XLRA MEAS RENAL A ORG PSV LEFT: 159 CM/SEC
BH CV XLRA MEAS RENAL A ORG PSV RIGHT: 162.9 CM/SEC
BH CV XLRA MEAS RENAL A ORG RI RIGHT: 0.65
BH CV XLRA MEAS SUP ARC EDV RIGHT: 7.9 CM/SEC
BH CV XLRA MEAS SUP ARC PSV RIGHT: 26.6 CM/SEC
BH CV XLRA MEAS SUP ARC RI RIGHT: 0.7
BH CV XLRA MEAS SUP SEG EDV RIGHT: -16.8 CM/SEC
BH CV XLRA MEAS SUP SEG PSV RIGHT: -63 CM/SEC
BH CV XLRA MEAS SUP SEG RI RIGHT: 0.73
BH CV XLRA SUP ARC EDV LEFT: 10.3 CM/SEC
CONFIRM APTT/NORMAL: 36.5 SEC (ref 0–55)
GLUCOSE BLDC GLUCOMTR-MCNC: 148 MG/DL (ref 70–130)
GLUCOSE BLDC GLUCOMTR-MCNC: 171 MG/DL (ref 70–130)
GLUCOSE BLDC GLUCOMTR-MCNC: 192 MG/DL (ref 70–130)
GLUCOSE BLDC GLUCOMTR-MCNC: 231 MG/DL (ref 70–130)
LA 2 SCREEN W REFLEX-IMP: ABNORMAL
LEFT KIDNEY WIDTH: 6.1 CM
SCREEN APTT: 40.7 SEC (ref 0–51.9)
SCREEN DRVVT: 37.5 SEC (ref 0–47)
THROMBIN TIME: 82 SEC (ref 0–23)
TT IMM NP PPP: 46.1 SEC (ref 0–23)
TT P HPASE PPP: 20.6 SEC (ref 0–23)

## 2020-12-01 PROCEDURE — 63710000001 INSULIN LISPRO (HUMAN) PER 5 UNITS: Performed by: NURSE PRACTITIONER

## 2020-12-01 PROCEDURE — 63710000001 INSULIN LISPRO (HUMAN) PER 5 UNITS: Performed by: FAMILY MEDICINE

## 2020-12-01 PROCEDURE — 99232 SBSQ HOSP IP/OBS MODERATE 35: CPT | Performed by: NURSE PRACTITIONER

## 2020-12-01 PROCEDURE — G0108 DIAB MANAGE TRN  PER INDIV: HCPCS

## 2020-12-01 PROCEDURE — 63710000001 INSULIN DETEMIR PER 5 UNITS: Performed by: FAMILY MEDICINE

## 2020-12-01 PROCEDURE — 82962 GLUCOSE BLOOD TEST: CPT

## 2020-12-01 PROCEDURE — 97530 THERAPEUTIC ACTIVITIES: CPT

## 2020-12-01 PROCEDURE — 97110 THERAPEUTIC EXERCISES: CPT

## 2020-12-01 PROCEDURE — 97116 GAIT TRAINING THERAPY: CPT

## 2020-12-01 RX ORDER — POLYETHYLENE GLYCOL 3350 17 G/17G
17 POWDER, FOR SOLUTION ORAL 2 TIMES DAILY
Status: DISCONTINUED | OUTPATIENT
Start: 2020-12-01 | End: 2020-12-03 | Stop reason: HOSPADM

## 2020-12-01 RX ADMIN — BISACODYL 10 MG: 5 TABLET, COATED ORAL at 08:46

## 2020-12-01 RX ADMIN — FAMOTIDINE 20 MG: 20 TABLET, FILM COATED ORAL at 08:47

## 2020-12-01 RX ADMIN — VENLAFAXINE HYDROCHLORIDE 225 MG: 75 CAPSULE, EXTENDED RELEASE ORAL at 09:00

## 2020-12-01 RX ADMIN — TERAZOSIN HYDROCHLORIDE 5 MG: 5 CAPSULE ORAL at 21:52

## 2020-12-01 RX ADMIN — DOCUSATE SODIUM 100 MG: 100 CAPSULE, LIQUID FILLED ORAL at 08:47

## 2020-12-01 RX ADMIN — CLOPIDOGREL BISULFATE 75 MG: 75 TABLET ORAL at 08:48

## 2020-12-01 RX ADMIN — ASPIRIN 81 MG: 81 TABLET, CHEWABLE ORAL at 08:47

## 2020-12-01 RX ADMIN — INSULIN LISPRO 7 UNITS: 100 INJECTION, SOLUTION INTRAVENOUS; SUBCUTANEOUS at 17:50

## 2020-12-01 RX ADMIN — POLYETHYLENE GLYCOL 3350 17 G: 17 POWDER, FOR SOLUTION ORAL at 08:48

## 2020-12-01 RX ADMIN — INSULIN LISPRO 5 UNITS: 100 INJECTION, SOLUTION INTRAVENOUS; SUBCUTANEOUS at 08:46

## 2020-12-01 RX ADMIN — SODIUM CHLORIDE, PRESERVATIVE FREE 10 ML: 5 INJECTION INTRAVENOUS at 21:52

## 2020-12-01 RX ADMIN — MELATONIN TAB 5 MG 5 MG: 5 TAB at 21:53

## 2020-12-01 RX ADMIN — HYDROXYZINE HYDROCHLORIDE 25 MG: 25 TABLET, FILM COATED ORAL at 21:53

## 2020-12-01 RX ADMIN — FAMOTIDINE 20 MG: 20 TABLET, FILM COATED ORAL at 17:50

## 2020-12-01 RX ADMIN — INSULIN LISPRO 4 UNITS: 100 INJECTION, SOLUTION INTRAVENOUS; SUBCUTANEOUS at 12:20

## 2020-12-01 RX ADMIN — SODIUM CHLORIDE, PRESERVATIVE FREE 10 ML: 5 INJECTION INTRAVENOUS at 08:48

## 2020-12-01 RX ADMIN — CARVEDILOL 6.25 MG: 3.12 TABLET, FILM COATED ORAL at 08:47

## 2020-12-01 RX ADMIN — LOSARTAN POTASSIUM 100 MG: 50 TABLET, FILM COATED ORAL at 09:00

## 2020-12-01 RX ADMIN — INSULIN LISPRO 5 UNITS: 100 INJECTION, SOLUTION INTRAVENOUS; SUBCUTANEOUS at 12:20

## 2020-12-01 RX ADMIN — MAGNESIUM OXIDE TAB 400 MG (241.3 MG ELEMENTAL MG) 400 MG: 400 (241.3 MG) TAB at 21:53

## 2020-12-01 RX ADMIN — DOCUSATE SODIUM 100 MG: 100 CAPSULE, LIQUID FILLED ORAL at 21:53

## 2020-12-01 RX ADMIN — INSULIN DETEMIR 12 UNITS: 100 INJECTION, SOLUTION SUBCUTANEOUS at 21:53

## 2020-12-01 RX ADMIN — DILTIAZEM HYDROCHLORIDE 300 MG: 180 CAPSULE, COATED, EXTENDED RELEASE ORAL at 08:46

## 2020-12-01 RX ADMIN — INSULIN LISPRO 2 UNITS: 100 INJECTION, SOLUTION INTRAVENOUS; SUBCUTANEOUS at 08:49

## 2020-12-01 RX ADMIN — MAGNESIUM OXIDE TAB 400 MG (241.3 MG ELEMENTAL MG) 400 MG: 400 (241.3 MG) TAB at 08:46

## 2020-12-01 RX ADMIN — ATORVASTATIN CALCIUM 80 MG: 40 TABLET, FILM COATED ORAL at 21:52

## 2020-12-01 RX ADMIN — CARVEDILOL 6.25 MG: 3.12 TABLET, FILM COATED ORAL at 17:50

## 2020-12-01 NOTE — PROGRESS NOTES
Continued Stay Note  [facility]     Patient Name: Rosanne Araya  MRN: [id number]  Today's Date: 12/1/2020    Admit Date: 11/26/2020    Discharge Plan     Row Name 12/01/20 1543       Plan    Plan Comments  SW spoke with pt and pt's  at bedside. Pt reports she had an appointment with a new counselor but had to cancel due to being in the hospital. Pt and pt's  are going to call to reschedule the appointment once they have more of a disharge time frame from [facility]. CARMITA also provided additional mental health counselors and resources. Pt and pt's  had no further questions or concerns at this time.        Discharge Codes    No documentation.             THAIS Joseph

## 2020-12-01 NOTE — THERAPY TREATMENT NOTE
Patient Name: Rosanne Araya  : 1965    MRN: [id number]                              Today's Date: 2020       Admit Date: 2020    Visit Dx:     ICD-10-CM ICD-9-CM   1. Acute CVA (cerebrovascular accident) (CMS/[facility])  I63.9 434.91   2. Hypercholesterolemia  E78.00 272.0   3. Essential hypertension  I10 401.9   4. Type 2 diabetes mellitus without complication, without long-term current use of insulin (CMS/[facility])  E11.9 250.00     Patient Active Problem List   Diagnosis   • Uncontrolled type 2 diabetes mellitus with hyperglycemia (CMS/[facility])   • Essential hypertension   • Herpes labialis   • Family history of thyroid disease in mother   • Acute CVA (cerebrovascular accident) (CMS/[facility])   • Nausea   • Hypokalemia     Past Medical History:   Diagnosis Date   • Depression    • Diabetes mellitus (CMS/[facility])    • Herpes    • Hypertension      Past Surgical History:   Procedure Laterality Date   • ADENOIDECTOMY     • BACK SURGERY      disc slipped   •  SECTION     • TONSILLECTOMY     • WISDOM TOOTH EXTRACTION       General Information     Row Name 20 1333          OT Time and Intention    Document Type  therapy note (daily note)  -AN     Mode of Treatment  occupational therapy  -AN     Row Name 20 1333          General Information    Existing Precautions/Restrictions  fall L side weakness, ataxic  -AN     Barriers to Rehab  none identified  -AN     Row Name 20 1333          Cognition    Orientation Status (Cognition)  oriented x 3  -AN     Row Name 20 1333          Safety Issues, Functional Mobility    Safety Issues Affecting Function (Mobility)  awareness of need for assistance;insight into deficits/self-awareness;safety precautions follow-through/compliance  -AN     Impairments Affecting Function (Mobility)  balance;coordination;strength;visual/perceptual  -AN       User Key  (r) = Recorded By, (t) = Taken By, (c) = Cosigned By    Initials Name Provider Type    AN Kamran  Adela ARRINGTON OT Occupational Therapist        Mobility/ADL's     Row Name 12/01/20 1334          Bed Mobility    Bed Mobility  -- pt up in chair  -AN     Row Name 12/01/20 1334          Transfers    Transfers  sit-stand transfer  -AN     Comment (Transfers)  simulation of tub shower txfr with min assist, mod verbal cues for safety techniques  -AN     Sit-Stand [place] (Transfers)  contact guard  -AN     Row Name 12/01/20 1334          Sit-Stand Transfer    Assistive Device (Sit-Stand Transfers)  walker, front-wheeled  -AN     Row Name 12/01/20 1334          Functional Mobility    Functional Mobility- Ind. Level  contact guard assist  -AN     Functional Mobility- Device  rolling walker  -AN     Functional Mobility-Distance (Feet)  50  -AN     Functional Mobility- Comment  pt continues to fatigue at end of mobility, drags and supinate L foot . Balance, hand/eye coordination ex with mobility, CGA  -AN     Row Name 12/01/20 1334          Activities of Daily Living    BADL Assessment/Intervention  lower body dressing;bathing  -AN     Row Name 12/01/20 1334          Lower Body Dressing Assessment/Training    [place] Level (Lower Body Dressing)  don;socks;supervision;set up;pants/bottoms;contact guard assist  -AN     Position (Lower Body Dressing)  supported sitting;supported standing  -AN     Comment (Lower Body Dressing)  CGA for standing for pull up with donning pants simulation; cues to remind pt of safety techniques with RW  -AN     Row Name 12/01/20 1334          Bathing Assessment/Intervention    Comment (Bathing)  simulated min assist for LB Bathing this date. Recommneded sitting for washing hair, fall risk  -AN       User Key  (r) = Recorded By, (t) = Taken By, (c) = Cosigned By    Initials Name Provider Type    AN Adela Andrew OT Occupational Therapist        Obj/Interventions     Row Name 12/01/20 1337          Sensory Interventions    Comment, Sensory Intervention  improved attending to L side, clear  of doorframes and objects on left, only 1 verbal cue needed to return to R side of hallway with others coming toward her  -AN     Row Name 12/01/20 5887          Vision Assessment/Intervention    Visual Impairment/Limitations  -- better hand eye, finger to nose accuracy this date, mild deficits  -AN     Row Name 12/01/20 1337          Balance    Static Sitting Balance  WFL  -AN     Dynamic Sitting Balance  WFL;sitting in chair  -AN     Static Standing Balance  WFL;supported  -AN     Dynamic Standing Balance  mild impairment  -AN     Balance Interventions  dynamic reaching;occupation based/functional task  -AN     Comment, Balance  no LOB  -AN       User Key  (r) = Recorded By, (t) = Taken By, (c) = Cosigned By    Initials Name Provider Type    Adela Cobb, OT Occupational Therapist        Goals/Plan    No documentation.       Clinical Impression     Row Name 12/01/20 1339          Pain Scale: Numbers Pre/Post-Treatment    Pretreatment Pain Rating  0/10 - no pain  -AN     Posttreatment Pain Rating  0/10 - no pain  -AN     Row Name 12/01/20 1339          Plan of Care Review    Progress  improving  -AN     Outcome Summary  Pt improved to CGA with sit to stand and functional mobility this date. Pt CGA for dynamic reach for ADL items at various heights. Pt simulated tub shower txfr with grab bars with min assist. Pt is set up with donning socks and simulated CGA to min assist with LB dressing. Pt is still a good candidate for IRF, as she was I prior to admit. Pt currently needs assist with all mobility and ADLS.  -AN     Row Name 12/01/20 1336          Therapy Assessment/Plan (OT)    Patient/Family Therapy Goal Statement (OT)  pt agreeable to rehab  -AN     Row Name 12/01/20 1339          Therapy Plan Review/Discharge Plan (OT)    Anticipated Discharge Disposition (OT)  inpatient rehabilitation facility  -AN     Row Name 12/01/20 1338          Positioning and Restraints    Pre-Treatment Position  sitting in  chair/recliner  -AN     Post Treatment Position  chair  -AN     In Chair  notified nsg;sitting;call light within reach;encouraged to call for assist;with family/caregiver  -AN       User Key  (r) = Recorded By, (t) = Taken By, (c) = Cosigned By    Initials Name Provider Type    Adela Cobb OT Occupational Therapist        Outcome Measures     Row Name 12/01/20 1343 12/01/20 1342       How much help from another is currently needed...    Putting on and taking off regular lower body clothing?  3  -AN  3  -AN    Bathing (including washing, rinsing, and drying)  2  -AN  --    Toileting (which includes using toilet bed pan or urinal)  3  -AN  --    Putting on and taking off regular upper body clothing  3  -AN  --    Taking care of personal grooming (such as brushing teeth)  4  -AN  --    Eating meals  4  -AN  --    AM-PAC 6 Clicks Score (OT)  19  -AN  --    Row Name 12/01/20 1343          Modified Jennifer Scale    Modified Jennifer Scale  3 - Moderate disability.  Requiring some help, but able to walk without assistance.  -AN       User Key  (r) = Recorded By, (t) = Taken By, (c) = Cosigned By    Initials Name Provider Type    Adela Cobb OT Occupational Therapist        Occupational Therapy Education                 Title: PT OT SLP Therapies (In Progress)     Topic: Occupational Therapy (In Progress)     Point: ADL training (Done)     Description:   Instruct learner(s) on proper safety adaptation and remediation techniques during self care or transfers.   Instruct in proper use of assistive devices.              Learning Progress Summary           Patient Acceptance, E, NR,VU by AN at 12/1/2020 1310    Comment: ADL retraining, safety with txfrs.    Acceptance, E, VU,NR by AN at 11/27/2020 1315    Comment: Educated pt on current deficits, OT role.   Significant Other Acceptance, E, NR,VU by AN at 12/1/2020 1310    Comment: ADL retraining, safety with txfrs.                   Point: Home exercise program (Not  Started)     Description:   Instruct learner(s) on appropriate technique for monitoring, assisting and/or progressing therapeutic exercises/activities.              Learner Progress:  Not documented in this visit.          Point: Precautions (Not Started)     Description:   Instruct learner(s) on prescribed precautions during self-care and functional transfers.              Learner Progress:  Not documented in this visit.          Point: Body mechanics (Not Started)     Description:   Instruct learner(s) on proper positioning and spine alignment during self-care, functional mobility activities and/or exercises.              Learner Progress:  Not documented in this visit.                      User Key     Initials Effective Dates Name Provider Type Discipline     06/22/15 -  Adela Andrew OT Occupational Therapist OT              OT Recommendation and Plan  Therapy Frequency (OT): daily  Plan of Care Review  Plan of Care Reviewed With: patient  Progress: improving  Outcome Summary: Pt improved to CGA with sit to stand and functional mobility this date. Pt CGA for dynamic reach for ADL items at various heights. Pt simulated tub shower txfr with grab bars with min assist. Pt is set up with donning socks and simulated CGA to min assist with LB dressing. Pt is still a good candidate for IRF, as she was I prior to admit. Pt currently needs assist with all mobility and ADLS.     Time Calculation:   Time Calculation- OT     Row Name 12/01/20 1344             Time Calculation- OT    OT Start Time  1310  -AN      Total Timed Code Minutes- OT  25 minute(s)  -AN      OT Received On  12/01/20  -AN      OT Goal Re-Cert Due Date  12/07/20  -AN        User Key  (r) = Recorded By, (t) = Taken By, (c) = Cosigned By    Initials Name Provider Type    AN Adela Andrew OT Occupational Therapist        Therapy Charges for Today     Code Description Service Date Service Provider Modifiers Qty    [id number]  OT THERAPEUTIC ACT EA  15 MIN 12/1/2020 Adela Andrew, OT GO 2               Adela ARRINGTON. Kamran, RHYS  12/1/2020

## 2020-12-01 NOTE — PLAN OF CARE
Pt has balance and coordination impairments limiting stair goals and gait distance. Pt also has strength and endurance deficits in L LE. Pt STS is CGA X 1. Gait training with RW at Min A X 1 with several bouts of both L knee buckling and hyperextending, needing PT assist to correct gait. Pt stair trains 2 steps at handheld assist on L side ascending at Mod A x 1. Pt has moderate impairments with stairs with increased support required. Pt states fear of returning home with so many steps getting in the house. PT recommends IRF for the pt due to stair training requirements the pt will need to meet to be considered safe to return home.

## 2020-12-01 NOTE — PROGRESS NOTES
Continued Stay Note  [facility]     Patient Name: Rosanne Araya  MRN: [id number]  Today's Date: 12/1/2020    Admit Date: 11/26/2020    Discharge Plan     Row Name 12/01/20 1202       Plan    Plan  [facility]    Patient/Family in Agreement with Plan  yes    Plan Comments  Spoke to April at [facility], she will start precert today. Discussed with pt and family at  and they verbalize understanding. Pt's family will transport to facility.        Discharge Codes    No documentation.             Xi Olmstead RN

## 2020-12-01 NOTE — PROGRESS NOTES
[facility] Medicine Services  PROGRESS NOTE    Patient Name: Rosanne Araya  : 1965  MRN: [id number]    Date of Admission: 2020  Primary Care Physician: Angelica Adame PA-C    Subjective   Subjective     CC:  F/U CVA    HPI:  Patient asking when she can leave for rehab. Tired of being in hospital. Not much rest, otherwise no complaints    Review of Systems  Gen- No fevers, chills  CV- No chest pain, palpitations  Resp- No cough, dyspnea  GI- No N/V/D, abd pain, +constipation     Objective   Objective     Vital Signs:   Temp:  [97.7 °F (36.5 °C)-98.1 °F (36.7 °C)] 98 °F (36.7 °C)  Heart Rate:  [68-96] 78  Resp:  [16-18] 16  BP: (120-145)/(71-97) 128/73  Total (NIH Stroke Scale): 1     Physical Exam:  Constitutional: No acute distress, awake, alert  HENT: NCAT, mucous membranes moist  Respiratory: Clear to auscultation bilaterally, respiratory effort normal   Cardiovascular: RRR, no murmurs, rubs, or gallops, palpable pedal pulses bilaterally  Gastrointestinal: Positive bowel sounds, soft, nontender, nondistended  Musculoskeletal: No bilateral ankle edema  Psychiatric: Appropriate affect, cooperative  Neurologic: Oriented x 3, L UE/LE strength 4/5, speech clear   Skin: No rashes    No change in exam from     Results Reviewed:  Results from last 7 days   Lab Units 20  0528 20  1551 20  1550   WBC 10*3/mm3 11.68* 10.93*  --    HEMOGLOBIN g/dL 14.1 14.3  --    HEMATOCRIT % 41.2 41.3  --    PLATELETS 10*3/mm3 304 358  --    INR   --  1.02 1.2     Results from last 7 days   Lab Units 20  0528 20  2210 20  1103 20  0553 20  1551   SODIUM mmol/L 141  --   --  142 139   POTASSIUM mmol/L 3.9 3.3* 3.0* 2.5* 3.2*   CHLORIDE mmol/L 104  --   --  99 97*   CO2 mmol/L 28.0  --   --  30.0* 29.0   BUN mg/dL 8  --   --  7 13   CREATININE mg/dL 0.60  --   --  0.65 0.82   GLUCOSE mg/dL 177*  --   --  83 124*   CALCIUM mg/dL 9.0  --   --   8.9 9.8   ALT (SGPT) U/L  --   --   --   --  8  <5   AST (SGOT) U/L  --   --   --   --  19  20   TROPONIN T ng/mL  --   --   --   --  <0.010     Estimated Creatinine Clearance: 109.4 mL/min (by C-G formula based on SCr of 0.6 mg/dL).    Microbiology Results Abnormal     Procedure Component Value - Date/Time    COVID PRE-OP / PRE-PROCEDURE SCREENING ORDER (NO ISOLATION) - Swab, Nasopharynx [[id number]]  (Normal) Collected: 11/26/20 1657    Lab Status: Final result Specimen: Swab from Nasopharynx Updated: 11/26/20 1840    Narrative:      The following orders were created for panel order COVID PRE-OP / PRE-PROCEDURE SCREENING ORDER (NO ISOLATION) - Swab, Nasopharynx.  Procedure                               Abnormality         Status                     ---------                               -----------         ------                     Respiratory Panel PCR w/...[[id number]]  Normal              Final result                 Please view results for these tests on the individual orders.    Respiratory Panel PCR w/COVID-19(SARS-CoV-2) PAVEL/JEANINE/MYRA/PAD/COR/MAD/TAVO In-House, NP Swab in UTM/VTM, 3-4 HR TAT - Swab, Nasopharynx [[id number]]  (Normal) Collected: 11/26/20 1657    Lab Status: Final result Specimen: Swab from Nasopharynx Updated: 11/26/20 1840     ADENOVIRUS, PCR Not Detected     Coronavirus 229E Not Detected     Coronavirus HKU1 Not Detected     Coronavirus NL63 Not Detected     Coronavirus OC43 Not Detected     COVID19 Not Detected     Human Metapneumovirus Not Detected     Human Rhinovirus/Enterovirus Not Detected     Influenza A PCR Not Detected     Influenza A H1 Not Detected     Influenza A H1 2009 PCR Not Detected     Influenza A H3 Not Detected     Influenza B PCR Not Detected     Parainfluenza Virus 1 Not Detected     Parainfluenza Virus 2 Not Detected     Parainfluenza Virus 3 Not Detected     Parainfluenza Virus 4 Not Detected     RSV, PCR Not Detected     Bordetella pertussis pcr Not Detected      Bordetella parapertussis PCR Not Detected     Chlamydophila pneumoniae PCR Not Detected     Mycoplasma pneumo by PCR Not Detected    Narrative:      Fact sheet for providers: https://docs.[employer]/wp-content/uploads/UOD6698-9720-IL2.1-EUA-Provider-Fact-Sheet-3.pdf    Fact sheet for patients: https://docs.[employer]/wp-content/uploads/KBI3169-8730-HR8.1-EUA-Patient-Fact-Sheet-1.pdf          Imaging Results (Last 24 Hours)     ** No results found for the last 24 hours. **          Results for orders placed during the hospital encounter of 11/26/20   Adult Transthoracic Echo Complete W/ Cont if Necessary Per Protocol (With Agitated Saline)    Narrative · Left ventricular systolic function is normal. Estimated left ventricular   EF = 65%  · Left ventricular wall thickness is consistent with concentric   hypertrophy.  · The cardiac valves are anatomically and functionally normal.  · Saline test results are negative.          I have reviewed the medications:  Scheduled Meds:aspirin, 81 mg, Oral, Daily  atorvastatin, 80 mg, Oral, Nightly  bisacodyl, 10 mg, Oral, Daily  carvedilol, 6.25 mg, Oral, BID With Meals  clopidogrel, 75 mg, Oral, Daily  dilTIAZem CD, 300 mg, Oral, Daily  docusate sodium, 100 mg, Oral, BID  famotidine, 20 mg, Oral, BID AC  insulin detemir, 12 Units, Subcutaneous, Nightly  insulin lispro, 0-9 Units, Subcutaneous, TID AC  insulin lispro, 7 Units, Subcutaneous, TID With Meals  losartan, 100 mg, Oral, Daily  magnesium oxide, 400 mg, Oral, BID  polyethylene glycol, 17 g, Oral, Daily  sodium chloride, 10 mL, Intravenous, Q12H  terazosin, 5 mg, Oral, Nightly  venlafaxine XR, 225 mg, Oral, Daily      Continuous Infusions:   PRN Meds:.•  acetaminophen  •  bisacodyl  •  dextrose  •  dextrose  •  glucagon (human recombinant)  •  hydrOXYzine  •  melatonin  •  potassium chloride **OR** potassium chloride **OR** potassium chloride  •  sodium chloride  •  sodium chloride    Assessment/Plan   Assessment &  Plan     Active Hospital Problems    Diagnosis  POA   • **Acute CVA (cerebrovascular accident) (CMS/[facility]) [I63.9]  Yes   • Nausea [R11.0]  Yes   • Hypokalemia [E87.6]  Yes   • Uncontrolled type 2 diabetes mellitus with hyperglycemia (CMS/[facility]) [E11.65]  Yes   • Essential hypertension [I10]  Yes      Resolved Hospital Problems   No resolved problems to display.        Brief Hospital Course to date:  Rosanne Araya is a 55 y.o. female with PMHx of DM2 and HTN who presented to the ED with decreased vision on her left side as well as trouble with coordination on the left side as well.     This patient's problems and plans were partially entered by my partner and updated as appropriate by me 12/01/20.    Acute ischemic stroke, right Posterior cerebral artery likely, etiology unknown  -Stroke Neuro has signed off. Follow up outpatient  -Hypercoagulable workup with normal Factor 5 Leiden, Normal Homocysteine, DAVIDE neg  -TTE with normal EF, saline test negative. TCD negative bubble  -Plan for [facility] outpatient   -Pt will need event monitor at DC   -DAPT for 90 days followed by ASA monotherapy- discussed with patient today  - Plan for [facility] at [place]- pending precert     HTN- better control, no change today  -- Continue Cardizem and Losartan  -- Continue Coreg 6.25mg BID  -- Increase Hytrin to 5mg   -- Renal US negative for BALTAZAR    T2DM  --A1C 8.4  --uses orals at home and recently started on once weekly injection that causes her to be nauseated for days (last taken 11/23/20)  --continue levemir, ssi and increase prandial to 7 units  -- will resume home regimen at discharge. Discussed with patient today who will follow up with PCP    Nausea  --pt attributes to ozempic  --RESOLVED     Hypokalemia  --Resolved    Hypomagnesemia  --Continue oral replacement     Depression  -Per patients , she has dealt with a lot of depression  -Asks about mental health services/counselor. SW has discussed with patient  -Continue increased dose  of Effexor, 225mg daily     Constipation  -change miralax to bid    DVT Prophylaxis: Mechanical    Disposition: I expect the patient to be discharged to [facility] when precert completed    CODE STATUS:   Code Status and Medical Interventions:   Ordered at: 11/26/20 5326     Level Of Support Discussed With:    Patient     Code Status:    CPR     Medical Interventions (Level of Support Prior to Arrest):    Full       Daina Knapp, APRN  12/01/20

## 2020-12-01 NOTE — PLAN OF CARE
Goal Outcome Evaluation:  Plan of Care Reviewed With: patient, spouse  Progress: improving  Outcome Summary: Pt improved to CGA with sit to stand and functional mobility this date. Pt CGA for dynamic reach for ADL items at various heights. Pt simulated tub shower txfr with grab bars with min assist. Pt is set up with donning socks and simulated CGA to min assist with LB dressing. Pt is still a good candidate for IRF, as she was I prior to admit. Pt currently needs assist with all mobility and ADLS.

## 2020-12-01 NOTE — THERAPY TREATMENT NOTE
Patient Name: Rosanne Araya  : 1965    MRN: [id number]                              Today's Date: 2020       Admit Date: 2020    Visit Dx:     ICD-10-CM ICD-9-CM   1. Acute CVA (cerebrovascular accident) (CMS/[facility])  I63.9 434.91   2. Hypercholesterolemia  E78.00 272.0   3. Essential hypertension  I10 401.9   4. Type 2 diabetes mellitus without complication, without long-term current use of insulin (CMS/[facility])  E11.9 250.00     Patient Active Problem List   Diagnosis   • Uncontrolled type 2 diabetes mellitus with hyperglycemia (CMS/[facility])   • Essential hypertension   • Herpes labialis   • Family history of thyroid disease in mother   • Acute CVA (cerebrovascular accident) (CMS/[facility])   • Nausea   • Hypokalemia     Past Medical History:   Diagnosis Date   • Depression    • Diabetes mellitus (CMS/[facility])    • Herpes    • Hypertension      Past Surgical History:   Procedure Laterality Date   • ADENOIDECTOMY     • BACK SURGERY      disc slipped   •  SECTION     • TONSILLECTOMY     • WISDOM TOOTH EXTRACTION       General Information     Row Name 20 1425          Physical Therapy Time and Intention    Document Type  therapy note (daily note)  (Pended)   -JT     Mode of Treatment  physical therapy  (Pended)   -JT     Row Name 20 1425          General Information    Patient Profile Reviewed  yes  (Pended)   -JT     Existing Precautions/Restrictions  fall  (Pended)   -JT     Barriers to Rehab  medically complex;previous functional deficit  (Pended)   -JT     Row Name 20 1425          Cognition    Orientation Status (Cognition)  oriented x 4  (Pended)   -JT     Row Name 20 1425          Safety Issues, Functional Mobility    Safety Issues Affecting Function (Mobility)  at risk behavior observed;safety precaution awareness;safety precautions follow-through/compliance;sequencing abilities  (Pended)   -JT     Impairments Affecting Function (Mobility)   balance;coordination;endurance/activity tolerance;strength  (Pended)   -JT     Comment, Safety Issues/Impairments (Mobility)  During gait pt has severa instances of knee buckling and hyperextension, leading to increased fall risk  (Pended)   -JT       User Key  (r) = Recorded By, (t) = Taken By, (c) = Cosigned By    Initials Name Provider Type    JT Anjel Hamilton, PT Student PT Student        Mobility     Row Name 12/01/20 1425          Bed Mobility    Bed Mobility  --  (Pended)  UIC  -JT     Comment (Bed Mobility)  UIC  (Pended)   -JT     Row Name 12/01/20 1425          Transfers    Comment (Transfers)  VCs for proper sequencing and pushing from armrests during ascent  (Pended)   -JT     Row Name 12/01/20 1425          Sit-Stand Transfer    Sit-Stand [place] (Transfers)  contact guard;1 person assist  (Pended)   -JT     Assistive Device (Sit-Stand Transfers)  walker, front-wheeled  (Pended)   -JT     Row Name 12/01/20 1425          Gait/Stairs (Locomotion)    [place] Level (Gait)  minimum assist (75% patient effort)  (Pended)   -JT     Assistive Device (Gait)  walker, front-wheeled  (Pended)   -JT     Distance in Feet (Gait)  150' X 2  (Pended)   -JT     Deviations/Abnormal Patterns (Gait)  goldie decreased;gait speed decreased;weight shifting decreased;base of support, narrow;bilateral deviations  (Pended)   -JT     Left Sided Gait Deviations  foot drop/toe drag;heel strike decreased;knee buckling, left side;leans right;knee hyperextension  (Pended)   -JT     [place] Level (Stairs)  moderate assist (50% patient effort);1 person assist  (Pended)   -JT     Assistive Device (Stairs)  walker, front-wheeled  (Pended)   -JT     Handrail Location (Stairs)  left side (ascending);right side (descending)  (Pended)   -JT     Number of Steps (Stairs)  2  (Pended)   -JT     Ascending Technique (Stairs)  step-to-step  (Pended)   -JT     Descending Technique (Stairs)  step-to-step  (Pended)   -JT     Stairs,  Safety Issues  balance decreased during turns;weight-shifting ability decreased;sequencing ability decreased  (Pended)   -JT     Stairs, Impairments  strength decreased;impaired balance;sensory feedback impaired;sensation decreased  (Pended)   -JT     Comment (Gait/Stairs)  Pt L knee gibran and hyperextends sporadically several times throughout. During stair climbing pt very unsteady and needs extra assistance for balance and strength deficits  (Pended)   -JT       User Key  (r) = Recorded By, (t) = Taken By, (c) = Cosigned By    Initials Name Provider Type    JT Anjel Hamilton, PT Student PT Student        Obj/Interventions     Row Name 12/01/20 1425          Motor Skills    Therapeutic Exercise  hip;knee;ankle  (Pended)   -JT     Row Name 12/01/20 1425          Hip (Therapeutic Exercise)    Hip (Therapeutic Exercise)  isometric exercises;strengthening exercise  (Pended)   -JT     Hip Isometrics (Therapeutic Exercise)  bilateral;aBduction;aDduction;10 repetitions;gluteal sets  (Pended)   -JT     Hip Strengthening (Therapeutic Exercise)  bilateral;marching while seated;10 repetitions  (Pended)   -JT     Row Name 12/01/20 1425          Knee (Therapeutic Exercise)    Knee (Therapeutic Exercise)  AROM (active range of motion);isometric exercises  (Pended)   -JT     Knee AROM (Therapeutic Exercise)  bilateral;SLR (straight leg raise);LAQ (long arc quad);hamstring curls;10 repetitions  (Pended)   -JT     Knee Isometrics (Therapeutic Exercise)  bilateral;quad sets;10 repetitions  (Pended)   -JT     Row Name 12/01/20 1425          Ankle (Therapeutic Exercise)    Ankle (Therapeutic Exercise)  AROM (active range of motion)  (Pended)   -JT     Ankle AROM (Therapeutic Exercise)  bilateral;dorsiflexion;plantarflexion;10 repetitions  (Pended)   -JT     Row Name 12/01/20 1425          Balance    Balance Assessment  sitting static balance;sitting dynamic balance;standing static balance;standing dynamic balance  (Pended)   -JT      Static Sitting Balance  WFL  (Pended)   -JT     Dynamic Sitting Balance  WFL  (Pended)   -JT     Static Standing Balance  WFL  (Pended)   -JT     Dynamic Standing Balance  mild impairment  (Pended)   -JT     Comment, Balance  COB is lower for pt bc of flexed L knee. Pt also has bouts of knee buckling and decreased balance due to this  (Pended)   -JT       User Key  (r) = Recorded By, (t) = Taken By, (c) = Cosigned By    Initials Name Provider Type    Anjel Paul P, PT Student PT Student        Goals/Plan    No documentation.       Clinical Impression     Row Name 12/01/20 1425          Pain    Additional Documentation  Pain Scale: Numbers Pre/Post-Treatment (Group)  (Pended)   -JT     Row Name 12/01/20 142          Pain Scale: Numbers Pre/Post-Treatment    Pretreatment Pain Rating  0/10 - no pain  (Pended)   -JT     Posttreatment Pain Rating  0/10 - no pain  (Pended)   -JT     Row Name 12/01/20 1424          Plan of Care Review    Plan of Care Reviewed With  patient;spouse  (Pended)   -JT     Progress  improving  (Pended)   -JT     Outcome Summary  Pt has balance and coordination impairments limiting stair goals and gait distance. Pt also has strength and endurance deficits in L LE. Pt STS is CGA X 1. Gait training with RW at Min A X 1 with several bouts of both L knee buckling and hyperextending, needing PT assist to correct gait. Pt stair trains 2 steps at handheld assist on L side ascending at mod A X 1. Pt has moderate impairments with stairs with increased support required. Pt states fear of returning home with so many steps getting in the house. PT recommends IRF for the pt due to stair training requirements the pt will need to meet to be considered safe to return home.  (Pended)   -JT     Row Name 12/01/20 1429          Therapy Assessment/Plan (PT)    Patient/Family Therapy Goals Statement (PT)  HEP  (Pended)   -JT     Rehab Potential (PT)  good, to achieve stated therapy goals  (Pended)   -JT      Criteria for Skilled Interventions Met (PT)  yes  (Pended)   -JT     Predicted Duration of Therapy Intervention (PT)  10 days  (Pended)   -JT     Row Name 12/01/20 1425          Positioning and Restraints    Pre-Treatment Position  sitting in chair/recliner  (Pended)   -JT     Post Treatment Position  chair  (Pended)   -JT     In Chair  reclined;notified nsg;call light within reach;encouraged to call for assist;exit alarm on;with family/caregiver  (Pended)   -JT       User Key  (r) = Recorded By, (t) = Taken By, (c) = Cosigned By    Initials Name Provider Type    JT Anjel Hamilton P, PT Student PT Student        Outcome Measures     Row Name 12/01/20 1425          How much help from another person do you currently need...    Turning from your back to your side while in flat bed without using bedrails?  4  (Pended)   -JT     Moving from lying on back to sitting on the side of a flat bed without bedrails?  4  (Pended)   -JT     Moving to and from a bed to a chair (including a wheelchair)?  3  (Pended)   -JT     Standing up from a chair using your arms (e.g., wheelchair, bedside chair)?  3  (Pended)   -JT     Climbing 3-5 steps with a railing?  2  (Pended)   -JT     To walk in hospital room?  3  (Pended)   -JT     AM-PAC 6 Clicks Score (PT)  19  (Pended)   -JT     Row Name 12/01/20 1425          Functional Assessment    Outcome Measure Options  AM-PAC 6 Clicks Basic Mobility (PT)  (Pended)   -JT       User Key  (r) = Recorded By, (t) = Taken By, (c) = Cosigned By    Initials Name Provider Type    JT Anjel Hamilton P, PT Student PT Student        Physical Therapy Education                 Title: PT OT SLP Therapies (In Progress)     Topic: Physical Therapy (Done)     Point: Mobility training (Done)     Learning Progress Summary           Patient Acceptance, E,ZACH, STEFFANY,VINOD,NR by JT at 12/1/2020 1552    Comment: Mobility,  Gait training, HEP and sequencing    Acceptance, SWATHI, STEFFANY,NR by CD at 11/30/2020 1135    Comment: SEE  FLOWSHEET.    Acceptance, E, VU by  at 11/27/2020 1406    Comment: edu on PT services, POC, stroke recovery/rehab, and d/c recommendations   Significant Other Acceptance, E, VU,NR by  at 11/30/2020 1135    Comment: SEE FLOWSHEET.                   Point: Home exercise program (Done)     Learning Progress Summary           Patient Acceptance, E,D, VU,DU,NR by JT at 12/1/2020 1552    Comment: Mobility,  Gait training, HEP and sequencing    Acceptance, E, VU,NR by  at 11/30/2020 1135    Comment: SEE FLOWSHEET.    Acceptance, E, VU by  at 11/27/2020 1406    Comment: edu on PT services, POC, stroke recovery/rehab, and d/c recommendations   Significant Other Acceptance, E, VU,NR by  at 11/30/2020 1135    Comment: SEE FLOWSHEET.                   Point: Body mechanics (Done)     Learning Progress Summary           Patient Acceptance, E,D, VU,DU,NR by JT at 12/1/2020 1552    Comment: Mobility,  Gait training, HEP and sequencing    Acceptance, E, VU,NR by  at 11/30/2020 1135    Comment: SEE FLOWSHEET.    Acceptance, E, VU by  at 11/27/2020 1406    Comment: edu on PT services, POC, stroke recovery/rehab, and d/c recommendations   Significant Other Acceptance, E, VU,NR by  at 11/30/2020 1135    Comment: SEE FLOWSHEET.                   Point: Precautions (Done)     Learning Progress Summary           Patient Acceptance, E,D, VU,DU,NR by JT at 12/1/2020 1552    Comment: Mobility,  Gait training, HEP and sequencing    Acceptance, E, VU,NR by  at 11/30/2020 1135    Comment: SEE FLOWSHEET.    Acceptance, E, VU by  at 11/27/2020 1406    Comment: edu on PT services, POC, stroke recovery/rehab, and d/c recommendations   Significant Other Acceptance, E, VU,NR by  at 11/30/2020 1135    Comment: SEE FLOWSHEET.                               User Key     Initials Effective Dates Name Provider Type Discipline     06/19/15 -  Katherine Friedman PT Physical Therapist PT     09/22/20 -  Varinder Ramirez, PT Physical  Therapist PT    JT 10/16/20 -  Anjel Hamilton, PT Student PT Student PT              PT Recommendation and Plan     Plan of Care Reviewed With: (P) patient, spouse  Progress: (P) improving  Outcome Summary: (P) Pt has balance and coordination impairments limiting stair goals and gait distance. Pt also has strength and endurance deficits in L LE. Pt STS is CGA X 1. Gait training with RW at Min A X 1 with several bouts of both L knee buckling and hyperextending, needing PT assist to correct gait. Pt stair trains 2 steps at handheld assist on L side ascending at mod A X 1. Pt has moderate impairments with stairs with increased support required. Pt states fear of returning home with so many steps getting in the house. PT recommends IRF for the pt due to stair training requirements the pt will need to meet to be considered safe to return home.     Time Calculation:   PT Charges     Row Name 12/01/20 1425             Time Calculation    Start Time  1425  (Pended)   -JT      PT Received On  12/01/20  (Pended)   -JT      PT Goal Re-Cert Due Date  12/07/20  (Pended)   -JT         Time Calculation- PT    Total Timed Code Minutes- PT  40 minute(s)  (Pended)   -JT         Timed Charges    72658 - PT Therapeutic Exercise Minutes  12  (Pended)   -JT      93150 - Gait Training Minutes   28  (Pended)   -JT        User Key  (r) = Recorded By, (t) = Taken By, (c) = Cosigned By    Initials Name Provider Type    JT Anjel Hamilton, PT Student PT Student        Therapy Charges for Today     Code Description Service Date Service Provider Modifiers Qty    59510566138 HC PT THER PROC EA 15 MIN 12/1/2020 Anjel Hamilton, PT Student GP 1    31830500123 HC GAIT TRAINING EA 15 MIN 12/1/2020 Anjel Hamilton, PT Student GP 2          PT G-Codes  Outcome Measure Options: (P) AM-PAC 6 Clicks Basic Mobility (PT)  AM-PAC 6 Clicks Score (PT): (P) 19  AM-PAC 6 Clicks Score (OT): 19  Modified [place] Scale: 3 - Moderate disability.  Requiring some  help, but able to walk without assistance.    Anjel Hamilton, PT Student  12/1/2020

## 2020-12-01 NOTE — CONSULTS
"Diabetes Education    Patient Name:  Rosanne Araya  YOB: 1965  MRN: [id number]  Admit Date:  11/26/2020        Saw Ms. Araya at bedside for diabetes education consult per stroke protocol. She gave permission for visit and her  was at bedside during visit. She was diagnosed with diabetes in 2010, and had GDM in 2009 while pregnant with her son. She confirms she is taking glipizide and metformin at home, and recently started on semaglutide. She states she is taking her semaglutide injection on Monday evenings, and she experiences nausea and vomiting through Thursdays. She is concerned about this side effect. She also recently established care with endocrinology provider CHRIS Drake. She has a meter at home and has been monitoring blood sugars at home since the end of October. She asked about receiving insulin while in the hospital--discussed why insulin is used in the hospital rather than her home medications. She asked if it would be \"better\" to \"just go on insulin at home\". Recommended she discuss w/ her PCP and endo provider; discussed insulin use at home and how it can often be used in conjunction with oral medications or other non-insulin injectables. She also wonders if she should continue the semaglutide given the side effect of nausea/vomiting. Discussed w/ her that this side effect is common with this class of medications, recommended she discuss w/ her providers.  Reviewed type 2 diabetes self-management, risk factors, and importance of blood glucose control to reduce complications. Target blood glucose goals and A1c goals per ADA were reviewed. Signs, symptoms, and treatment of hyperglycemia and hypoglycemia were discussed. Lifestyle changes such as physical activity with MD approval and healthy eating were encouraged. Encouraged to keep record of blood glucose readings to take to each follow up appointment with PCP and endo provider. Provided patient with copy of " "[facility]'s \"What is Diabetes\" handout. Pt was scheduled for outpatient diabetes/stroke follow up class via Zoom telehealth with outpatient diabetes education and given appointment letter stating class date/time. Thank you for the consult.    Electronically signed by:  Raina Chaudhary RN  12/01/20 13:12 EST  "

## 2020-12-02 LAB
ALDOST SERPL-MCNC: 15.7 NG/DL (ref 0–30)
GLUCOSE BLDC GLUCOMTR-MCNC: 124 MG/DL (ref 70–130)
GLUCOSE BLDC GLUCOMTR-MCNC: 162 MG/DL (ref 70–130)
GLUCOSE BLDC GLUCOMTR-MCNC: 198 MG/DL (ref 70–130)
GLUCOSE BLDC GLUCOMTR-MCNC: 208 MG/DL (ref 70–130)
PROT C ACT/NOR PPP CHRO: 156 %

## 2020-12-02 PROCEDURE — 97530 THERAPEUTIC ACTIVITIES: CPT

## 2020-12-02 PROCEDURE — 63710000001 INSULIN LISPRO (HUMAN) PER 5 UNITS: Performed by: NURSE PRACTITIONER

## 2020-12-02 PROCEDURE — 97116 GAIT TRAINING THERAPY: CPT

## 2020-12-02 PROCEDURE — 97110 THERAPEUTIC EXERCISES: CPT

## 2020-12-02 PROCEDURE — 82962 GLUCOSE BLOOD TEST: CPT

## 2020-12-02 PROCEDURE — 99232 SBSQ HOSP IP/OBS MODERATE 35: CPT | Performed by: NURSE PRACTITIONER

## 2020-12-02 PROCEDURE — 63710000001 INSULIN DETEMIR PER 5 UNITS: Performed by: NURSE PRACTITIONER

## 2020-12-02 RX ADMIN — BISACODYL 10 MG: 5 TABLET, COATED ORAL at 08:49

## 2020-12-02 RX ADMIN — POLYETHYLENE GLYCOL 3350 17 G: 17 POWDER, FOR SOLUTION ORAL at 21:40

## 2020-12-02 RX ADMIN — TERAZOSIN HYDROCHLORIDE 5 MG: 5 CAPSULE ORAL at 21:40

## 2020-12-02 RX ADMIN — DOCUSATE SODIUM 100 MG: 100 CAPSULE, LIQUID FILLED ORAL at 08:48

## 2020-12-02 RX ADMIN — MAGNESIUM OXIDE TAB 400 MG (241.3 MG ELEMENTAL MG) 400 MG: 400 (241.3 MG) TAB at 08:48

## 2020-12-02 RX ADMIN — FAMOTIDINE 20 MG: 20 TABLET, FILM COATED ORAL at 17:56

## 2020-12-02 RX ADMIN — DOCUSATE SODIUM 100 MG: 100 CAPSULE, LIQUID FILLED ORAL at 21:40

## 2020-12-02 RX ADMIN — INSULIN LISPRO 7 UNITS: 100 INJECTION, SOLUTION INTRAVENOUS; SUBCUTANEOUS at 12:47

## 2020-12-02 RX ADMIN — CLOPIDOGREL BISULFATE 75 MG: 75 TABLET ORAL at 08:48

## 2020-12-02 RX ADMIN — POLYETHYLENE GLYCOL 3350 17 G: 17 POWDER, FOR SOLUTION ORAL at 08:48

## 2020-12-02 RX ADMIN — ATORVASTATIN CALCIUM 80 MG: 40 TABLET, FILM COATED ORAL at 21:40

## 2020-12-02 RX ADMIN — INSULIN LISPRO 8 UNITS: 100 INJECTION, SOLUTION INTRAVENOUS; SUBCUTANEOUS at 17:57

## 2020-12-02 RX ADMIN — SODIUM CHLORIDE, PRESERVATIVE FREE 10 ML: 5 INJECTION INTRAVENOUS at 21:40

## 2020-12-02 RX ADMIN — INSULIN LISPRO 7 UNITS: 100 INJECTION, SOLUTION INTRAVENOUS; SUBCUTANEOUS at 08:51

## 2020-12-02 RX ADMIN — INSULIN LISPRO 4 UNITS: 100 INJECTION, SOLUTION INTRAVENOUS; SUBCUTANEOUS at 12:48

## 2020-12-02 RX ADMIN — LOSARTAN POTASSIUM 100 MG: 50 TABLET, FILM COATED ORAL at 08:48

## 2020-12-02 RX ADMIN — VENLAFAXINE HYDROCHLORIDE 225 MG: 75 CAPSULE, EXTENDED RELEASE ORAL at 08:47

## 2020-12-02 RX ADMIN — DILTIAZEM HYDROCHLORIDE 300 MG: 180 CAPSULE, COATED, EXTENDED RELEASE ORAL at 08:48

## 2020-12-02 RX ADMIN — FAMOTIDINE 20 MG: 20 TABLET, FILM COATED ORAL at 08:49

## 2020-12-02 RX ADMIN — ASPIRIN 81 MG: 81 TABLET, CHEWABLE ORAL at 08:49

## 2020-12-02 RX ADMIN — INSULIN DETEMIR 14 UNITS: 100 INJECTION, SOLUTION SUBCUTANEOUS at 21:54

## 2020-12-02 RX ADMIN — MELATONIN TAB 5 MG 5 MG: 5 TAB at 21:48

## 2020-12-02 RX ADMIN — CARVEDILOL 6.25 MG: 3.12 TABLET, FILM COATED ORAL at 17:56

## 2020-12-02 RX ADMIN — INSULIN LISPRO 2 UNITS: 100 INJECTION, SOLUTION INTRAVENOUS; SUBCUTANEOUS at 08:50

## 2020-12-02 RX ADMIN — MAGNESIUM OXIDE TAB 400 MG (241.3 MG ELEMENTAL MG) 400 MG: 400 (241.3 MG) TAB at 21:40

## 2020-12-02 RX ADMIN — CARVEDILOL 6.25 MG: 3.12 TABLET, FILM COATED ORAL at 08:49

## 2020-12-02 NOTE — PROGRESS NOTES
Continued Stay Note   [facility]     Patient Name: Rosanne Araya  MRN: [id number]  Today's Date: 12/2/2020    Admit Date: 11/26/2020    Discharge Plan     Row Name 12/02/20 0930       Plan    Plan  [facility]    Patient/Family in Agreement with Plan  yes    Plan Comments  Spoke with April at [facility] and she has doctors approval and we are waiting on Polina frank. CM will continue to follow.    Final Discharge Disposition Code  62 - inpatient rehab facility        Discharge Codes    No documentation.       Expected Discharge Date and Time     Expected Discharge Date Expected Discharge Time    Dec 3, 2020             Johnie Pagan RN

## 2020-12-02 NOTE — PLAN OF CARE
Goal Outcome Evaluation:  Plan of Care Reviewed With: patient  Progress: improving  Pt to d/c tomorrow afternoon. A&Ox4. NSR per monitor. VSS. RA. RN to call cardiology before discharge to place long term heart monitor. Spouse at bedside this shift. Denies c/o pain. Will continue to monitor.

## 2020-12-02 NOTE — PROGRESS NOTES
[facility] Medicine Services  PROGRESS NOTE    Patient Name: Rosanne Araya  : 1965  MRN: [id number]    Date of Admission: 2020  Primary Care Physician: Angelica Adame PA-C    Subjective   Subjective     CC:  F/U CVA    HPI:  Patient has no overnight complaints. Still weak, but otherwise feeling well. Can't understand why still hasn't gotten approved for rehab yet  + BM    Review of Systems  Gen- No fevers, chills  CV- No chest pain, palpitations  Resp- No cough, dyspnea  GI- No N/V/D, abd pain    Objective   Objective     Vital Signs:   Temp:  [98 °F (36.7 °C)-98.6 °F (37 °C)] 98.3 °F (36.8 °C)  Heart Rate:  [69-80] 71  Resp:  [16-18] 18  BP: (129-154)/(74-83) 137/80  Total (NIH Stroke Scale): 1     Physical Exam:  Constitutional: No acute distress, awake, alert, sitting up in chair  HENT: NCAT, mucous membranes moist  Respiratory: Clear to auscultation bilaterally, respiratory effort normal   Cardiovascular: RRR, no murmurs, rubs, or gallops, palpable pedal pulses bilaterally  Gastrointestinal: Positive bowel sounds, soft, nontender, nondistended  Musculoskeletal: No bilateral ankle edema  Psychiatric: flat affect, cooperative  Neurologic: Oriented x 3, HOLLIDAY freely, speech clear   Skin: No rashes    No change in exam from     Results Reviewed:  Results from last 7 days   Lab Units 20  0528 20  1551 20  1550   WBC 10*3/mm3 11.68* 10.93*  --    HEMOGLOBIN g/dL 14.1 14.3  --    HEMATOCRIT % 41.2 41.3  --    PLATELETS 10*3/mm3 304 358  --    INR   --  1.02 1.2     Results from last 7 days   Lab Units 20  0528 20  2210 20  1103 20  0553 20  1551   SODIUM mmol/L 141  --   --  142 139   POTASSIUM mmol/L 3.9 3.3* 3.0* 2.5* 3.2*   CHLORIDE mmol/L 104  --   --  99 97*   CO2 mmol/L 28.0  --   --  30.0* 29.0   BUN mg/dL 8  --   --  7 13   CREATININE mg/dL 0.60  --   --  0.65 0.82   GLUCOSE mg/dL 177*  --   --  83 124*    CALCIUM mg/dL 9.0  --   --  8.9 9.8   ALT (SGPT) U/L  --   --   --   --  8  <5   AST (SGOT) U/L  --   --   --   --  19  20   TROPONIN T ng/mL  --   --   --   --  <0.010     Estimated Creatinine Clearance: 109.4 mL/min (by C-G formula based on SCr of 0.6 mg/dL).    Microbiology Results Abnormal     Procedure Component Value - Date/Time    COVID PRE-OP / PRE-PROCEDURE SCREENING ORDER (NO ISOLATION) - Swab, Nasopharynx [[id number]]  (Normal) Collected: 11/26/20 1657    Lab Status: Final result Specimen: Swab from Nasopharynx Updated: 11/26/20 1840    Narrative:      The following orders were created for panel order COVID PRE-OP / PRE-PROCEDURE SCREENING ORDER (NO ISOLATION) - Swab, Nasopharynx.  Procedure                               Abnormality         Status                     ---------                               -----------         ------                     Respiratory Panel PCR w/...[[id number]]  Normal              Final result                 Please view results for these tests on the individual orders.    Respiratory Panel PCR w/COVID-19(SARS-CoV-2) PAVEL/JEANINE/MYRA/PAD/COR/MAD/TAVO In-House, NP Swab in [place], 3-4 HR TAT - Swab, Nasopharynx [[id number]]  (Normal) Collected: 11/26/20 1657    Lab Status: Final result Specimen: Swab from Nasopharynx Updated: 11/26/20 1840     ADENOVIRUS, PCR Not Detected     Coronavirus 229E Not Detected     Coronavirus HKU1 Not Detected     Coronavirus NL63 Not Detected     Coronavirus OC43 Not Detected     COVID19 Not Detected     Human Metapneumovirus Not Detected     Human Rhinovirus/Enterovirus Not Detected     Influenza A PCR Not Detected     Influenza A H1 Not Detected     Influenza A H1 2009 PCR Not Detected     Influenza A H3 Not Detected     Influenza B PCR Not Detected     Parainfluenza Virus 1 Not Detected     Parainfluenza Virus 2 Not Detected     Parainfluenza Virus 3 Not Detected     Parainfluenza Virus 4 Not Detected     RSV, PCR Not Detected     Bordetella  pertussis pcr Not Detected     Bordetella parapertussis PCR Not Detected     Chlamydophila pneumoniae PCR Not Detected     Mycoplasma pneumo by PCR Not Detected    Narrative:      Fact sheet for providers: https://docs.[employer]/wp-content/uploads/ENS1834-3151-ZL7.1-EUA-Provider-Fact-Sheet-3.pdf    Fact sheet for patients: https://docs.[employer]/wp-content/uploads/TSW0184-6847-VS2.1-EUA-Patient-Fact-Sheet-1.pdf          Imaging Results (Last 24 Hours)     ** No results found for the last 24 hours. **          Results for orders placed during the hospital encounter of 11/26/20   Adult Transthoracic Echo Complete W/ Cont if Necessary Per Protocol (With Agitated Saline)    Narrative · Left ventricular systolic function is normal. Estimated left ventricular   EF = 65%  · Left ventricular wall thickness is consistent with concentric   hypertrophy.  · The cardiac valves are anatomically and functionally normal.  · Saline test results are negative.          I have reviewed the medications:  Scheduled Meds:aspirin, 81 mg, Oral, Daily  atorvastatin, 80 mg, Oral, Nightly  bisacodyl, 10 mg, Oral, Daily  carvedilol, 6.25 mg, Oral, BID With Meals  clopidogrel, 75 mg, Oral, Daily  dilTIAZem CD, 300 mg, Oral, Daily  docusate sodium, 100 mg, Oral, BID  famotidine, 20 mg, Oral, BID AC  insulin detemir, 14 Units, Subcutaneous, Nightly  insulin lispro, 0-9 Units, Subcutaneous, TID AC  insulin lispro, 8 Units, Subcutaneous, TID With Meals  losartan, 100 mg, Oral, Daily  magnesium oxide, 400 mg, Oral, BID  polyethylene glycol, 17 g, Oral, BID  sodium chloride, 10 mL, Intravenous, Q12H  terazosin, 5 mg, Oral, Nightly  venlafaxine XR, 225 mg, Oral, Daily      Continuous Infusions:   PRN Meds:.•  acetaminophen  •  bisacodyl  •  dextrose  •  dextrose  •  glucagon (human recombinant)  •  hydrOXYzine  •  melatonin  •  potassium chloride **OR** potassium chloride **OR** potassium chloride  •  sodium chloride  •  sodium  chloride    Assessment/Plan   Assessment & Plan     Active Hospital Problems    Diagnosis  POA   • **Acute CVA (cerebrovascular accident) (CMS/[facility]) [I63.9]  Yes   • Nausea [R11.0]  Yes   • Hypokalemia [E87.6]  Yes   • Uncontrolled type 2 diabetes mellitus with hyperglycemia (CMS/[facility]) [E11.65]  Yes   • Essential hypertension [I10]  Yes      Resolved Hospital Problems   No resolved problems to display.        Brief Hospital Course to date:  Rosanne Araya is a 55 y.o. female with PMHx of DM2 and HTN who presented to the ED with decreased vision on her left side as well as trouble with coordination on the left side as well.     This patient's problems and plans were partially entered by my partner and updated as appropriate by me 12/02/20.    Acute ischemic stroke, right Posterior cerebral artery likely, etiology unknown  -Stroke Neuro has signed off. Follow up outpatient  -Hypercoagulable workup with normal Factor 5 Leiden, Normal Homocysteine, DAVIDE neg  -TTE with normal EF, saline test negative. TCD negative bubble  -Plan for [facility] outpatient   -Pt will need event monitor at DC   -DAPT for 90 days followed by ASA monotherapy- discussed with patient   - Plan for [facility] [place]- pending precert     HTN- bp stable, no change today  -- Continue Cardizem and Losartan  -- Continue Coreg 6.25mg BID  -- Increase Hytrin to 5mg   -- Renal US negative for BALTAZAR    T2DM  --A1C 8.4  --uses orals at home and recently started on once weekly injection that causes her to be nauseated for days (last taken 11/23/20)  --slightly increase levemir and prandial. Continue ssi  -- will resume home regimen at discharge. Discussed with patient today who will follow up with PCP regarding further management    Nausea  --pt attributes to ozempic  --RESOLVED     Hypokalemia  --Resolved    Hypomagnesemia  --replace prn    Depression  -Per patients , she has dealt with a lot of depression  -Asks about mental health services/counselor. SW has  discussed with patient  -Continue increased dose of Effexor, 225mg daily     Constipation  -changed miralax to bid    DVT Prophylaxis: Mechanical    Disposition: I expect the patient to be discharged to [facility] when precert completed    CODE STATUS:   Code Status and Medical Interventions:   Ordered at: 11/26/20 1636     Level Of Support Discussed With:    Patient     Code Status:    CPR     Medical Interventions (Level of Support Prior to Arrest):    Full       Daina Knapp, APRN  12/02/20

## 2020-12-02 NOTE — PLAN OF CARE
Goal Outcome Evaluation:  Plan of Care Reviewed With: patient  Progress: improving  Outcome Summary: Pt supv for doffing pants and shoes. Pt supv for grooming tasks sinkside this date. Pt. supv for functional mobility in room. Pt scheduled for IRF tomorrow.

## 2020-12-02 NOTE — PROGRESS NOTES
Continued Stay Note  [facility]     Patient Name: Rosanne Araya  MRN: [id number]  Today's Date: 12/2/2020    Admit Date: 11/26/2020    Discharge Plan     Row Name 12/02/20 1410       Plan    Plan  [facility]    Patient/Family in Agreement with Plan  yes    Plan Comments  Spoke with a  from [place] [facility] and they have approval patient for [facility]. Spoke with April at [facility] and the will have a bed tomorrow for the patient. Spoke with patient and she is okay with going tomorrow. [facility] will transport tomorrow afternoon. CM will continue to follow.    Final Discharge Disposition Code  62 - inpatient rehab facility        Discharge Codes    No documentation.       Expected Discharge Date and Time     Expected Discharge Date Expected Discharge Time    Dec 3, 2020             Johnie Pagan RN

## 2020-12-02 NOTE — THERAPY TREATMENT NOTE
Patient Name: Rosanne Araya  : 1965    MRN: [id number]                              Today's Date: 2020       Admit Date: 2020    Visit Dx:     ICD-10-CM ICD-9-CM   1. Acute CVA (cerebrovascular accident) (CMS/[facility])  I63.9 434.91   2. Hypercholesterolemia  E78.00 272.0   3. Essential hypertension  I10 401.9   4. Type 2 diabetes mellitus without complication, without long-term current use of insulin (CMS/[facility])  E11.9 250.00     Patient Active Problem List   Diagnosis   • Uncontrolled type 2 diabetes mellitus with hyperglycemia (CMS/[facility])   • Essential hypertension   • Herpes labialis   • Family history of thyroid disease in mother   • Acute CVA (cerebrovascular accident) (CMS/[facility])   • Nausea   • Hypokalemia     Past Medical History:   Diagnosis Date   • Depression    • Diabetes mellitus (CMS/[facility])    • Herpes    • Hypertension      Past Surgical History:   Procedure Laterality Date   • ADENOIDECTOMY     • BACK SURGERY      disc slipped   •  SECTION     • TONSILLECTOMY     • WISDOM TOOTH EXTRACTION       General Information     Row Name 20 1440          OT Time and Intention    Document Type  therapy note (daily note)  -AN     Mode of Treatment  occupational therapy  -AN     Row Name 20 1440          General Information    Existing Precautions/Restrictions  fall  -AN     Row Name 20 1440          Cognition    Orientation Status (Cognition)  oriented x 4  -AN     Row Name 20 1440          Safety Issues, Functional Mobility    Impairments Affecting Function (Mobility)  strength;balance;muscle tone abnormal  -AN       User Key  (r) = Recorded By, (t) = Taken By, (c) = Cosigned By    Initials Name Provider Type    AN Adela Andrew OT Occupational Therapist        Mobility/ADL's     Row Name 20 1440          Transfers    Sit-Stand [place] (Transfers)  supervision  -AN     Row Name 20 1440          Sit-Stand Transfer    Assistive Device (Sit-Stand  Transfers)  walker, front-wheeled  -AN     Row Name 12/02/20 1440          Functional Mobility    Functional Mobility- Ind. Level  supervision required  -AN     Functional Mobility- Device  rolling walker  -AN     Functional Mobility-Distance (Feet)  15  -AN     Row Name 12/02/20 1440          Activities of Daily Living    BADL Assessment/Intervention  grooming;lower body dressing  -AN     Row Name 12/02/20 1440          Lower Body Dressing Assessment/Training    [place] Level (Lower Body Dressing)  doff;pants/bottoms;shoes/slippers;supervision  -AN     Position (Lower Body Dressing)  unsupported sitting  -AN     Comment (Lower Body Dressing)  pt sitting on couch doffing shoes and pants upon arrival  -AN     Row Name 12/02/20 1440          Grooming Assessment/Training    [place] Level (Grooming)  hair care, combing/brushing;oral care regimen;supervision  -AN     Position (Grooming)  sink side;supported standing  -AN       User Key  (r) = Recorded By, (t) = Taken By, (c) = Cosigned By    Initials Name Provider Type    Adela Cobb OT Occupational Therapist        Obj/Interventions     Row Name 12/02/20 [facility]          Balance    Static Sitting Balance  WFL  -AN     Dynamic Sitting Balance  WFL  -AN     Static Standing Balance  WFL  -AN     Dynamic Standing Balance  mild impairment  -AN     Balance Interventions  occupation based/functional task  -AN       User Key  (r) = Recorded By, (t) = Taken By, (c) = Cosigned By    Initials Name Provider Type    Adela Cobb OT Occupational Therapist        Goals/Plan    No documentation.       Clinical Impression     Row Name 12/02/20 1442          Pain Scale: Numbers Pre/Post-Treatment    Pretreatment Pain Rating  0/10 - no pain  -AN     Posttreatment Pain Rating  0/10 - no pain  -AN     Row Name 12/02/20 [facility]          Plan of Care Review    Plan of Care Reviewed With  patient  -AN     Progress  improving  -AN     Outcome Summary  Pt supv for doffing pants  and shoes. Pt supv for grooming tasks sinkside this date. Pt. supv for functional mobility in room. Pt scheduled for IRF tomorrow.  -AN     Row Name 12/02/20 1442          Therapy Plan Review/Discharge Plan (OT)    Anticipated Discharge Disposition (OT)  inpatient rehabilitation facility  -AN     Row Name 12/02/20 1442          Positioning and Restraints    Pre-Treatment Position  other (comment)  -AN     Post Treatment Position  chair  -AN     In Chair  sitting;call light within reach;with PT;encouraged to call for assist  -AN       User Key  (r) = Recorded By, (t) = Taken By, (c) = Cosigned By    Initials Name Provider Type    Adela Cobb OT Occupational Therapist        Outcome Measures     Row Name 12/02/20 1444          How much help from another is currently needed...    Putting on and taking off regular lower body clothing?  3  -AN     Bathing (including washing, rinsing, and drying)  2  -AN     Toileting (which includes using toilet bed pan or urinal)  3  -AN     Putting on and taking off regular upper body clothing  3  -AN     Taking care of personal grooming (such as brushing teeth)  4  -AN     Eating meals  4  -AN     AM-PAC 6 Clicks Score (OT)  19  -AN     Row Name 12/02/20 1444          Modified Oliver Scale    Modified Oliver Scale  3 - Moderate disability.  Requiring some help, but able to walk without assistance.  -AN       User Key  (r) = Recorded By, (t) = Taken By, (c) = Cosigned By    Initials Name Provider Type    Adela Cobb OT Occupational Therapist        Occupational Therapy Education                 Title: PT OT SLP Therapies (In Progress)     Topic: Occupational Therapy (In Progress)     Point: ADL training (In Progress)     Description:   Instruct learner(s) on proper safety adaptation and remediation techniques during self care or transfers.   Instruct in proper use of assistive devices.              Learning Progress Summary           Patient Acceptance, E, NR by AN at  12/2/2020 1418    Comment: ADL retraining, safety and balance.    Acceptance, E, NR,VU by AN at 12/1/2020 1310    Comment: ADL retraining, safety with txfrs.    Acceptance, E, VU,NR by AN at 11/27/2020 1315    Comment: Educated pt on current deficits, OT role.   Significant Other Acceptance, E, NR,VU by AN at 12/1/2020 1310    Comment: ADL retraining, safety with txfrs.                   Point: Home exercise program (Not Started)     Description:   Instruct learner(s) on appropriate technique for monitoring, assisting and/or progressing therapeutic exercises/activities.              Learner Progress:  Not documented in this visit.          Point: Precautions (Not Started)     Description:   Instruct learner(s) on prescribed precautions during self-care and functional transfers.              Learner Progress:  Not documented in this visit.          Point: Body mechanics (Not Started)     Description:   Instruct learner(s) on proper positioning and spine alignment during self-care, functional mobility activities and/or exercises.              Learner Progress:  Not documented in this visit.                      User Key     Initials Effective Dates Name Provider Type Discipline    AN 06/22/15 -  Adela Andrew OT Occupational Therapist OT              OT Recommendation and Plan  Therapy Frequency (OT): daily  Plan of Care Review  Plan of Care Reviewed With: patient  Progress: improving  Outcome Summary: Pt supv for doffing pants and shoes. Pt supv for grooming tasks sinkside this date. Pt. supv for functional mobility in room. Pt scheduled for IRF tomorrow.     Time Calculation:   Time Calculation- OT     Row Name 12/02/20 1445             Time Calculation- OT    OT Start Time  1418  -AN      Total Timed Code Minutes- OT  10 minute(s)  -AN      OT Received On  12/02/20  -AN      OT Goal Re-Cert Due Date  12/07/20  -AN        User Key  (r) = Recorded By, (t) = Taken By, (c) = Cosigned By    Initials Name Provider Type     Adela Cobb OT Occupational Therapist        Therapy Charges for Today     Code Description Service Date Service Provider Modifiers Qty    99266478687 HC OT THERAPEUTIC ACT EA 15 MIN 12/1/2020 Adela Andrew OT GO 2    [id number] HC OT THERAPEUTIC ACT EA 15 MIN 12/2/2020 Adela Andrew OT GO 1               Adela Andrew OT  12/2/2020

## 2020-12-02 NOTE — THERAPY TREATMENT NOTE
Patient Name: Rosanne Araya  : 1965    MRN: [id number]                              Today's Date: 2020       Admit Date: 2020    Visit Dx:     ICD-10-CM ICD-9-CM   1. Acute CVA (cerebrovascular accident) (CMS/Formerly [facility])  I63.9 434.91   2. Hypercholesterolemia  E78.00 272.0   3. Essential hypertension  I10 401.9   4. Type 2 diabetes mellitus without complication, without long-term current use of insulin (CMS/Formerly [facility])  E11.9 250.00     Patient Active Problem List   Diagnosis   • Uncontrolled type 2 diabetes mellitus with hyperglycemia (CMS/Formerly [facility])   • Essential hypertension   • Herpes labialis   • Family history of thyroid disease in mother   • Acute CVA (cerebrovascular accident) (CMS/Formerly [facility])   • Nausea   • Hypokalemia     Past Medical History:   Diagnosis Date   • Depression    • Diabetes mellitus (CMS/[facility])    • Herpes    • Hypertension      Past Surgical History:   Procedure Laterality Date   • ADENOIDECTOMY     • BACK SURGERY      disc slipped   •  SECTION     • TONSILLECTOMY     • WISDOM TOOTH EXTRACTION       General Information     Row Name 20 145          Physical Therapy Time and Intention    Document Type  therapy note (daily note)  -CD     Mode of Treatment  physical therapy  -CD     Row Name 20 145          General Information    Patient Profile Reviewed  yes  -CD     Existing Precautions/Restrictions  fall  -CD     Barriers to Rehab  medically complex;[place]aker barrier  -CD     Row Name 20 1451          Cognition    Orientation Status (Cognition)  oriented x 3  -CD     Row Name 20 1451          Safety Issues, Functional Mobility    Impairments Affecting Function (Mobility)  strength;balance;coordination  -CD     Comment, Safety Issues/Impairments (Mobility)  PT DEMONSTATES L KNEE INSTABILITY IN STANDING.  -CD       User Key  (r) = Recorded By, (t) = Taken By, (c) = Cosigned By    Initials Name Provider Type    CD Katherine Friedman PT Physical Therapist         Mobility     Row Name 12/02/20 1452          Bed Mobility    Comment (Bed Mobility)  PT [facility] UPON ARRIVAL - JUST FINISHED WITH O.T.  -CD     Row Name 12/02/20 1452          Transfers    Comment (Transfers)  CUES FOR HAND PLACMEENT WITH STAND TO SIT.  -CD     Row Name 12/02/20 1452          Sit-Stand Transfer    Sit-Stand [place] (Transfers)  standby assist  -CD     Assistive Device (Sit-Stand Transfers)  walker, front-wheeled  -CD     Row Name 12/02/20 1452          Gait/Stairs (Locomotion)    [place] Level (Gait)  minimum assist (75% patient effort);verbal cues  -CD     Assistive Device (Gait)  walker, front-wheeled  -CD     Distance in Feet (Gait)  250  -CD     Deviations/Abnormal Patterns (Gait)  goldie decreased;gait speed decreased;weight shifting decreased;base of support, narrow  -CD     Bilateral Gait Deviations  forward flexed posture  -CD     Left Sided Gait Deviations  heel strike decreased;foot drop/toe drag;knee hyperextension  -CD     Comment (Gait/Stairs)  L KNEE INSTABILITY FLUCTUATING BETWEEN BUCKLING AND RECURVATUM. PT GIVEN MANUAL ASSIST IN STANCE PHASE ON L TO PREVENT RECURVATUM. CUES FOR UPRIGHT POSTURE AND FORWARD GAZE.  -CD       User Key  (r) = Recorded By, (t) = Taken By, (c) = Cosigned By    Initials Name Provider Type    CD Katherine Friedman PT Physical Therapist        Obj/Interventions     Row Name 12/02/20 1456          Motor Skills    Motor Skills  coordination  -CD     Coordination  gross motor deficit;left;heel to shin;moderate impairment  -CD     Therapeutic Exercise  -- COMPLETED 1 SET OF 10 REPS EACH, L LE IN SITTING TO INCLUDE: QS, HS, AP'S, LAW, HIP FLEXION. PERFORMED STANDING MINI KNEE BEND WITH FOCUS ON L KNEE CONTROL.  -CD     Row Name 12/02/20 1456          Balance    Balance Assessment  sitting static balance;sitting dynamic balance;standing static balance;standing dynamic balance  -CD     Static Sitting Balance  WFL  -CD     Dynamic Sitting Balance  WFL  -CD      Static Standing Balance  mild impairment;supported;standing  -CD     Dynamic Standing Balance  mild impairment;supported;standing  -CD     Balance Interventions  standing;weight shifting activity;supported;dynamic  -CD       User Key  (r) = Recorded By, (t) = Taken By, (c) = Cosigned By    Initials Name Provider Type    CD Katherine Friedman, PT Physical Therapist        Goals/Plan    No documentation.       Clinical Impression     Row Name 12/02/20 1500          Pain    Additional Documentation  Pain Scale: Numbers Pre/Post-Treatment (Group)  -CD     Row Name 12/02/20 1500          Pain Scale: Numbers Pre/Post-Treatment    Posttreatment Pain Rating  0/10 - no pain  -CD     Row Name 12/02/20 1500          Plan of Care Review    Plan of Care Reviewed With  patient  -CD     Progress  improving  -CD     Outcome Summary  CONTINUES WITH IMPAIRED COORDINATION/WEAKNESS L LE. DEMONSTRATES L KNEE INSTABILITY WITH GAIT. AMBULATED 250 FEET WITH R WALKER AND MIN ASSIST FOR L KNEE CONTROL. PLANS TO GO TO IRF TOMORROW.  -CD     Row Name 12/02/20 1500          Therapy Assessment/Plan (PT)    Patient/Family Therapy Goals Statement (PT)  TO GO TO REHAB.  -CD     Rehab Potential (PT)  good, to achieve stated therapy goals  -CD     Criteria for Skilled Interventions Met (PT)  yes  -CD     Row Name 12/02/20 1500          Vital Signs    Pre Systolic BP Rehab  137  -CD     Pre Treatment Diastolic BP  80  -CD     Post Systolic BP Rehab  135  -CD     Post Treatment Diastolic BP  85  -CD     Pretreatment Heart Rate (beats/min)  70  -CD     Posttreatment Heart Rate (beats/min)  79  -CD     Pre Patient Position  Sitting  -CD     Intra Patient Position  Standing  -CD     Post Patient Position  Sitting  -CD     Row Name 12/02/20 1500          Positioning and Restraints    Pre-Treatment Position  sitting in chair/recliner  -CD     Post Treatment Position  chair  -CD     In Chair  reclined;call light within reach;encouraged to call for assist;legs  elevated;notified nsg EXIT PAD IN CHAIR BUT NO BOX. NSG AWARE.  PT AGREES TO CALL OUT.  -CD       User Key  (r) = Recorded By, (t) = Taken By, (c) = Cosigned By    Initials Name Provider Type    Katherine Cooney PT Physical Therapist        Outcome Measures     Row Name 12/02/20 1504          How much help from another person do you currently need...    Turning from your back to your side while in flat bed without using bedrails?  4  -CD     Moving from lying on back to sitting on the side of a flat bed without bedrails?  4  -CD     Moving to and from a bed to a chair (including a wheelchair)?  3  -CD     Climbing 3-5 steps with a railing?  2  -CD     To walk in hospital room?  3  -CD     Row Name 12/02/20 1507          Modified [place] Scale    Modified Jennifer Scale  3 - Moderate disability.  Requiring some help, but able to walk without assistance. WITH R WX  -CD       User Key  (r) = Recorded By, (t) = Taken By, (c) = Cosigned By    Initials Name Provider Type    Katherine Cooney PT Physical Therapist        Physical Therapy Education                 Title: PT OT SLP Therapies (In Progress)     Topic: Physical Therapy (Done)     Point: Mobility training (Done)     Learning Progress Summary           Patient Acceptance, E, VU,NR by CD at 12/2/2020 1505    Comment: SEE FLOWSHEET.    Acceptance, E,D, VU,DU,NR by JT at 12/1/2020 1552    Comment: Mobility,  Gait training, HEP and sequencing    Acceptance, E, VU,NR by CD at 11/30/2020 1135    Comment: SEE FLOWSHEET.    Acceptance, E, VU by  at 11/27/2020 1406    Comment: edu on PT services, POC, stroke recovery/rehab, and d/c recommendations   Significant Other Acceptance, E, VU,NR by CD at 11/30/2020 1135    Comment: SEE FLOWSHEET.                   Point: Home exercise program (Done)     Learning Progress Summary           Patient Acceptance, E, VU,NR by CD at 12/2/2020 1505    Comment: SEE FLOWSHEET.    Acceptance, E,D, VU,DU,NR by JT at 12/1/2020 1552     Comment: Mobility,  Gait training, HEP and sequencing    Acceptance, E, VU,NR by CD at 11/30/2020 1135    Comment: SEE FLOWSHEET.    Acceptance, E, VU by  at 11/27/2020 1406    Comment: edu on PT services, POC, stroke recovery/rehab, and d/c recommendations   Significant Other Acceptance, E, VU,NR by CD at 11/30/2020 1135    Comment: SEE FLOWSHEET.                   Point: Body mechanics (Done)     Learning Progress Summary           Patient Acceptance, E, VU,NR by CD at 12/2/2020 1505    Comment: SEE FLOWSHEET.    Acceptance, E,D, VU,DU,NR by JT at 12/1/2020 1552    Comment: Mobility,  Gait training, HEP and sequencing    Acceptance, E, VU,NR by  at 11/30/2020 1135    Comment: SEE FLOWSHEET.    Acceptance, E, VU by  at 11/27/2020 1406    Comment: edu on PT services, POC, stroke recovery/rehab, and d/c recommendations   Significant Other Acceptance, E, VU,NR by  at 11/30/2020 1135    Comment: SEE FLOWSHEET.                   Point: Precautions (Done)     Learning Progress Summary           Patient Acceptance, E, VU,NR by  at 12/2/2020 1505    Comment: SEE FLOWSHEET.    Acceptance, E,D, VU,DU,NR by JT at 12/1/2020 1552    Comment: Mobility,  Gait training, HEP and sequencing    Acceptance, E, VU,NR by  at 11/30/2020 1135    Comment: SEE FLOWSHEET.    Acceptance, E, VU by  at 11/27/2020 1406    Comment: edu on PT services, POC, stroke recovery/rehab, and d/c recommendations   Significant Other Acceptance, E, VU,NR by  at 11/30/2020 1135    Comment: SEE FLOWSHEET.                               User Key     Initials Effective Dates Name Provider Type Discipline    CD 06/19/15 -  Katherine Friedman, PT Physical Therapist PT     09/22/20 -  Varinder Ramirez, PT Physical Therapist PT    JT 10/16/20 -  Anjel Hamilton, PT Student PT Student PT              PT Recommendation and Plan     Plan of Care Reviewed With: patient  Progress: improving  Outcome Summary: CONTINUES WITH IMPAIRED COORDINATION/WEAKNESS L PARDEEP  DEMONSTRATES L KNEE INSTABILITY WITH GAIT. AMBULATED 250 FEET WITH R WALKER AND MIN ASSIST FOR L KNEE CONTROL. PLANS TO GO TO [facility] TOMORROW.     Time Calculation:   PT Charges     Row Name 12/02/20 1506             Time Calculation    Start Time  1423  -CD      PT Received On  12/02/20  -CD      PT Goal Re-Cert Due Date  12/07/20  -CD         Time Calculation- PT    Total Timed Code Minutes- PT  24 minute(s)  -CD         Timed Charges    63949 - PT Therapeutic Exercise Minutes  9  -CD      83727 - Gait Training Minutes   15  -CD        User Key  (r) = Recorded By, (t) = Taken By, (c) = Cosigned By    Initials Name Provider Type    CD Katherine Friedman, PT Physical Therapist        Therapy Charges for Today     Code Description Service Date Service Provider Modifiers Qty    05710355915 HC PT THER PROC EA 15 MIN 12/2/2020 Katherine Friedman, PT GP 1    61129932626 HC GAIT TRAINING EA 15 MIN 12/2/2020 Katherine Friedman, PT GP 1          PT G-Codes  Outcome Measure Options: AM-PAC 6 Clicks Basic Mobility (PT)  AM-PAC 6 Clicks Score (PT): 19  AM-PAC 6 Clicks Score (OT): 19  Modified [place] Scale: 3 - Moderate disability.  Requiring some help, but able to walk without assistance.(WITH R WX)    Katherine Friedman, PT  12/2/2020

## 2020-12-02 NOTE — PLAN OF CARE
Goal Outcome Evaluation:  Plan of Care Reviewed With: patient  Progress: improving  Outcome Summary: CONTINUES WITH IMPAIRED COORDINATION/WEAKNESS L LE. DEMONSTRATES L KNEE INSTABILITY WITH GAIT. AMBULATED 250 FEET WITH R WALKER AND MIN ASSIST FOR L KNEE CONTROL. PLANS TO GO TO [facility] TOMORROW.

## 2020-12-03 VITALS
RESPIRATION RATE: 18 BRPM | OXYGEN SATURATION: 94 % | WEIGHT: 171 LBS | DIASTOLIC BLOOD PRESSURE: 91 MMHG | SYSTOLIC BLOOD PRESSURE: 156 MMHG | TEMPERATURE: 97.6 F | BODY MASS INDEX: 29.19 KG/M2 | HEIGHT: 64 IN | HEART RATE: 75 BPM

## 2020-12-03 LAB
GLUCOSE BLDC GLUCOMTR-MCNC: 148 MG/DL (ref 70–130)
GLUCOSE BLDC GLUCOMTR-MCNC: 172 MG/DL (ref 70–130)
PS IGA SER-ACNC: 4 APS IGA (ref 0–20)
PS IGG SER-ACNC: 6 GPS IGG (ref 0–11)
PS IGG SER-ACNC: 6 GPS IGG (ref 0–11)
PS IGM SER-ACNC: 7 MPS IGM (ref 0–25)
PS IGM SER-ACNC: 7 MPS IGM (ref 0–25)

## 2020-12-03 PROCEDURE — 99239 HOSP IP/OBS DSCHRG MGMT >30: CPT | Performed by: NURSE PRACTITIONER

## 2020-12-03 PROCEDURE — 63710000001 INSULIN LISPRO (HUMAN) PER 5 UNITS: Performed by: NURSE PRACTITIONER

## 2020-12-03 PROCEDURE — 82962 GLUCOSE BLOOD TEST: CPT

## 2020-12-03 RX ORDER — ATORVASTATIN CALCIUM 80 MG/1
80 TABLET, FILM COATED ORAL NIGHTLY
Qty: 90 TABLET | Refills: 1 | Status: SHIPPED | OUTPATIENT
Start: 2020-12-03 | End: 2021-01-07 | Stop reason: SDUPTHER

## 2020-12-03 RX ORDER — FAMOTIDINE 20 MG/1
20 TABLET, FILM COATED ORAL
Start: 2020-12-03 | End: 2021-01-07 | Stop reason: SDUPTHER

## 2020-12-03 RX ORDER — CLOPIDOGREL BISULFATE 75 MG/1
75 TABLET ORAL DAILY
Qty: 30 TABLET
Start: 2020-12-04 | End: 2021-01-07 | Stop reason: SDUPTHER

## 2020-12-03 RX ORDER — VENLAFAXINE HYDROCHLORIDE 75 MG/1
225 CAPSULE, EXTENDED RELEASE ORAL DAILY
Qty: 90 CAPSULE
Start: 2020-12-03 | End: 2021-01-07 | Stop reason: SDUPTHER

## 2020-12-03 RX ORDER — PSEUDOEPHEDRINE HCL 30 MG
100 TABLET ORAL 2 TIMES DAILY
Start: 2020-12-03 | End: 2021-02-24

## 2020-12-03 RX ORDER — CHOLECALCIFEROL (VITAMIN D3) 125 MCG
5 CAPSULE ORAL NIGHTLY PRN
Start: 2020-12-03 | End: 2021-01-07

## 2020-12-03 RX ORDER — ASPIRIN 81 MG/1
81 TABLET, CHEWABLE ORAL DAILY
Start: 2020-12-04 | End: 2021-01-07 | Stop reason: SDUPTHER

## 2020-12-03 RX ORDER — POLYETHYLENE GLYCOL 3350 17 G/17G
17 POWDER, FOR SOLUTION ORAL 2 TIMES DAILY
Start: 2020-12-03 | End: 2021-01-07

## 2020-12-03 RX ORDER — TERAZOSIN 5 MG/1
5 CAPSULE ORAL NIGHTLY
Start: 2020-12-03 | End: 2020-12-14

## 2020-12-03 RX ORDER — CARVEDILOL 6.25 MG/1
6.25 TABLET ORAL 2 TIMES DAILY WITH MEALS
Start: 2020-12-03 | End: 2021-01-07

## 2020-12-03 RX ADMIN — BISACODYL 10 MG: 5 TABLET, COATED ORAL at 08:37

## 2020-12-03 RX ADMIN — DILTIAZEM HYDROCHLORIDE 300 MG: 180 CAPSULE, COATED, EXTENDED RELEASE ORAL at 08:36

## 2020-12-03 RX ADMIN — ASPIRIN 81 MG: 81 TABLET, CHEWABLE ORAL at 08:38

## 2020-12-03 RX ADMIN — CARVEDILOL 6.25 MG: 3.12 TABLET, FILM COATED ORAL at 08:38

## 2020-12-03 RX ADMIN — MAGNESIUM OXIDE TAB 400 MG (241.3 MG ELEMENTAL MG) 400 MG: 400 (241.3 MG) TAB at 08:36

## 2020-12-03 RX ADMIN — DOCUSATE SODIUM 100 MG: 100 CAPSULE, LIQUID FILLED ORAL at 08:37

## 2020-12-03 RX ADMIN — FAMOTIDINE 20 MG: 20 TABLET, FILM COATED ORAL at 08:38

## 2020-12-03 RX ADMIN — LOSARTAN POTASSIUM 100 MG: 50 TABLET, FILM COATED ORAL at 08:38

## 2020-12-03 RX ADMIN — CLOPIDOGREL BISULFATE 75 MG: 75 TABLET ORAL at 08:38

## 2020-12-03 RX ADMIN — POLYETHYLENE GLYCOL 3350 17 G: 17 POWDER, FOR SOLUTION ORAL at 08:36

## 2020-12-03 RX ADMIN — INSULIN LISPRO 8 UNITS: 100 INJECTION, SOLUTION INTRAVENOUS; SUBCUTANEOUS at 08:41

## 2020-12-03 RX ADMIN — INSULIN LISPRO 2 UNITS: 100 INJECTION, SOLUTION INTRAVENOUS; SUBCUTANEOUS at 08:38

## 2020-12-03 RX ADMIN — VENLAFAXINE HYDROCHLORIDE 225 MG: 75 CAPSULE, EXTENDED RELEASE ORAL at 08:37

## 2020-12-03 NOTE — DISCHARGE PLACEMENT REQUEST
"Rosanne Rosas (55 y.o. Female)     Date of Birth Social Security Number Address Home Phone MRN    1965  [id number] [id number] [phone] [id number]    Faith Marital Status          None        Admission Date Admission Type Admitting Provider Attending Provider Department, Room/Bed    20 Emergency Jam Brown MD West, Christopher R, MD [facility] 3F, S312/1    Discharge Date Discharge Disposition Discharge Destination         Rehab Facility or Unit (DC - External)              Attending Provider: Jam Brown MD    Allergies: No Known Allergies    Isolation: None   Infection: None   Code Status: CPR    Ht: 162.6 cm (64\")   Wt: 77.6 kg (171 lb)    Admission Cmt: None   Principal Problem: Acute CVA (cerebrovascular accident) (CMS/[facility]) [I63.9]                 Active Insurance as of 2020     Primary Coverage     Payor Plan Insurance Group Employer/Plan Group    [place] [id number]     Payor Plan Address Payor Plan Phone Number Payor Plan Fax Number Effective Dates    [address]   2020 - None Entered    [place] [address]       Subscriber Name Subscriber Birth Date Member ID       ROSANNE ROSAS 1965 [id number]                 Emergency Contacts      (Rel.) Home Phone Work Phone Mobile Phone    Deangelo Rosas (Spouse) -- -- [phone]               Discharge Summary      Neena Stephens, APRN at 20 0854              [facility]  DISCHARGE SUMMARY    Patient Name: Rosanne Rosas  : 1965  MRN: [id number]    Date of Admission: 2020  3:35 PM  Date of Discharge:  12/3/2020  Primary Care Physician: Angelica Adame PA-C    Consults     Date and Time Order Name Status Description    2020 1540 Inpatient Neurology Consult Stroke Completed           Hospital Course     Presenting Problem:   Acute CVA " (cerebrovascular accident) (CMS/[facility]) [I63.9]  Acute CVA (cerebrovascular accident) (CMS/[facility]) [I63.9]    Active Hospital Problems    Diagnosis  POA   • **Acute CVA (cerebrovascular accident) (CMS/[facility]) [I63.9]  Yes   • Nausea [R11.0]  Yes   • Hypokalemia [E87.6]  Yes   • Uncontrolled type 2 diabetes mellitus with hyperglycemia (CMS/[facility]) [E11.65]  Yes   • Essential hypertension [I10]  Yes      Resolved Hospital Problems   No resolved problems to display.          Hospital Course:  Rosanne Araya is a 55 y.o. female with PMHx of DM2 and HTN who presented to the ED with decreased vision on her left side as well as trouble with coordination on the left side as well.       Acute ischemic stroke, right Posterior cerebral artery likely, etiology unknown  -Stroke Neuro has signed off. Follow up outpatient  -Hypercoagulable workup with normal Factor 5 Leiden, Normal Homocysteine, DAVIDE neg  -TTE with normal EF, saline test negative. TCD negative bubble  -Plan for [facility] outpatient   -Pt will need event monitor at [place] referral to heart and valve clinic as outpatient to have monitor placed after discharge from rehab   -DAPT for 90 days followed by ASA monotherapy- discussed with patient   - Plan for [facility] at [place]-     HTN- bp stable, no change today  -- Continue Cardizem and Losartan  -- Continue Coreg 6.25mg BID  -- Increase Hytrin to 5mg   -- Renal US negative for BALTAZAR     T2DM  --A1C 8.4  --uses orals at home and recently started on once weekly injection that causes her to be nauseated for days (last taken 11/23/20)  --slightly increase levemir and prandial. Continue ssi  -- will resume home regimen at discharge. Discussed with patient today who will follow up with PCP regarding further management     Nausea  --pt attributes to ozempic  --RESOLVED      Hypokalemia  --Resolved     Hypomagnesemia  --replace prn     Depression  -Per patients , she has dealt with a lot of depression  -Asks about mental health services/counselor.  SW has discussed with patient  -Continue increased dose of Effexor, 225mg daily      Constipation  -changed miralax to bid    Patient has remained clinically stable and will be discharged to rehab today.       Discharge Follow Up Recommendations for outpatient labs/diagnostics:   follow up with PCP after dc from rehab   Follow up with Neurology 4 weeks     Day of Discharge     HPI:   Patient is resting in bed in NAD. She slept well. Tolerating diet. No acute events overnight per nursing. Plan for rehab today.     Review of Systems  Gen- No fevers, chills  CV- No chest pain, palpitations  Resp- No cough, dyspnea  GI- No N/V/D, abd pain        Vital Signs:   Temp:  [97.6 °F (36.4 °C)-98.5 °F (36.9 °C)] 97.6 °F (36.4 °C)  Heart Rate:  [71-90] 75  Resp:  [16-18] 18  BP: (135-171)/(79-91) 156/91     Physical Exam:  Constitutional: No acute distress, awake, alert  HENT: NCAT, mucous membranes moist  Respiratory: Clear to auscultation bilaterally, respiratory effort normal room air   Cardiovascular: RRR, no murmurs, rubs, or gallops, palpable pedal pulses bilaterally  Gastrointestinal: Positive bowel sounds, soft, nontender, nondistended  Musculoskeletal: No bilateral ankle edema  Psychiatric: Appropriate affect, cooperative  Neurologic: Oriented x 3, strength symmetric in all extremities, Cranial Nerves grossly intact to confrontation, speech clear  Skin: No rashes      Pertinent  and/or Most Recent Results     Results from last 7 days   Lab Units 11/28/20  0528 11/27/20  2210 11/27/20  1103 11/27/20  0553 11/26/20  1551   WBC 10*3/mm3 11.68*  --   --   --  10.93*   HEMOGLOBIN g/dL 14.1  --   --   --  14.3   HEMATOCRIT % 41.2  --   --   --  41.3   PLATELETS 10*3/mm3 304  --   --   --  358   SODIUM mmol/L 141  --   --  142 139   POTASSIUM mmol/L 3.9 3.3* 3.0* 2.5* 3.2*   CHLORIDE mmol/L 104  --   --  99 97*   CO2 mmol/L 28.0  --   --  30.0* 29.0   BUN mg/dL 8  --   --  7 13   CREATININE mg/dL 0.60  --   --  0.65 0.82    GLUCOSE mg/dL 177*  --   --  83 124*   CALCIUM mg/dL 9.0  --   --  8.9 9.8     Results from last 7 days   Lab Units 11/26/20  1551 11/26/20  1550   BILIRUBIN mg/dL 0.6  --    ALK PHOS U/L 88  --    ALT (SGPT) U/L 8  <5  --    AST (SGOT) U/L 19  20  --    PROTIME Seconds 13.1 14.5   INR  1.02 1.2   APTT seconds 30.2  --      Results from last 7 days   Lab Units 11/27/20  0553   CHOLESTEROL mg/dL 122   TRIGLYCERIDES mg/dL 134   HDL CHOL mg/dL 29*   LDL CHOL mg/dL 69     Results from last 7 days   Lab Units 11/27/20  0553 11/26/20  1551   HEMOGLOBIN A1C % 8.40*  --    TROPONIN T ng/mL  --  <0.010       Brief Urine Lab Results  (Last result in the past 365 days)      Color   Clarity   Blood   Leuk Est   Nitrite   Protein   CREAT   Urine HCG        08/05/20 1135             200             Microbiology Results Abnormal     Procedure Component Value - Date/Time    COVID PRE-OP / PRE-PROCEDURE SCREENING ORDER (NO ISOLATION) - Swab, Nasopharynx [[id number]]  (Normal) Collected: 11/26/20 1657    Lab Status: Final result Specimen: Swab from Nasopharynx Updated: 11/26/20 1840    Narrative:      The following orders were created for panel order COVID PRE-OP / PRE-PROCEDURE SCREENING ORDER (NO ISOLATION) - Swab, Nasopharynx.  Procedure                               Abnormality         Status                     ---------                               -----------         ------                     Respiratory Panel PCR w/...[[id number]]  Normal              Final result                 Please view results for these tests on the individual orders.    Respiratory Panel PCR w/COVID-19(SARS-CoV-2) PAVEL/JEANINE/MYRA/PAD/COR/MAD/TAVO In-House, NP Swab in UTM/[facility], 3-4 HR TAT - Swab, Nasopharynx [[id number]]  (Normal) Collected: 11/26/20 1657    Lab Status: Final result Specimen: Swab from Nasopharynx Updated: 11/26/20 1840     ADENOVIRUS, PCR Not Detected     Coronavirus 229E Not Detected     Coronavirus HKU1 Not Detected     Coronavirus NL63  Not Detected     Coronavirus OC43 Not Detected     COVID19 Not Detected     Human Metapneumovirus Not Detected     Human Rhinovirus/Enterovirus Not Detected     Influenza A PCR Not Detected     Influenza A H1 Not Detected     Influenza A H1 2009 PCR Not Detected     Influenza A H3 Not Detected     Influenza B PCR Not Detected     Parainfluenza Virus 1 Not Detected     Parainfluenza Virus 2 Not Detected     Parainfluenza Virus 3 Not Detected     Parainfluenza Virus 4 Not Detected     RSV, PCR Not Detected     Bordetella pertussis pcr Not Detected     Bordetella parapertussis PCR Not Detected     Chlamydophila pneumoniae PCR Not Detected     Mycoplasma pneumo by PCR Not Detected    Narrative:      Fact sheet for providers: https://docs.[employer]/wp-content/uploads/NZZ1020-6136-RD4.1-EUA-Provider-Fact-Sheet-3.pdf    Fact sheet for patients: https://docs.[employer]/wp-content/uploads/HLO8917-7251-AW8.1-EUA-Patient-Fact-Sheet-1.pdf          Imaging Results (All)     Procedure Component Value Units Date/Time    CT Angiogram Neck [[id number]] Collected: 11/26/20 1616     Updated: 11/27/20 2149    Narrative:      EXAMINATION: CT ANGIOGRAM NECK-11/26/2020:      INDICATION: Stroke, follow-up.     TECHNIQUE: Spiral acquisition 3 mm and 0.75 mm axial images through the  neck with sagittal and coronal 10 mm 2-D reconstructions. 3-D VRT and  MIP angiographic reconstructions.     The radiation dose reduction device was turned on for each scan per the  ALARA (As Low as Reasonably Achievable) protocol.     COMPARISON: NONE.     FINDINGS: On the right, no significant common, internal, or external  carotid artery stenosis is seen to the level of the skull base.     On the left, visualization of the proximal common carotid artery is  mildly limited due to dense contrast in the adjacent brachycephalic vein  but no stenosis is suspected. The remainder of the left common carotid  artery shows no evidence of significant stenosis.  There is mild  circumferential noncalcified thrombus of the carotid bulb and left ICA  without evidence of hemodynamically significant stenosis. What appears  to be a small ulcerated plaque of the carotid bulb is actually  calcification within thrombus, with reference to the axial unenhanced  images, specifically unenhanced image 37 of series 7 compared to axial 3  mm image 301 of series 902. No hemodynamically significant ECA stenosis  or ICA stenosis is seen to the level of the skull base.     Vertebral arteries as is typical are seen in a limited fashion  proximally. Elsewhere, they appear codominant and grossly normal to the  level of the foramen magnum.     No incidental pathologic soft tissue neck pathology is appreciated.       Impression:      No evidence of hemodynamically significant carotid or  vertebral artery stenosis in the neck.     D:  11/26/2020  E:  11/27/2020     This report was finalized on 11/27/2020 9:46 PM by Dr. Frank Hinson MD.       CT Cerebral Perfusion With & Without Contrast [[id number]] Collected: 11/26/20 1610     Updated: 11/27/20 2148    Narrative:      EXAMINATION: CT CEREBRAL PERFUSION W WO CONTRAST-  11/26/2020     INDICATION: Neuro deficit, acute, stroke suspected     TECHNIQUE: Cerebral perfusion analysis was performed using computed  tomography with contrast administration including postprocessing of  parametric maps with determination of cerebral blood flow, cerebral  blood volume, mean transit time and time to drain.     The radiation dose reduction device was turned on for each scan per the  ALARA (As Low as Reasonably Achievable) protocol.     COMPARISON: Unenhanced head CT scan of same date     FINDINGS: Rapid analysis shows no areas of the brain with cerebral blood  flow less than 30%. There appear to be a couple of patchy areas of  potential ischemic tissue, totaling 15 mL in the right temporal  occipital region, where T MAX is greater than six seconds. The  individual  perfusion maps show a more well-defined, confluent  abnormality in expected location of the right posterior cerebral artery  territory, with significantly increased time to drain, T MAX, and mean  transit time. This covers a larger area than typically associated with  posterior cerebral artery, perhaps variant vascular anatomy. Cerebral  blood flow is decreased to a lesser extent. Cerebral blood volume  appears within expected limits, best to slightly increased in the  occipital region. No significant asymmetry is appreciated elsewhere.       Impression:      Findings consistent with focal right PCA territory ischemia,  and larger area of slow flow. No apparent core infarct.     D:  11/26/2020  E:  11/27/2020     This report was finalized on 11/27/2020 9:45 PM by Dr. Frank Hinson MD.       CT Angiogram Head [[id number]] Collected: 11/26/20 1623     Updated: 11/27/20 2147    Narrative:      EXAMINATION: CT ANGIOGRAM HEAD-  11/26/2020     INDICATION: Stroke, follow up     TECHNIQUE: 3 mm and 0.75 mm pre and postcontrast images through the  brain with multiplanar 2-D reconstructions and 3-D VRT and MIP  angiographic reconstruction.     The radiation dose reduction device was turned on for each scan per the  ALARA (As Low as Reasonably Achievable) protocol.     COMPARISON: Cerebral perfusion exam of same date     FINDINGS: Intracranial vertebral arteries are relatively small in  caliber. Basilar artery is a small to medium size vessel. There appears  to be a prominent left-sided posterior communicating artery. Left  posterior cerebral artery appears grossly normal. Right posterior  cerebral artery appears truncated, as can best be appreciated on axial  thin section image 305, series 5 and adjacent images, also corresponding  sagittal MIP image 25 series 903.     Regarding anterior circulation, the petrous portions of the right and  left internal arteries appear grossly normal. There is less than 50%  narrowing of the right  cavernous carotid, and potentially greater than  50% narrowing of the left cavernous carotid, as seen on axial thin  section images 284 through 287 series 5. There appears to be focal mural  thrombus in the right ICA, axial image 291 series 5. Beyond this level,  supraclinoid right and left ICA appear normal. No significant anterior  or middle cerebral artery stenosis is identified.       Impression:      1. Truncated appearance of the proximal right posterior cerebral artery  consistent with patient's perfusion scan abnormality.  2. Potentially 50% or greater distal left cavernous carotid artery  stenosis.     D:  11/26/2020  E:  11/27/2020        This report was finalized on 11/27/2020 9:44 PM by Dr. Frank Hinson MD.       XR Chest 1 View [[id number]] Collected: 11/26/20 1629     Updated: 11/27/20 2144    Narrative:      EXAMINATION: XR CHEST 1 VW-11/26/2020:     INDICATION: Acute Stroke Protocol (onset < 12 hrs).     COMPARISON: NONE.     FINDINGS: The heart shadow is in the upper range of normal size. The  vasculature appears normal. The lungs appear well expanded and clear.       Impression:      No evidence of active chest disease.     D:  11/26/2020  E:  11/27/2020            This report was finalized on 11/27/2020 9:41 PM by Dr. Frank Hinson MD.       MRI Brain Without Contrast [[id number]] Collected: 11/26/20 1912     Updated: 11/26/20 1914    Narrative:      MRI Brain WO    INDICATION:   Numbness and weakness on left side and blurred vision since 11:00 AM yesterday    TECHNIQUE:   MRI of the brain without IV contrast.    COMPARISON:    CTA head and neck with CT perfusion study and CT head done earlier today    FINDINGS:  The pituitary gland and foramen magnum region have a normal appearance. The ventricles and subarachnoid spaces are normal. The diffusion series is normal. In particular there is no evidence of posterior cerebral territory ischemic change. The FLAIR  series shows a few scattered bright foci in  the deep and subcortical white matter consistent with small vessel ischemic changes.      Impression:      Scattered small old small vessel ischemic changes.     No evidence of recent ischemia    Signer Name: Joe Greenberg MD   Signed: 11/26/2020 7:12 PM   Workstation Name: [email or web address]    [facility]    CT Head Without Contrast Stroke Protocol [[id number]] Collected: 11/26/20 1552     Updated: 11/26/20 1558    Narrative:      EXAMINATION: CT HEAD WO CONTRAST STROKE PROTOCOL-      INDICATION: Stroke, follow up     TECHNIQUE: Axial noncontrast CT of the head with multiplanar  reconstructions     The radiation dose reduction device was turned on for each scan per the  ALARA (As Low as Reasonably Achievable) protocol.     COMPARISON: NONE     FINDINGS: No evidence of acute hemorrhage, mass or mass effect.  Hypoattenuation involving the right centrum semiovale, age indeterminate  lacunar infarct. Mild hypoattenuation of the periventricular white  matter compatible with chronic small vessel ischemic change. The orbits  are normal and the paranasal sinuses are grossly clear. The calvarium is  intact.       Impression:      No acute intracranial abnormality. Age-related changes of  the brain including likely chronic right frontal lobe lacunar infarct.     Findings relayed to the stroke team via the stroke technologist by  Neftali Mccall at 3:52 PM 11/26/2020. Scan performed 11/26/2020 3:41  PM        This report was finalized on 11/26/2020 3:55 PM by Neftali Mccall.             Results for orders placed during the hospital encounter of 11/26/20   Duplex Renal Artery - Bilateral Complete CAR    Narrative EXAMINATION: DUPLEX RENAL ARTERY BILATERAL COMPLETE CAR- 11/27/2020     INDICATION: renovascular hypertension     COMPARISON: NONE     FINDINGS: Kidneys are symmetric and within normal limits in size,  approximately 12 cm in length on the right, and just under 13 cm in  length on the left. Peak  systolic renal artery velocities are within  normal limits bilaterally, up to 172 cm/s on the right and 178 cm/s on  the left. Renal aortic ratios are within normal limits, calculated as  1.9 on the right and 2.0 on the left. Resistive indices are within  normal limits, calculated as 0.74 for the right kidney and 0.65 for the  left kidney. Both renal veins are visible and patent.       Impression Negative duplex renal ultrasound. No evidence of  hemodynamically significant right or left renal artery stenosis.     D:  12/01/2020  E:  12/01/2020        This report was finalized on 12/1/2020 10:29 PM by Dr. Frank Hinson MD.          Results for orders placed during the hospital encounter of 11/26/20   Duplex Renal Artery - Bilateral Complete CAR    Narrative EXAMINATION: DUPLEX RENAL ARTERY BILATERAL COMPLETE CAR- 11/27/2020     INDICATION: renovascular hypertension     COMPARISON: NONE     FINDINGS: Kidneys are symmetric and within normal limits in size,  approximately 12 cm in length on the right, and just under 13 cm in  length on the left. Peak systolic renal artery velocities are within  normal limits bilaterally, up to 172 cm/s on the right and 178 cm/s on  the left. Renal aortic ratios are within normal limits, calculated as  1.9 on the right and 2.0 on the left. Resistive indices are within  normal limits, calculated as 0.74 for the right kidney and 0.65 for the  left kidney. Both renal veins are visible and patent.       Impression Negative duplex renal ultrasound. No evidence of  hemodynamically significant right or left renal artery stenosis.     D:  12/01/2020  E:  12/01/2020        This report was finalized on 12/1/2020 10:29 PM by Dr. Frank Hinson MD.          Results for orders placed during the hospital encounter of 11/26/20   Adult Transthoracic Echo Complete W/ Cont if Necessary Per Protocol (With Agitated Saline)    Narrative · Left ventricular systolic function is normal. Estimated left ventricular   EF  = 65%  · Left ventricular wall thickness is consistent with concentric   hypertrophy.  · The cardiac valves are anatomically and functionally normal.  · Saline test results are negative.          Plan for Follow-up of Pending Labs/Results:   Pending Labs     Order Current Status    Renin Direct Assay In process        Discharge Details        Discharge Medications      New Medications      Instructions Start Date   aspirin 81 MG chewable tablet   81 mg, Oral, Daily   Start Date: December 4, 2020     carvedilol 6.25 MG tablet  Commonly known as: COREG   6.25 mg, Oral, 2 Times Daily With Meals      clopidogrel 75 MG tablet  Commonly known as: PLAVIX   75 mg, Oral, Daily   Start Date: December 4, 2020     docusate sodium 100 MG capsule   100 mg, Oral, 2 Times Daily      famotidine 20 MG tablet  Commonly known as: PEPCID   20 mg, Oral, 2 Times Daily Before Meals      magnesium oxide 400 (241.3 Mg) MG tablet tablet  Commonly known as: MAGOX   400 mg, Oral, 2 Times Daily      melatonin 5 MG tablet tablet   5 mg, Oral, Nightly PRN      polyethylene glycol 17 g packet  Commonly known as: MIRALAX   17 g, Oral, 2 times daily      terazosin 5 MG capsule  Commonly known as: HYTRIN   5 mg, Oral, Nightly         Changes to Medications      Instructions Start Date   atorvastatin 80 MG tablet  Commonly known as: Lipitor  What changed:   · medication strength  · how much to take   80 mg, Oral, Nightly      venlafaxine XR 75 MG 24 hr capsule  Commonly known as: EFFEXOR-XR  What changed:   · medication strength  · how much to take   225 mg, Oral, Daily         Continue These Medications      Instructions Start Date   dilTIAZem  MG 24 hr capsule  Commonly known as: CARDIZEM CD   300 mg, Oral, Daily      glipizide 10 MG 24 hr tablet  Commonly known as: GLUCOTROL XL   10 mg, Oral, 2 times daily      glucose blood test strip   Check BS BID      glucose monitor monitoring kit   Use BID to check BS      Lancets misc   Check BS BID       losartan 100 MG tablet  Commonly known as: COZAAR   100 mg, Oral, Daily      metFORMIN 1000 MG tablet  Commonly known as: GLUCOPHAGE   1,000 mg, Oral, 2 Times Daily With Meals      Ozempic (0.25 or 0.5 MG/DOSE) 2 MG/1.5ML solution pen-injector  Generic drug: Semaglutide(0.25 or 0.5MG/DOS)   Start with 0.25mg sc weekly x 4 weeks then increase to 0.5mg weekly             No Known Allergies      Discharge Disposition:  Rehab Facility or Unit (DC - External)    Diet:  Hospital:  Diet Order   Procedures   • Diet Regular; Consistent Carbohydrate       Activity:  Activity Instructions     Activity as Tolerated      Measure Blood Pressure            Restrictions or Other Recommendations:         CODE STATUS:    Code Status and Medical Interventions:   Ordered at: 11/26/20 0876     Level Of Support Discussed With:    Patient     Code Status:    CPR     Medical Interventions (Level of Support Prior to Arrest):    Full       Future Appointments   Date Time Provider Department Center   12/30/2020 11:00 AM CLASSROOM 1 [facility]   2/9/2021  9:30 AM Addie Mckeon PA-C [facility] None       Additional Instructions for the Follow-ups that You Need to Schedule     Ambulatory Referral to [facility]   As directed      Patient will need a 30 day event monitor placed to monitor for arrhythmia for post stroke follow up. We will not follow up as outpatient. Was ordered by Neurology.    Order Comments: Patient will need a 30 day event monitor placed to monitor for arrhythmia for post stroke follow up. We will not follow up as outpatient. Was ordered by Neurology.     Service Requested: Cardiac Monitor         Discharge Follow-up with PCP   As directed       Currently Documented PCP:    Angelica Adame PA-C    PCP Phone Number:    [phone]     Follow Up Details: follow up with PCP after dc from rehab         Discharge Follow-up with Specified Provider: Follow up with Neurology 4 weeks   As  directed      To: Follow up with Neurology 4 weeks                     CHANDNI Roth  12/03/20      Time Spent on Discharge:  I spent  35  minutes on this discharge activity which included: face-to-face encounter with the patient, reviewing the data in the system, coordination of the care with the nursing staff as well as consultants, documentation, and entering orders.            Electronically signed by Neena Stephens APRN at 12/03/20 1017

## 2020-12-03 NOTE — PROGRESS NOTES
Case Management Discharge Note      Final Note: Plan is [facility] CVA 1. Daughter will transport. Nurse to call report to [phone] and fax discharge summary to [phone]. Please send any hard scripts for medications with the patient in transfer packet.         Selected Continued Care - Admitted Since 11/26/2020     Destination Coordination complete    Service Provider Selected Services Address Phone Fax Patient Preferred    [facility]  Inpatient Rehabilitation [address] [address] [phone] --          Durable Medical Equipment    No services have been selected for the patient.              Dialysis/Infusion    No services have been selected for the patient.              Home Medical Care    No services have been selected for the patient.              Therapy    No services have been selected for the patient.              Community Resources    No services have been selected for the patient.                       Final Discharge Disposition Code: 62 - inpatient rehab facility

## 2020-12-03 NOTE — DISCHARGE SUMMARY
[facility] Medicine Services  DISCHARGE SUMMARY    Patient Name: Rosanne Araya  : 1965  MRN: [id number]    Date of Admission: 2020  3:35 PM  Date of Discharge:  12/3/2020  Primary Care Physician: Angelica Adame PA-C    Consults     Date and Time Order Name Status Description    2020 1540 Inpatient Neurology Consult Stroke Completed           Hospital Course     Presenting Problem:   Acute CVA (cerebrovascular accident) (CMS/HCC) [I63.9]  Acute CVA (cerebrovascular accident) (CMS/HCC) [I63.9]    Active Hospital Problems    Diagnosis  POA   • **Acute CVA (cerebrovascular accident) (CMS/[facility]) [I63.9]  Yes   • Nausea [R11.0]  Yes   • Hypokalemia [E87.6]  Yes   • Uncontrolled type 2 diabetes mellitus with hyperglycemia (CMS/[facility]) [E11.65]  Yes   • Essential hypertension [I10]  Yes      Resolved Hospital Problems   No resolved problems to display.          Hospital Course:  Rosanne Araya is a 55 y.o. female with PMHx of DM2 and HTN who presented to the ED with decreased vision on her left side as well as trouble with coordination on the left side as well.       Acute ischemic stroke, right Posterior cerebral artery likely, etiology unknown  -Stroke Neuro has signed off. Follow up outpatient  -Hypercoagulable workup with normal Factor 5 Leiden, Normal Homocysteine, DAVIDE neg  -TTE with normal EF, saline test negative. TCD negative bubble  -Plan for [facility] outpatient   -Pt will need event monitor at [facility] referral to heart and valve clinic as outpatient to have monitor placed after discharge from rehab   -DAPT for 90 days followed by ASA monotherapy- discussed with patient   - [facility]     HTN- bp stable, no change today  -- Continue Cardizem and Losartan  -- Continue Coreg 6.25mg BID  -- Increase Hytrin to 5mg   -- Renal US negative for BALTAZAR     T2DM  --A1C 8.4  --uses orals at home and recently started on once weekly injection that causes her to be nauseated  for days (last taken 11/23/20)  --slightly increase levemir and prandial. Continue ssi  -- will resume home regimen at discharge. Discussed with patient today who will follow up with PCP regarding further management     Nausea  --pt attributes to ozempic  --RESOLVED      Hypokalemia  --Resolved     Hypomagnesemia  --replace prn     Depression  -Per patients , she has dealt with a lot of depression  -Asks about mental health services/counselor. SW has discussed with patient  -Continue increased dose of Effexor, 225mg daily      Constipation  -changed miralax to bid    Patient has remained clinically stable and will be discharged to rehab today.       Discharge Follow Up Recommendations for outpatient labs/diagnostics:   follow up with PCP after dc from rehab   Follow up with Neurology 4 weeks     Day of Discharge     HPI:   Patient is resting in bed in NAD. She slept well. Tolerating diet. No acute events overnight per nursing. Plan for rehab today.     Review of Systems  Gen- No fevers, chills  CV- No chest pain, palpitations  Resp- No cough, dyspnea  GI- No N/V/D, abd pain        Vital Signs:   Temp:  [97.6 °F (36.4 °C)-98.5 °F (36.9 °C)] 97.6 °F (36.4 °C)  Heart Rate:  [71-90] 75  Resp:  [16-18] 18  BP: (135-171)/(79-91) 156/91     Physical Exam:  Constitutional: No acute distress, awake, alert  HENT: NCAT, mucous membranes moist  Respiratory: Clear to auscultation bilaterally, respiratory effort normal room air   Cardiovascular: RRR, no murmurs, rubs, or gallops, palpable pedal pulses bilaterally  Gastrointestinal: Positive bowel sounds, soft, nontender, nondistended  Musculoskeletal: No bilateral ankle edema  Psychiatric: Appropriate affect, cooperative  Neurologic: Oriented x 3, strength symmetric in all extremities, Cranial Nerves grossly intact to confrontation, speech clear  Skin: No rashes      Pertinent  and/or Most Recent Results     Results from last 7 days   Lab Units 11/28/20  0722  11/27/20  2210 11/27/20  1103 11/27/20  0553 11/26/20  1551   WBC 10*3/mm3 11.68*  --   --   --  10.93*   HEMOGLOBIN g/dL 14.1  --   --   --  14.3   HEMATOCRIT % 41.2  --   --   --  41.3   PLATELETS 10*3/mm3 304  --   --   --  358   SODIUM mmol/L 141  --   --  142 139   POTASSIUM mmol/L 3.9 3.3* 3.0* 2.5* 3.2*   CHLORIDE mmol/L 104  --   --  99 97*   CO2 mmol/L 28.0  --   --  30.0* 29.0   BUN mg/dL 8  --   --  7 13   CREATININE mg/dL 0.60  --   --  0.65 0.82   GLUCOSE mg/dL 177*  --   --  83 124*   CALCIUM mg/dL 9.0  --   --  8.9 9.8     Results from last 7 days   Lab Units 11/26/20  1551 11/26/20  1550   BILIRUBIN mg/dL 0.6  --    ALK PHOS U/L 88  --    ALT (SGPT) U/L 8  <5  --    AST (SGOT) U/L 19  20  --    PROTIME Seconds 13.1 14.5   INR  1.02 1.2   APTT seconds 30.2  --      Results from last 7 days   Lab Units 11/27/20  0553   CHOLESTEROL mg/dL 122   TRIGLYCERIDES mg/dL 134   HDL CHOL mg/dL 29*   LDL CHOL mg/dL 69     Results from last 7 days   Lab Units 11/27/20  0553 11/26/20  1551   HEMOGLOBIN A1C % 8.40*  --    TROPONIN T ng/mL  --  <0.010       Brief Urine Lab Results  (Last result in the past 365 days)      Color   Clarity   Blood   Leuk Est   Nitrite   Protein   CREAT   Urine HCG        08/05/20 1135             200             Microbiology Results Abnormal     Procedure Component Value - Date/Time    COVID PRE-OP / PRE-PROCEDURE SCREENING ORDER (NO ISOLATION) - Swab, Nasopharynx [[id number]]  (Normal) Collected: 11/26/20 1657    Lab Status: Final result Specimen: Swab from Nasopharynx Updated: 11/26/20 1840    Narrative:      The following orders were created for panel order COVID PRE-OP / PRE-PROCEDURE SCREENING ORDER (NO ISOLATION) - Swab, Nasopharynx.  Procedure                               Abnormality         Status                     ---------                               -----------         ------                     Respiratory Panel PCR w/...[[id number]]  Normal              Final result                  Please view results for these tests on the individual orders.    Respiratory Panel PCR w/COVID-19(SARS-CoV-2) PAVEL/[facility]AN[facility]/MYRA/PAD/COR/MAD/TAVO In-House, NP Swab in UTM/VTM, 3-4 HR TAT - Swab, Nasopharynx [[id number]]  (Normal) Collected: 11/26/20 1657    Lab Status: Final result Specimen: Swab from Nasopharynx Updated: 11/26/20 1840     ADENOVIRUS, PCR Not Detected     Coronavirus 229E Not Detected     Coronavirus HKU1 Not Detected     Coronavirus NL63 Not Detected     Coronavirus OC43 Not Detected     COVID19 Not Detected     Human Metapneumovirus Not Detected     Human Rhinovirus/Enterovirus Not Detected     Influenza A PCR Not Detected     Influenza A H1 Not Detected     Influenza A H1 2009 PCR Not Detected     Influenza A H3 Not Detected     Influenza B PCR Not Detected     Parainfluenza Virus 1 Not Detected     Parainfluenza Virus 2 Not Detected     Parainfluenza Virus 3 Not Detected     Parainfluenza Virus 4 Not Detected     RSV, PCR Not Detected     Bordetella pertussis pcr Not Detected     Bordetella parapertussis PCR Not Detected     Chlamydophila pneumoniae PCR Not Detected     Mycoplasma pneumo by PCR Not Detected    Narrative:      Fact sheet for providers: https://docs.YesVideo/wp-content/uploads/TAT0353-9170-UT1.1-EUA-Provider-Fact-Sheet-3.pdf    Fact sheet for patients: https://docs.YesVideo/wp-content/uploads/DLU5168-9241-HH1.1-EUA-Patient-Fact-Sheet-1.pdf          Imaging Results (All)     Procedure Component Value Units Date/Time    CT Angiogram Neck [[id number]] Collected: 11/26/20 1616     Updated: 11/27/20 2149    Narrative:      EXAMINATION: CT ANGIOGRAM NECK-11/26/2020:      INDICATION: Stroke, follow-up.     TECHNIQUE: Spiral acquisition 3 mm and 0.75 mm axial images through the  neck with sagittal and coronal 10 mm 2-D reconstructions. 3-D VRT and  MIP angiographic reconstructions.     The radiation dose reduction device was turned on for each scan per the  ALARA (As Low as  Reasonably Achievable) protocol.     COMPARISON: NONE.     FINDINGS: On the right, no significant common, internal, or external  carotid artery stenosis is seen to the level of the skull base.     On the left, visualization of the proximal common carotid artery is  mildly limited due to dense contrast in the adjacent brachycephalic vein  but no stenosis is suspected. The remainder of the left common carotid  artery shows no evidence of significant stenosis. There is mild  circumferential noncalcified thrombus of the carotid bulb and left ICA  without evidence of hemodynamically significant stenosis. What appears  to be a small ulcerated plaque of the carotid bulb is actually  calcification within thrombus, with reference to the axial unenhanced  images, specifically unenhanced image 37 of series 7 compared to axial 3  mm image 301 of series 902. No hemodynamically significant ECA stenosis  or ICA stenosis is seen to the level of the skull base.     Vertebral arteries as is typical are seen in a limited fashion  proximally. Elsewhere, they appear codominant and grossly normal to the  level of the foramen magnum.     No incidental pathologic soft tissue neck pathology is appreciated.       Impression:      No evidence of hemodynamically significant carotid or  vertebral artery stenosis in the neck.     D:  11/26/2020  E:  11/27/2020     This report was finalized on 11/27/2020 9:46 PM by Dr. Frank Hinson MD.       CT Cerebral Perfusion With & Without Contrast [[id number]] Collected: 11/26/20 1610     Updated: 11/27/20 2148    Narrative:      EXAMINATION: CT CEREBRAL PERFUSION W WO CONTRAST-  11/26/2020     INDICATION: Neuro deficit, acute, stroke suspected     TECHNIQUE: Cerebral perfusion analysis was performed using computed  tomography with contrast administration including postprocessing of  parametric maps with determination of cerebral blood flow, cerebral  blood volume, mean transit time and time to drain.     The  radiation dose reduction device was turned on for each scan per the  ALARA (As Low as Reasonably Achievable) protocol.     COMPARISON: Unenhanced head CT scan of same date     FINDINGS: Rapid analysis shows no areas of the brain with cerebral blood  flow less than 30%. There appear to be a couple of patchy areas of  potential ischemic tissue, totaling 15 mL in the right temporal  occipital region, where T MAX is greater than six seconds. The  individual perfusion maps show a more well-defined, confluent  abnormality in expected location of the right posterior cerebral artery  territory, with significantly increased time to drain, T MAX, and mean  transit time. This covers a larger area than typically associated with  posterior cerebral artery, perhaps variant vascular anatomy. Cerebral  blood flow is decreased to a lesser extent. Cerebral blood volume  appears within expected limits, best to slightly increased in the  occipital region. No significant asymmetry is appreciated elsewhere.       Impression:      Findings consistent with focal right PCA territory ischemia,  and larger area of slow flow. No apparent core infarct.     D:  11/26/2020  E:  11/27/2020     This report was finalized on 11/27/2020 9:45 PM by Dr. Frank Hinson MD.       CT Angiogram Head [[id number]] Collected: 11/26/20 1623     Updated: 11/27/20 2147    Narrative:      EXAMINATION: CT ANGIOGRAM HEAD-  11/26/2020     INDICATION: Stroke, follow up     TECHNIQUE: 3 mm and 0.75 mm pre and postcontrast images through the  brain with multiplanar 2-D reconstructions and 3-D VRT and MIP  angiographic reconstruction.     The radiation dose reduction device was turned on for each scan per the  ALARA (As Low as Reasonably Achievable) protocol.     COMPARISON: Cerebral perfusion exam of same date     FINDINGS: Intracranial vertebral arteries are relatively small in  caliber. Basilar artery is a small to medium size vessel. There appears  to be a prominent  left-sided posterior communicating artery. Left  posterior cerebral artery appears grossly normal. Right posterior  cerebral artery appears truncated, as can best be appreciated on axial  thin section image 305, series 5 and adjacent images, also corresponding  sagittal MIP image 25 series 903.     Regarding anterior circulation, the petrous portions of the right and  left internal arteries appear grossly normal. There is less than 50%  narrowing of the right cavernous carotid, and potentially greater than  50% narrowing of the left cavernous carotid, as seen on axial thin  section images 284 through 287 series 5. There appears to be focal mural  thrombus in the right ICA, axial image 291 series 5. Beyond this level,  supraclinoid right and left ICA appear normal. No significant anterior  or middle cerebral artery stenosis is identified.       Impression:      1. Truncated appearance of the proximal right posterior cerebral artery  consistent with patient's perfusion scan abnormality.  2. Potentially 50% or greater distal left cavernous carotid artery  stenosis.     D:  11/26/2020  E:  11/27/2020        This report was finalized on 11/27/2020 9:44 PM by Dr. Frank Hinson MD.       XR Chest 1 View [[id number]] Collected: 11/26/20 1629     Updated: 11/27/20 2144    Narrative:      EXAMINATION: XR CHEST 1 VW-11/26/2020:     INDICATION: Acute Stroke Protocol (onset < 12 hrs).     COMPARISON: NONE.     FINDINGS: The heart shadow is in the upper range of normal size. The  vasculature appears normal. The lungs appear well expanded and clear.       Impression:      No evidence of active chest disease.     D:  11/26/2020  E:  11/27/2020            This report was finalized on 11/27/2020 9:41 PM by Dr. Frank Hinson MD.       MRI Brain Without Contrast [[id number]] Collected: 11/26/20 1912     Updated: 11/26/20 1914    Narrative:      MRI Brain WO    INDICATION:   Numbness and weakness on left side and blurred vision since 11:00 AM  yesterday    TECHNIQUE:   MRI of the brain without IV contrast.    COMPARISON:    CTA head and neck with CT perfusion study and CT head done earlier today    FINDINGS:  The pituitary gland and foramen magnum region have a normal appearance. The ventricles and subarachnoid spaces are normal. The diffusion series is normal. In particular there is no evidence of posterior cerebral territory ischemic change. The FLAIR  series shows a few scattered bright foci in the deep and subcortical white matter consistent with small vessel ischemic changes.      Impression:      Scattered small old small vessel ischemic changes.     No evidence of recent ischemia    Signer Name: Joe Greenberg MD   Signed: 11/26/2020 7:12 PM   [facility]    [facility]    CT Head Without Contrast Stroke Protocol [[id number]] Collected: 11/26/20 1552     Updated: 11/26/20 1558    Narrative:      EXAMINATION: CT HEAD WO CONTRAST STROKE PROTOCOL-      INDICATION: Stroke, follow up     TECHNIQUE: Axial noncontrast CT of the head with multiplanar  reconstructions     The radiation dose reduction device was turned on for each scan per the  ALARA (As Low as Reasonably Achievable) protocol.     COMPARISON: NONE     FINDINGS: No evidence of acute hemorrhage, mass or mass effect.  Hypoattenuation involving the right centrum semiovale, age indeterminate  lacunar infarct. Mild hypoattenuation of the periventricular white  matter compatible with chronic small vessel ischemic change. The orbits  are normal and the paranasal sinuses are grossly clear. The calvarium is  intact.       Impression:      No acute intracranial abnormality. Age-related changes of  the brain including likely chronic right frontal lobe lacunar infarct.     Findings relayed to the stroke team via the stroke technologist by  Neftali Mccall at 3:52 PM 11/26/2020. Scan performed 11/26/2020 3:41  PM        This report was finalized on 11/26/2020 3:55 PM  by Neftali Mccall.             Results for orders placed during the hospital encounter of 11/26/20   Duplex Renal Artery - Bilateral Complete CAR    Narrative EXAMINATION: DUPLEX RENAL ARTERY BILATERAL COMPLETE CAR- 11/27/2020     INDICATION: renovascular hypertension     COMPARISON: NONE     FINDINGS: Kidneys are symmetric and within normal limits in size,  approximately 12 cm in length on the right, and just under 13 cm in  length on the left. Peak systolic renal artery velocities are within  normal limits bilaterally, up to 172 cm/s on the right and 178 cm/s on  the left. Renal aortic ratios are within normal limits, calculated as  1.9 on the right and 2.0 on the left. Resistive indices are within  normal limits, calculated as 0.74 for the right kidney and 0.65 for the  left kidney. Both renal veins are visible and patent.       Impression Negative duplex renal ultrasound. No evidence of  hemodynamically significant right or left renal artery stenosis.     D:  12/01/2020  E:  12/01/2020        This report was finalized on 12/1/2020 10:29 PM by Dr. Frank Hinson MD.          Results for orders placed during the hospital encounter of 11/26/20   Duplex Renal Artery - Bilateral Complete CAR    Narrative EXAMINATION: DUPLEX RENAL ARTERY BILATERAL COMPLETE CAR- 11/27/2020     INDICATION: renovascular hypertension     COMPARISON: NONE     FINDINGS: Kidneys are symmetric and within normal limits in size,  approximately 12 cm in length on the right, and just under 13 cm in  length on the left. Peak systolic renal artery velocities are within  normal limits bilaterally, up to 172 cm/s on the right and 178 cm/s on  the left. Renal aortic ratios are within normal limits, calculated as  1.9 on the right and 2.0 on the left. Resistive indices are within  normal limits, calculated as 0.74 for the right kidney and 0.65 for the  left kidney. Both renal veins are visible and patent.       Impression Negative duplex renal  ultrasound. No evidence of  hemodynamically significant right or left renal artery stenosis.     D:  12/01/2020  E:  12/01/2020        This report was finalized on 12/1/2020 10:29 PM by Dr. Frank Hinson MD.          Results for orders placed during the hospital encounter of 11/26/20   Adult Transthoracic Echo Complete W/ Cont if Necessary Per Protocol (With Agitated Saline)    Narrative · Left ventricular systolic function is normal. Estimated left ventricular   EF = 65%  · Left ventricular wall thickness is consistent with concentric   hypertrophy.  · The cardiac valves are anatomically and functionally normal.  · Saline test results are negative.          Plan for Follow-up of Pending Labs/Results:   Pending Labs     Order Current Status    Renin Direct Assay In process        Discharge Details        Discharge Medications      New Medications      Instructions Start Date   aspirin 81 MG chewable tablet   81 mg, Oral, Daily   Start Date: December 4, 2020     carvedilol 6.25 MG tablet  Commonly known as: COREG   6.25 mg, Oral, 2 Times Daily With Meals      clopidogrel 75 MG tablet  Commonly known as: PLAVIX   75 mg, Oral, Daily   Start Date: December 4, 2020     docusate sodium 100 MG capsule   100 mg, Oral, 2 Times Daily      famotidine 20 MG tablet  Commonly known as: PEPCID   20 mg, Oral, 2 Times Daily Before Meals      magnesium oxide 400 (241.3 Mg) MG tablet tablet  Commonly known as: MAGOX   400 mg, Oral, 2 Times Daily      melatonin 5 MG tablet tablet   5 mg, Oral, Nightly PRN      polyethylene glycol 17 g packet  Commonly known as: MIRALAX   17 g, Oral, 2 times daily      terazosin 5 MG capsule  Commonly known as: HYTRIN   5 mg, Oral, Nightly         Changes to Medications      Instructions Start Date   atorvastatin 80 MG tablet  Commonly known as: Lipitor  What changed:   · medication strength  · how much to take   80 mg, Oral, Nightly      venlafaxine XR 75 MG 24 hr capsule  Commonly known as:  EFFEXOR-XR  What changed:   · medication strength  · how much to take   225 mg, Oral, Daily         Continue These Medications      Instructions Start Date   dilTIAZem  MG 24 hr capsule  Commonly known as: CARDIZEM CD   300 mg, Oral, Daily      glipizide 10 MG 24 hr tablet  Commonly known as: GLUCOTROL XL   10 mg, Oral, 2 times daily      glucose blood test strip   Check BS BID      glucose monitor monitoring kit   Use BID to check BS      Lancets misc   Check BS BID      losartan 100 MG tablet  Commonly known as: COZAAR   100 mg, Oral, Daily      metFORMIN 1000 MG tablet  Commonly known as: GLUCOPHAGE   1,000 mg, Oral, 2 Times Daily With Meals      Ozempic (0.25 or 0.5 MG/DOSE) 2 MG/1.5ML solution pen-injector  Generic drug: Semaglutide(0.25 or 0.5MG/DOS)   Start with 0.25mg sc weekly x 4 weeks then increase to 0.5mg weekly             No Known Allergies      Discharge Disposition:  Rehab Facility or Unit (DC - External)    Diet:  Hospital:  Diet Order   Procedures   • Diet Regular; Consistent Carbohydrate       Activity:  Activity Instructions     Activity as Tolerated      Measure Blood Pressure            Restrictions or Other Recommendations:         CODE STATUS:    Code Status and Medical Interventions:   Ordered at: 11/26/20 1636     Level Of Support Discussed With:    Patient     Code Status:    CPR     Medical Interventions (Level of Support Prior to Arrest):    Full       Future Appointments   Date Time Provider Department Center   12/30/2020 11:00 AM CL[facility]SROOM 1 [facility]   2/9/2021  9:30 AM Addie Mckeon PA-C [facility] None       Additional Instructions for the Follow-ups that You Need to Schedule     Ambulatory Referral to [facility] [place] - [facility]   As directed      Patient will need a 30 day event monitor placed to monitor for arrhythmia for post stroke follow up. We will not follow up as outpatient. Was ordered by Neurology.    Order Comments: Patient will need a 30  day event monitor placed to monitor for arrhythmia for post stroke follow up. We will not follow up as outpatient. Was ordered by Neurology.     Service Requested: Cardiac Monitor         Discharge Follow-up with PCP   As directed       Currently Documented PCP:    Angelica Adame PA-C    PCP Phone Number:    [phone]     Follow Up Details: follow up with PCP after dc from rehab         Discharge Follow-up with Specified Provider: Follow up with Neurology 4 weeks   As directed      To: Follow up with Neurology 4 weeks                     CHANDNI Roth  12/03/20      Time Spent on Discharge:  I spent  35  minutes on this discharge activity which included: face-to-face encounter with the patient, reviewing the data in the system, coordination of the care with the nursing staff as well as consultants, documentation, and entering orders.

## 2020-12-04 NOTE — PAYOR COMM NOTE
"Rosanne Rosas (55 y.o. Female)     Date of Birth Social Security Number Address Home Phone MRN    1965  [id number] [phone] [id number]    M[employer]on Marital Status          None        Admission Date Admission Type Admitting Provider Attending Provider Department, Room/Bed    20 Emergency Jam Brown MD  [facility] 3F, S312/1    Discharge Date Discharge Disposition Discharge Destination        12/3/2020 Rehab Facility or Unit (DC - External)              Attending Provider: (none)   Allergies: No Known Allergies    Isolation: None   Infection: None   Code Status: Prior    Ht: 162.6 cm (64\")   Wt: 77.6 kg (171 lb)    Admission Cmt: None   Principal Problem: Acute CVA (cerebrovascular accident) (CMS/[facility]) [I63.9]                 Active Insurance as of 2020     Primary Coverage     Payor Plan Insurance Group Employer/Plan Group    [facility] BLUE CROSS [id number]     Payor Plan Address Payor Plan Phone Number Payor Plan Fax Number Effective Dates    [address]   2020 - None Entered    [place] [address]       Subscriber Name Subscriber Birth Date Member ID       ROSANNE ROSAS 1965 [id number]                 Emergency Contacts      (Rel.) Home Phone Work Phone Mobile Phone    Deangelo Rsoas (Spouse) -- -- [phone]               Discharge Summary      Neena Stephens APRN at 20 0854     Attestation signed by Jam Brown MD at 20 1926    Patient care provided by sher Schultz. I was available for questions                      [facility] Medicine Services  DISCHARGE SUMMARY    Patient Name: Rosanne Rosas  : 1965  MRN: [id number]    Date of Admission: 2020  3:35 PM  Date of Discharge:  12/3/2020  Primary Care Physician: Angelica Adame PA-C    Consults     Date and Time Order Name Status Description    2020 " 1540 Inpatient Neurology Consult Stroke Completed           Hospital Course     Presenting Problem:   Acute CVA (cerebrovascular accident) (CMS/[facility]) [I63.9]  Acute CVA (cerebrovascular accident) (CMS/[facility]) [I63.9]    Active Hospital Problems    Diagnosis  POA   • **Acute CVA (cerebrovascular accident) (CMS/[facility]) [I63.9]  Yes   • Nausea [R11.0]  Yes   • Hypokalemia [E87.6]  Yes   • Uncontrolled type 2 diabetes mellitus with hyperglycemia (CMS/[facility]) [E11.65]  Yes   • Essential hypertension [I10]  Yes      Resolved Hospital Problems   No resolved problems to display.          Hospital Course:  Rosanne Araya is a 55 y.o. female with PMHx of DM2 and HTN who presented to the ED with decreased vision on her left side as well as trouble with coordination on the left side as well.       Acute ischemic stroke, right Posterior cerebral artery likely, etiology unknown  -Stroke Neuro has signed off. Follow up outpatient  -Hypercoagulable workup with normal Factor 5 Leiden, Normal Homocysteine, DAVIDE neg  -TTE with normal EF, saline test negative. TCD negative bubble  -Plan for [facility] outpatient   -Pt will need event monitor at [place] referral to heart and valve clinic as outpatient to have monitor placed after discharge from rehab   -DAPT for 90 days followed by ASA monotherapy- discussed with patient   - [facility] [place]-     HTN- bp stable, no change today  -- Continue Cardizem and Losartan  -- Continue Coreg 6.25mg BID  -- Increase Hytrin to 5mg   -- Renal US negative for BALTAZAR     T2DM  --A1C 8.4  --uses orals at home and recently started on once weekly injection that causes her to be nauseated for days (last taken 11/23/20)  --slightly increase levemir and prandial. Continue ssi  -- will resume home regimen at discharge. Discussed with patient today who will follow up with PCP regarding further management     Nausea  --pt attributes to ozempic  --RESOLVED      Hypokalemia  --Resolved     Hypomagnesemia  --replace  prn     Depression  -Per patients , she has dealt with a lot of depression  -Asks about mental health services/counselor. SW has discussed with patient  -Continue increased dose of Effexor, 225mg daily      Constipation  -changed miralax to bid    Patient has remained clinically stable and will be discharged to rehab today.       Discharge Follow Up Recommendations for outpatient labs/diagnostics:   follow up with PCP after dc from rehab   Follow up with Neurology 4 weeks     Day of Discharge     HPI:   Patient is resting in bed in NAD. She slept well. Tolerating diet. No acute events overnight per nursing. Plan for rehab today.     Review of Systems  Gen- No fevers, chills  CV- No chest pain, palpitations  Resp- No cough, dyspnea  GI- No N/V/D, abd pain        Vital Signs:   Temp:  [97.6 °F (36.4 °C)-98.5 °F (36.9 °C)] 97.6 °F (36.4 °C)  Heart Rate:  [71-90] 75  Resp:  [16-18] 18  BP: (135-171)/(79-91) 156/91     Physical Exam:  Constitutional: No acute distress, awake, alert  HENT: NCAT, mucous membranes moist  Respiratory: Clear to auscultation bilaterally, respiratory effort normal room air   Cardiovascular: RRR, no murmurs, rubs, or gallops, palpable pedal pulses bilaterally  Gastrointestinal: Positive bowel sounds, soft, nontender, nondistended  Musculoskeletal: No bilateral ankle edema  Psychiatric: Appropriate affect, cooperative  Neurologic: Oriented x 3, strength symmetric in all extremities, Cranial Nerves grossly intact to confrontation, speech clear  Skin: No rashes      Pertinent  and/or Most Recent Results     Results from last 7 days   Lab Units 11/28/20  0528 11/27/20  2210 11/27/20  1103 11/27/20  0553 11/26/20  1551   WBC 10*3/mm3 11.68*  --   --   --  10.93*   HEMOGLOBIN g/dL 14.1  --   --   --  14.3   HEMATOCRIT % 41.2  --   --   --  41.3   PLATELETS 10*3/mm3 304  --   --   --  358   SODIUM mmol/L 141  --   --  142 139   POTASSIUM mmol/L 3.9 3.3* 3.0* 2.5* 3.2*   CHLORIDE mmol/L 104  --    --  99 97*   CO2 mmol/L 28.0  --   --  30.0* 29.0   BUN mg/dL 8  --   --  7 13   CREATININE mg/dL 0.60  --   --  0.65 0.82   GLUCOSE mg/dL 177*  --   --  83 124*   CALCIUM mg/dL 9.0  --   --  8.9 9.8     Results from last 7 days   Lab Units 11/26/20  1551 11/26/20  1550   BILIRUBIN mg/dL 0.6  --    ALK PHOS U/L 88  --    ALT (SGPT) U/L 8  <5  --    AST (SGOT) U/L 19  20  --    PROTIME Seconds 13.1 14.5   INR  1.02 1.2   APTT seconds 30.2  --      Results from last 7 days   Lab Units 11/27/20  0553   CHOLESTEROL mg/dL 122   TRIGLYCERIDES mg/dL 134   HDL CHOL mg/dL 29*   LDL CHOL mg/dL 69     Results from last 7 days   Lab Units 11/27/20  0553 11/26/20  1551   HEMOGLOBIN A1C % 8.40*  --    TROPONIN T ng/mL  --  <0.010       Brief Urine Lab Results  (Last result in the past 365 days)      Color   Clarity   Blood   Leuk Est   Nitrite   Protein   CREAT   Urine HCG        08/05/20 1135             200             Microbiology Results Abnormal     Procedure Component Value - Date/Time    COVID PRE-OP / PRE-PROCEDURE SCREENING ORDER (NO ISOLATION) - Swab, Nasopharynx [[id number]]  (Normal) Collected: 11/26/20 1657    Lab Status: Final result Specimen: Swab from Nasopharynx Updated: 11/26/20 1840    Narrative:      The following orders were created for panel order COVID PRE-OP / PRE-PROCEDURE SCREENING ORDER (NO ISOLATION) - Swab, Nasopharynx.  Procedure                               Abnormality         Status                     ---------                               -----------         ------                     Respiratory Panel PCR w/...[[id number]]  Normal              Final result                 Please view results for these tests on the individual orders.    Respiratory Panel PCR w/COVID-19(SARS-CoV-2) PAVEL/JEANINE/MYRA/PAD/COR/MAD/TAVO In-House, NP Swab in [facility], 3-4 HR TAT - Swab, Nasopharynx [[id number]]  (Normal) Collected: 11/26/20 1657    Lab Status: Final result Specimen: Swab from Nasopharynx Updated: 11/26/20  1840     ADENOVIRUS, PCR Not Detected     Coronavirus 229E Not Detected     Coronavirus HKU1 Not Detected     Coronavirus NL63 Not Detected     Coronavirus OC43 Not Detected     COVID19 Not Detected     Human Metapneumovirus Not Detected     Human Rhinovirus/Enterovirus Not Detected     Influenza A PCR Not Detected     Influenza A H1 Not Detected     Influenza A H1 2009 PCR Not Detected     Influenza A H3 Not Detected     Influenza B PCR Not Detected     Parainfluenza Virus 1 Not Detected     Parainfluenza Virus 2 Not Detected     Parainfluenza Virus 3 Not Detected     Parainfluenza Virus 4 Not Detected     RSV, PCR Not Detected     Bordetella pertussis pcr Not Detected     Bordetella parapertussis PCR Not Detected     Chlamydophila pneumoniae PCR Not Detected     Mycoplasma pneumo by PCR Not Detected    Narrative:      Fact sheet for providers: https://docs.ensembli/wp-content/uploads/SUD7699-0168-QA8.1-EUA-Provider-Fact-Sheet-3.pdf    Fact sheet for patients: https://docs.ensembli/wp-content/uploads/DHT4054-1018-VT3.1-EUA-Patient-Fact-Sheet-1.pdf          Imaging Results (All)     Procedure Component Value Units Date/Time    CT Angiogram Neck [[id number]] Collected: 11/26/20 1616     Updated: 11/27/20 2149    Narrative:      EXAMINATION: CT ANGIOGRAM NECK-11/26/2020:      INDICATION: Stroke, follow-up.     TECHNIQUE: Spiral acquisition 3 mm and 0.75 mm axial images through the  neck with sagittal and coronal 10 mm 2-D reconstructions. 3-D VRT and  MIP angiographic reconstructions.     The radiation dose reduction device was turned on for each scan per the  ALARA (As Low as Reasonably Achievable) protocol.     COMPARISON: NONE.     FINDINGS: On the right, no significant common, internal, or external  carotid artery stenosis is seen to the level of the skull base.     On the left, visualization of the proximal common carotid artery is  mildly limited due to dense contrast in the adjacent brachycephalic  vein  but no stenosis is suspected. The remainder of the left common carotid  artery shows no evidence of significant stenosis. There is mild  circumferential noncalcified thrombus of the carotid bulb and left ICA  without evidence of hemodynamically significant stenosis. What appears  to be a small ulcerated plaque of the carotid bulb is actually  calcification within thrombus, with reference to the axial unenhanced  images, specifically unenhanced image 37 of series 7 compared to axial 3  mm image 301 of series 902. No hemodynamically significant ECA stenosis  or ICA stenosis is seen to the level of the skull base.     Vertebral arteries as is typical are seen in a limited fashion  proximally. Elsewhere, they appear codominant and grossly normal to the  level of the foramen magnum.     No incidental pathologic soft tissue neck pathology is appreciated.       Impression:      No evidence of hemodynamically significant carotid or  vertebral artery stenosis in the neck.     D:  11/26/2020  E:  11/27/2020     This report was finalized on 11/27/2020 9:46 PM by Dr. Frank Hinson MD.       CT Cerebral Perfusion With & Without Contrast [[id number]] Collected: 11/26/20 1610     Updated: 11/27/20 2148    Narrative:      EXAMINATION: CT CEREBRAL PERFUSION W WO CONTRAST-  11/26/2020     INDICATION: Neuro deficit, acute, stroke suspected     TECHNIQUE: Cerebral perfusion analysis was performed using computed  tomography with contrast administration including postprocessing of  parametric maps with determination of cerebral blood flow, cerebral  blood volume, mean transit time and time to drain.     The radiation dose reduction device was turned on for each scan per the  ALARA (As Low as Reasonably Achievable) protocol.     COMPARISON: Unenhanced head CT scan of same date     FINDINGS: Rapid analysis shows no areas of the brain with cerebral blood  flow less than 30%. There appear to be a couple of patchy areas of  potential  ischemic tissue, totaling 15 mL in the right temporal  occipital region, where T MAX is greater than six seconds. The  individual perfusion maps show a more well-defined, confluent  abnormality in expected location of the right posterior cerebral artery  territory, with significantly increased time to drain, T MAX, and mean  transit time. This covers a larger area than typically associated with  posterior cerebral artery, perhaps variant vascular anatomy. Cerebral  blood flow is decreased to a lesser extent. Cerebral blood volume  appears within expected limits, best to slightly increased in the  occipital region. No significant asymmetry is appreciated elsewhere.       Impression:      Findings consistent with focal right PCA territory ischemia,  and larger area of slow flow. No apparent core infarct.     D:  11/26/2020  E:  11/27/2020     This report was finalized on 11/27/2020 9:45 PM by Dr. Frank Hinson MD.       CT Angiogram Head [[id number]] Collected: 11/26/20 1623     Updated: 11/27/20 2147    Narrative:      EXAMINATION: CT ANGIOGRAM HEAD-  11/26/2020     INDICATION: Stroke, follow up     TECHNIQUE: 3 mm and 0.75 mm pre and postcontrast images through the  brain with multiplanar 2-D reconstructions and 3-D VRT and MIP  angiographic reconstruction.     The radiation dose reduction device was turned on for each scan per the  ALARA (As Low as Reasonably Achievable) protocol.     COMPARISON: Cerebral perfusion exam of same date     FINDINGS: Intracranial vertebral arteries are relatively small in  caliber. Basilar artery is a small to medium size vessel. There appears  to be a prominent left-sided posterior communicating artery. Left  posterior cerebral artery appears grossly normal. Right posterior  cerebral artery appears truncated, as can best be appreciated on axial  thin section image 305, series 5 and adjacent images, also corresponding  sagittal MIP image 25 series 903.     Regarding anterior circulation,  the petrous portions of the right and  left internal arteries appear grossly normal. There is less than 50%  narrowing of the right cavernous carotid, and potentially greater than  50% narrowing of the left cavernous carotid, as seen on axial thin  section images 284 through 287 series 5. There appears to be focal mural  thrombus in the right ICA, axial image 291 series 5. Beyond this level,  supraclinoid right and left ICA appear normal. No significant anterior  or middle cerebral artery stenosis is identified.       Impression:      1. Truncated appearance of the proximal right posterior cerebral artery  consistent with patient's perfusion scan abnormality.  2. Potentially 50% or greater distal left cavernous carotid artery  stenosis.     D:  11/26/2020  E:  11/27/2020        This report was finalized on 11/27/2020 9:44 PM by Dr. Frank Hinson MD.       XR Chest 1 View [[id number]] Collected: 11/26/20 1629     Updated: 11/27/20 2144    Narrative:      EXAMINATION: XR CHEST 1 VW-11/26/2020:     INDICATION: Acute Stroke Protocol (onset < 12 hrs).     COMPARISON: NONE.     FINDINGS: The heart shadow is in the upper range of normal size. The  vasculature appears normal. The lungs appear well expanded and clear.       Impression:      No evidence of active chest disease.     D:  11/26/2020  E:  11/27/2020            This report was finalized on 11/27/2020 9:41 PM by Dr. Frank Hinson MD.       MRI Brain Without Contrast [[id number]] Collected: 11/26/20 1912     Updated: 11/26/20 1914    Narrative:      MRI Brain WO    INDICATION:   Numbness and weakness on left side and blurred vision since 11:00 AM yesterday    TECHNIQUE:   MRI of the brain without IV contrast.    COMPARISON:    CTA head and neck with CT perfusion study and CT head done earlier today    FINDINGS:  The pituitary gland and foramen magnum region have a normal appearance. The ventricles and subarachnoid spaces are normal. The diffusion series is normal. In  particular there is no evidence of posterior cerebral territory ischemic change. The FLAIR  series shows a few scattered bright foci in the deep and subcortical white matter consistent with small vessel ischemic changes.      Impression:      Scattered small old small vessel ischemic changes.     No evidence of recent ischemia    Signer Name: Joe Greenberg MD   Signed: 11/26/2020 7:12 PM   Workstation Name: [email or web address]-    [facility]    CT Head Without Contrast Stroke Protocol [[id number]] Collected: 11/26/20 1552     Updated: 11/26/20 1558    Narrative:      EXAMINATION: CT HEAD WO CONTRAST STROKE PROTOCOL-      INDICATION: Stroke, follow up     TECHNIQUE: Axial noncontrast CT of the head with multiplanar  reconstructions     The radiation dose reduction device was turned on for each scan per the  ALARA (As Low as Reasonably Achievable) protocol.     COMPARISON: NONE     FINDINGS: No evidence of acute hemorrhage, mass or mass effect.  Hypoattenuation involving the right centrum semiovale, age indeterminate  lacunar infarct. Mild hypoattenuation of the periventricular white  matter compatible with chronic small vessel ischemic change. The orbits  are normal and the paranasal sinuses are grossly clear. The calvarium is  intact.       Impression:      No acute intracranial abnormality. Age-related changes of  the brain including likely chronic right frontal lobe lacunar infarct.     Findings relayed to the stroke team via the stroke technologist by  Neftali Mccall at 3:52 PM 11/26/2020. Scan performed 11/26/2020 3:41  PM        This report was finalized on 11/26/2020 3:55 PM by Neftali Mccall.             Results for orders placed during the hospital encounter of 11/26/20   Duplex Renal Artery - Bilateral Complete CAR    Narrative EXAMINATION: DUPLEX RENAL ARTERY BILATERAL COMPLETE CAR- 11/27/2020     INDICATION: renovascular hypertension     COMPARISON: NONE     FINDINGS: Kidneys are symmetric  and within normal limits in size,  approximately 12 cm in length on the right, and just under 13 cm in  length on the left. Peak systolic renal artery velocities are within  normal limits bilaterally, up to 172 cm/s on the right and 178 cm/s on  the left. Renal aortic ratios are within normal limits, calculated as  1.9 on the right and 2.0 on the left. Resistive indices are within  normal limits, calculated as 0.74 for the right kidney and 0.65 for the  left kidney. Both renal veins are visible and patent.       Impression Negative duplex renal ultrasound. No evidence of  hemodynamically significant right or left renal artery stenosis.     D:  12/01/2020  E:  12/01/2020        This report was finalized on 12/1/2020 10:29 PM by Dr. Frank Hinson MD.          Results for orders placed during the hospital encounter of 11/26/20   Duplex Renal Artery - Bilateral Complete CAR    Narrative EXAMINATION: DUPLEX RENAL ARTERY BILATERAL COMPLETE CAR- 11/27/2020     INDICATION: renovascular hypertension     COMPARISON: NONE     FINDINGS: Kidneys are symmetric and within normal limits in size,  approximately 12 cm in length on the right, and just under 13 cm in  length on the left. Peak systolic renal artery velocities are within  normal limits bilaterally, up to 172 cm/s on the right and 178 cm/s on  the left. Renal aortic ratios are within normal limits, calculated as  1.9 on the right and 2.0 on the left. Resistive indices are within  normal limits, calculated as 0.74 for the right kidney and 0.65 for the  left kidney. Both renal veins are visible and patent.       Impression Negative duplex renal ultrasound. No evidence of  hemodynamically significant right or left renal artery stenosis.     D:  12/01/2020  E:  12/01/2020        This report was finalized on 12/1/2020 10:29 PM by Dr. Frank Hinson MD.          Results for orders placed during the hospital encounter of 11/26/20   Adult Transthoracic Echo Complete W/ Cont if Necessary  Per Protocol (With Agitated Saline)    Narrative · Left ventricular systolic function is normal. Estimated left ventricular   EF = 65%  · Left ventricular wall thickness is consistent with concentric   hypertrophy.  · The cardiac valves are anatomically and functionally normal.  · Saline test results are negative.          Plan for Follow-up of Pending Labs/Results:   Pending Labs     Order Current Status    Renin Direct Assay In process        Discharge Details        Discharge Medications      New Medications      Instructions Start Date   aspirin 81 MG chewable tablet   81 mg, Oral, Daily   Start Date: December 4, 2020     carvedilol 6.25 MG tablet  Commonly known as: COREG   6.25 mg, Oral, 2 Times Daily With Meals      clopidogrel 75 MG tablet  Commonly known as: PLAVIX   75 mg, Oral, Daily   Start Date: December 4, 2020     docusate sodium 100 MG capsule   100 mg, Oral, 2 Times Daily      famotidine 20 MG tablet  Commonly known as: PEPCID   20 mg, Oral, 2 Times Daily Before Meals      magnesium oxide 400 (241.3 Mg) MG tablet tablet  Commonly known as: MAGOX   400 mg, Oral, 2 Times Daily      melatonin 5 MG tablet tablet   5 mg, Oral, Nightly PRN      polyethylene glycol 17 g packet  Commonly known as: MIRALAX   17 g, Oral, 2 times daily      terazosin 5 MG capsule  Commonly known as: HYTRIN   5 mg, Oral, Nightly         Changes to Medications      Instructions Start Date   atorvastatin 80 MG tablet  Commonly known as: Lipitor  What changed:   · medication strength  · how much to take   80 mg, Oral, Nightly      venlafaxine XR 75 MG 24 hr capsule  Commonly known as: EFFEXOR-XR  What changed:   · medication strength  · how much to take   225 mg, Oral, Daily         Continue These Medications      Instructions Start Date   dilTIAZem  MG 24 hr capsule  Commonly known as: CARDIZEM CD   300 mg, Oral, Daily      glipizide 10 MG 24 hr tablet  Commonly known as: GLUCOTROL XL   10 mg, Oral, 2 times daily       glucose blood test strip   Check BS BID      glucose monitor monitoring kit   Use BID to check BS      Lancets misc   Check BS BID      losartan 100 MG tablet  Commonly known as: COZAAR   100 mg, Oral, Daily      metFORMIN 1000 MG tablet  Commonly known as: GLUCOPHAGE   1,000 mg, Oral, 2 Times Daily With Meals      Ozempic (0.25 or 0.5 MG/DOSE) 2 MG/1.5ML solution pen-injector  Generic drug: Semaglutide(0.25 or 0.5MG/DOS)   Start with 0.25mg sc weekly x 4 weeks then increase to 0.5mg weekly             No Known Allergies      Discharge Disposition:  Rehab Facility or Unit (DC - External)    Diet:  Hospital:  Diet Order   Procedures   • Diet Regular; Consistent Carbohydrate       Activity:  Activity Instructions     Activity as Tolerated      Measure Blood Pressure            Restrictions or Other Recommendations:         CODE STATUS:    Code Status and Medical Interventions:   Ordered at: 11/26/20 1636     Level Of Support Discussed With:    Patient     Code Status:    CPR     Medical Interventions (Level of Support Prior to Arrest):    Full       Future Appointments   Date Time Provider Department Center   12/30/2020 11:00 AM CLASSROOM 1 [facility]   2/9/2021  9:30 AM Addie Mckeon PA-C [facility] None       Additional Instructions for the Follow-ups that You Need to Schedule     Ambulatory Referral to [facility] [place] [facility] [place]   As directed      Patient will need a 30 day event monitor placed to monitor for arrhythmia for post stroke follow up. We will not follow up as outpatient. Was ordered by Neurology.    Order Comments: Patient will need a 30 day event monitor placed to monitor for arrhythmia for post stroke follow up. We will not follow up as outpatient. Was ordered by Neurology.     Service Requested: Cardiac Monitor         Discharge Follow-up with PCP   As directed       Currently Documented PCP:    Angelica Adame PA-C    PCP Phone Number:    [phone]     Follow Up  Details: follow up with PCP after dc from rehab         Discharge Follow-up with Specified Provider: Follow up with Neurology 4 weeks   As directed      To: Follow up with Neurology 4 weeks                     CHANDNI Roth  12/03/20      Time Spent on Discharge:  I spent  35  minutes on this discharge activity which included: face-to-face encounter with the patient, reviewing the data in the system, coordination of the care with the nursing staff as well as consultants, documentation, and entering orders.            Electronically signed by Jam Brown MD at 12/03/20 [id number]

## 2020-12-10 ENCOUNTER — TELEPHONE (OUTPATIENT)
Dept: INTERNAL MEDICINE | Facility: CLINIC | Age: 55
End: 2020-12-10

## 2020-12-10 NOTE — TELEPHONE ENCOUNTER
PT CALLED STATING SHE HAD A STROKE ON THANKSGIVING    PT IS REQUESTING THAT HER HOSPITAL FOLLOW UP BE SCHEDULED WITH AN MD    PT IS REQUESTING DR BUTLER    PLEASE ADVISE AT: [phone]

## 2020-12-10 NOTE — PROGRESS NOTES
[facility]  [facility]      2020         Rosanne Araya  [address] [address]  [unfilled]    1965    Angelica Adame PA-C    Rosanne Araya is a 55 y.o. female.      Subjective:     Chief Complaint:  Stroke and Hypertension       HPI   55-year-old female with history of uncontrolled type 2 diabetes and hypertension who presents today as a hospital referral for CVA.  Patient admitted  with acute ischemic stroke of the right posterior cerebral artery.  Hypercoagulable work-up was normal.  Transthoracic echo showed normal EF with negative bubble study.  Patient was referred here today for MCOT to rule out atrial fibrillation as possible etiology for CVA.  She reports she is doing very well since discharge.  She is actually worked herself up to walking about half a block.  She still has some mild left sided weakness but overall feels this is improved significantly.  She recently was discharged to [facility].  She continued to have some high blood pressure [facility] and her Terazosin was increased to 8 mg twice daily.  She reports that her systolic blood pressures in the morning are mostly in the 130s but later in the evening are up to 170s.  Denies palpitations, chest pain or shortness of breath.    Patient Active Problem List   Diagnosis   • Uncontrolled type 2 diabetes mellitus with hyperglycemia (CMS/HCC)   • Essential hypertension   • Herpes labialis   • Family history of thyroid disease in mother   • Acute CVA (cerebrovascular accident) (CMS/HCC)   • Nausea   • Hypokalemia       Past Medical History:   Diagnosis Date   • Depression    • Diabetes mellitus (CMS/HCC)    • Herpes    • Hypertension    • Stroke (cerebrum) (CMS/HCC)        Past Surgical History:   Procedure Laterality Date   • ADENOIDECTOMY     • BACK SURGERY      disc slipped   •  SECTION     • TONSILLECTOMY     • WISDOM TOOTH EXTRACTION         Family History    Problem Relation Age of Onset   • Thyroid disease Mother    • Breast cancer Maternal Aunt    • Hypertension Maternal Grandmother    • Diabetes Maternal Grandmother    • Stroke Maternal Grandmother    • Heart attack Maternal Grandmother    • Hypertension Maternal Grandfather    • Diabetes Maternal Grandfather    • Hypertension Paternal Grandmother    • Diabetes Paternal Grandmother    • Heart failure Paternal Grandmother    • Hypertension Paternal Grandfather    • Heart attack Paternal Grandfather        Social History     Socioeconomic History   • Marital status:      Spouse name: Not on file   • Number of children: Not on file   • Years of education: Not on file   • Highest education level: Not on file   Tobacco Use   • Smoking status: Never Smoker   • Smokeless tobacco: Never Used   Substance and Sexual Activity   • Alcohol use: Never     Frequency: Never   • Drug use: Yes     Types: Marijuana     Comment: once weekly, last use 11/20/20   • Sexual activity: Defer   Social History Narrative    Lives in [place] with  and children    Work as     Caffeine 0       No Known Allergies      Current Outpatient Medications:   •  aspirin 81 MG chewable tablet, Chew 1 tablet Daily., Disp:  , Rfl:   •  atorvastatin (LIPITOR) 80 MG tablet, Take 1 tablet by mouth Every Night., Disp: 90 tablet, Rfl: 1  •  carvedilol (COREG) 6.25 MG tablet, Take 1 tablet by mouth 2 (Two) Times a Day With Meals., Disp:  , Rfl:   •  clopidogrel (PLAVIX) 75 MG tablet, Take 1 tablet by mouth Daily., Disp: 30 tablet, Rfl:   •  dilTIAZem CD (CARDIZEM CD) 300 MG 24 hr capsule, Take 1 capsule by mouth Daily., Disp: 90 capsule, Rfl: 1  •  docusate sodium 100 MG capsule, Take 1 capsule by mouth 2 (Two) Times a Day., Disp:  , Rfl:   •  famotidine (PEPCID) 20 MG tablet, Take 1 tablet by mouth 2 (Two) Times a Day Before Meals., Disp:  , Rfl:   •  glipizide (GLUCOTROL XL) 10 MG 24 hr tablet, Take 1 tablet by mouth 2 (two)  times a day., Disp: 180 tablet, Rfl: 1  •  glucose blood test strip, Check BS BID, Disp: 100 each, Rfl: 11  •  glucose monitor monitoring kit, Use BID to check BS, Disp: 1 each, Rfl: 0  •  Lancets misc, Check BS BID, Disp: 100 each, Rfl: 11  •  losartan (COZAAR) 100 MG tablet, Take 100 mg by mouth Daily., Disp: , Rfl:   •  magnesium oxide (MAGOX) 400 (241.3 Mg) MG tablet tablet, Take 1 tablet by mouth 2 (Two) Times a Day. (Patient taking differently: Take 500 mg by mouth 2 (Two) Times a Day.), Disp: 30 each, Rfl:   •  metFORMIN (GLUCOPHAGE) 1000 MG tablet, Take 1 tablet by mouth 2 (Two) Times a Day With Meals., Disp: 180 tablet, Rfl: 1  •  Semaglutide,0.25 or 0.5MG/DOS, (Ozempic, 0.25 or 0.5 MG/DOSE,) 2 MG/1.5ML solution pen-injector, Start with 0.25mg sc weekly x 4 weeks then increase to 0.5mg weekly, Disp: 3 pen, Rfl: 1  •  terazosin (HYTRIN) 2 MG capsule, Take 8 mg by mouth 2 (two) times a day., Disp: , Rfl:   •  venlafaxine XR (EFFEXOR-XR) 75 MG 24 hr capsule, Take 3 capsules by mouth Daily., Disp: 90 capsule, Rfl:   •  melatonin 5 MG tablet tablet, Take 1 tablet by mouth At Night As Needed (insomnia)., Disp:  , Rfl:   •  polyethylene glycol (MIRALAX) 17 g packet, Take 17 g by mouth 2 (two) times a day., Disp:  , Rfl:   •  spironolactone (ALDACTONE) 25 MG tablet, Take 1 tablet by mouth Daily., Disp: 30 tablet, Rfl: 2    The following portions of the patient's history were reviewed today and updated as appropriate: allergies, current medications, past family history, past medical history, past social history, past surgical history and problem list     Review of Systems   Constitution: Positive for malaise/fatigue. Negative for chills and fever.   HENT: Negative.    Eyes: Negative.    Cardiovascular: Negative for chest pain, claudication, cyanosis, dyspnea on exertion, irregular heartbeat, leg swelling, near-syncope, orthopnea, palpitations, paroxysmal nocturnal dyspnea and syncope.   Respiratory: Negative for  "cough, shortness of breath and snoring.    Endocrine: Negative.    Hematologic/Lymphatic: Does not bruise/bleed easily.   Skin: Negative for poor wound healing.   Musculoskeletal: Negative.    Gastrointestinal: Negative for abdominal pain, heartburn, hematemesis, melena, nausea and vomiting.   Genitourinary: Negative.  Negative for hematuria.   Neurological: Positive for focal weakness and weakness. Negative for dizziness and light-headedness.   Psychiatric/Behavioral: Negative.    Allergic/Immunologic: Negative.        Objective:     Vitals:    12/14/20 1102 12/14/20 1105 12/14/20 1106   BP: 158/79 152/83 142/77   BP Location: Right arm Left arm Left arm   Patient Position: Sitting Sitting Standing   Cuff Size: Adult Adult Adult   Pulse: 69 76 75   Resp:   18   Temp:   98.2 °F (36.8 °C)   TempSrc:   Temporal   SpO2: 98% 99% 98%   Weight:   80.9 kg (178 lb 4 oz)   Height:   162.6 cm (64\")       Body mass index is 30.6 kg/m².    Vitals signs reviewed.   Constitutional:       General: Not in acute distress.     Appearance: Well-developed.   Eyes:      Conjunctiva/sclera: Conjunctivae normal.      Pupils: Pupils are equal, round, and reactive to light.   HENT:      Head: Normocephalic.   Neck:      Musculoskeletal: Neck supple.      Thyroid: No thyromegaly.      Vascular: No JVD.   Pulmonary:      Effort: Pulmonary effort is normal. No respiratory distress.      Breath sounds: Normal breath sounds.   Chest:      Chest wall: Not tender to palpatation.   Cardiovascular:      Normal rate. Regular rhythm.      No gallop.   Pulses:     Intact distal pulses.   Abdominal:      General: Bowel sounds are normal.      Palpations: Abdomen is soft.   Musculoskeletal: Normal range of motion.   Skin:     General: Skin is warm and dry.   Neurological:      Mental Status: Alert and oriented to person, place, and time.   Psychiatric:         Behavior: Behavior normal.         Thought Content: Thought content normal.         Lab and " Diagnostic Review:  Renal artery duplex 11/27/20  Negative duplex renal ultrasound. No evidence of  hemodynamically significant right or left renal artery stenosis.  Echo 11/27/20  · Left ventricular systolic function is normal. Estimated left ventricular EF = 65%  · Left ventricular wall thickness is consistent with concentric hypertrophy.  · The cardiac valves are anatomically and functionally normal.  · Saline test results are negative.    Lab Results   Component Value Date    CHOL 122 11/27/2020    TRIG 134 11/27/2020    HDL 29 (L) 11/27/2020    LDL 69 11/27/2020     Lab Results   Component Value Date    GLUCOSE 177 (H) 11/28/2020    CALCIUM 9.0 11/28/2020     11/28/2020    K 3.9 11/28/2020    CO2 28.0 11/28/2020     11/28/2020    BUN 8 11/28/2020    CREATININE 0.60 11/28/2020    EGFRIFNONA 105 11/28/2020    BCR 13.3 11/28/2020    ANIONGAP 9.0 11/28/2020     EKG: Normal sinus rhythm, nonspecific T wave abnormality, measured , calculated  using Bazett's formula    Assessment and Plan:   1. Acute CVA (cerebrovascular accident) (CMS/[facility])  Continue statin and plavix  - Mobile Cardiac Outpatient Telemetry; Future  - Ambulatory Referral to Cardiology    2. Resistant hypertension  Blood pressures are still not at goal.  We will start her on spironolactone 25 mg daily.  She had significant hypokalemia in the hospital.  Repeat labs in 1 week.  Continue to monitor blood pressures daily and call if systolic pressures consistently greater than 140  - Basic Metabolic Panel; Future  - Ambulatory Referral to Cardiology    3. Prolonged Q-T interval on ECG    - ECG 12 Lead; Future    Telehealth visit in 2 weeks to re-evaluate blood pressure  Patient to see cardiology after MCOT  FU with [facility] PR[facility]    It has been a pleasure to participate in the care of this patient.  Patient was instructed to call the [facility] Center with any questions, concerns, or worsening symptoms.    *Please note that portions  of this note were completed with a voice recognition program. Efforts were made to edit the dictations, but occasionally words are mistranscribed.

## 2020-12-10 NOTE — TELEPHONE ENCOUNTER
Is she looking to change her care to MD, or just wants follow up with them? I don't know what Dr. Samson has available anytime soon, but you can check. Let me know what she is wanting to do.   (FYI to clinical staff- to my knowledge Dr. Samson is not taking new patients unless they have a family member who sees him, and he has to OK this).

## 2020-12-11 ENCOUNTER — TELEPHONE (OUTPATIENT)
Dept: PEDIATRICS | Facility: OTHER | Age: 55
End: 2020-12-11

## 2020-12-11 NOTE — TELEPHONE ENCOUNTER
Rosanne Araya [phone]  Spoke to pt, pt states while she was in the ED the MD that she saw advised to her that since she's had a stroke she should switch PCP's to an MD. Pt states they've already found an MD for pt off of [facility], Dr. Neftali Mccain, and she will be establishing care with him on 12/14/2020. Advised I will send message to PCP so she's aware the pt will be transferring care. Good verbal understanding.     VI

## 2020-12-11 NOTE — TELEPHONE ENCOUNTER
Caller: Rosanne Araya    Relationship to patient: Self    Best call back number: [phone]     New or established patient?  [x] New  [] Established    Date of discharge: 12/9/2020   Facility discharged from: [facility]     Diagnosis/Symptoms: STROKE    Length of stay (If applicable):   11/26/2020 - 12/4/2020  ([facility])   12/4/2020 - 12/9/2020 ([facility])    Specialty Only: Did you see a [facility] provider?    [x] Yes  [] No  If so, who? HOSPITALIST & NEUROLOGIST AT [facility], BEFORE [facility]

## 2020-12-14 ENCOUNTER — OFFICE VISIT (OUTPATIENT)
Dept: CARDIOLOGY | Facility: HOSPITAL | Age: 55
End: 2020-12-14

## 2020-12-14 ENCOUNTER — HOSPITAL ENCOUNTER (OUTPATIENT)
Dept: CARDIOLOGY | Facility: HOSPITAL | Age: 55
Discharge: HOME | End: 2020-12-14

## 2020-12-14 VITALS
DIASTOLIC BLOOD PRESSURE: 77 MMHG | BODY MASS INDEX: 30.43 KG/M2 | SYSTOLIC BLOOD PRESSURE: 142 MMHG | HEIGHT: 64 IN | TEMPERATURE: 98.2 F | RESPIRATION RATE: 18 BRPM | WEIGHT: 178.25 LBS | OXYGEN SATURATION: 98 % | HEART RATE: 75 BPM

## 2020-12-14 DIAGNOSIS — R94.31 PROLONGED Q-T INTERVAL ON ECG: ICD-10-CM

## 2020-12-14 DIAGNOSIS — I10 RESISTANT HYPERTENSION: ICD-10-CM

## 2020-12-14 DIAGNOSIS — I63.9 ACUTE CVA (CEREBROVASCULAR ACCIDENT) (HCC): Primary | ICD-10-CM

## 2020-12-14 DIAGNOSIS — I63.9 ACUTE CVA (CEREBROVASCULAR ACCIDENT) (HCC): ICD-10-CM

## 2020-12-14 LAB
QT INTERVAL: 430 MS
QTC INTERVAL: 467 MS

## 2020-12-14 PROCEDURE — 99214 OFFICE O/P EST MOD 30 MIN: CPT | Performed by: NURSE PRACTITIONER

## 2020-12-14 PROCEDURE — 93005 ELECTROCARDIOGRAM TRACING: CPT | Performed by: NURSE PRACTITIONER

## 2020-12-14 PROCEDURE — 93010 ELECTROCARDIOGRAM REPORT: CPT | Performed by: INTERNAL MEDICINE

## 2020-12-14 RX ORDER — SPIRONOLACTONE 25 MG/1
25 TABLET ORAL DAILY
Qty: 30 TABLET | Refills: 2 | Status: SHIPPED | OUTPATIENT
Start: 2020-12-14 | End: 2021-01-07 | Stop reason: SDUPTHER

## 2020-12-14 RX ORDER — TERAZOSIN 2 MG/1
8 CAPSULE ORAL 2 TIMES DAILY
COMMUNITY
End: 2021-01-07 | Stop reason: SDUPTHER

## 2020-12-14 NOTE — PROGRESS NOTES
[facility] Heart Monitor Documentation    Rosanne Araya  1965  [id number]  12/14/20    CHANDNI Jeffrey    [] ZIO XT Patch  Model [id number] Prescribed for N/A Days    · Serial Number: (N + 9 Digits) N   · Apply-By Date on Box:   · USPS Tracking Number:   · USPS Tracking        [x] [employer] MINI PLUS Mobile Cardiac Telemetry  Model BGMINIPLUS Prescribed for 30 Days    · Serial Number: (BGM + 7 Digits) [id number]  · Shipped-By Date on Box:   · UPS Tracking Number: [id number]  · UPS Tracking      [] [employer] MINI Holter Monitor  Model BGMINIEL Prescribed for N/A Days    · Serial Number: (7 Digits)   · Shipped-By Date on Box:   · UPS Tracking Number: [id number]  · UPS Tracking        This monitor was applied to the patient's chest and checked for proper functioning.  Ms. Rosanne Araya was instructed in the proper use of this monitor.  She was given the opportunity to ask questions and left the office with the device 's instruction manual.    Adenike Moody MA, 11:39 EST, 12/14/20                  [facility]

## 2020-12-15 ENCOUNTER — TELEPHONE (OUTPATIENT)
Dept: NEUROLOGY | Facility: CLINIC | Age: 55
End: 2020-12-15

## 2020-12-15 ENCOUNTER — OFFICE VISIT (OUTPATIENT)
Dept: FAMILY MEDICINE CLINIC | Facility: CLINIC | Age: 55
End: 2020-12-15

## 2020-12-15 VITALS
HEART RATE: 73 BPM | HEIGHT: 64 IN | OXYGEN SATURATION: 98 % | SYSTOLIC BLOOD PRESSURE: 134 MMHG | BODY MASS INDEX: 29.43 KG/M2 | DIASTOLIC BLOOD PRESSURE: 88 MMHG | WEIGHT: 172.4 LBS | RESPIRATION RATE: 16 BRPM

## 2020-12-15 DIAGNOSIS — I10 ESSENTIAL HYPERTENSION: ICD-10-CM

## 2020-12-15 DIAGNOSIS — I63.9 ACUTE CVA (CEREBROVASCULAR ACCIDENT) (HCC): ICD-10-CM

## 2020-12-15 DIAGNOSIS — Z30.431 IUD CHECK UP: Primary | ICD-10-CM

## 2020-12-15 DIAGNOSIS — E11.65 UNCONTROLLED TYPE 2 DIABETES MELLITUS WITH HYPERGLYCEMIA (HCC): ICD-10-CM

## 2020-12-15 PROBLEM — E78.5 DYSLIPIDEMIA: Status: ACTIVE | Noted: 2020-12-15

## 2020-12-15 LAB — CREAT BLDA-MCNC: 0.8 MG/DL (ref 0.6–1.3)

## 2020-12-15 PROCEDURE — 99204 OFFICE O/P NEW MOD 45 MIN: CPT | Performed by: INTERNAL MEDICINE

## 2020-12-15 NOTE — PROGRESS NOTES
Rosanne Araya  1965  [id number]  Patient Care Team:  Neftali Mccain MD as PCP - General (Internal Medicine)    Rosanne Araya is a 55 y.o. female here today to establish care.  This patient is accompanied by their self who contributes to the history of their care.    Chief Complaint:    Chief Complaint   Patient presents with   • Hospital Follow Up Visit     Pt was at  from 11/26-12/03 for a stroke, sees cardiology and is wearing a 30 day monitor       History of Present Illness:   This is a 52-year-old female is been a type II diabetic for a number of years.  She is followed by endocrinology.  She is also had difficult to control hypertension for 18 years.  She was admitted from 11 26-12 03 after suffering symptoms consistent with a posterior cerebral artery CVA.  She presented with left visual changes which have resolved as well as left-sided weakness.  She is right-handed dominant.  She spent approximately 5 days at [facility] was discharged this past Friday.  Since this time she is completely doing her own activities of daily living.  She is able to walk up and down steps in normal fashion.  She still not driving.  She is showering cooking.  She has been discharged from occupational therapy and did not need speech therapy.  She is continuing to do outpatient physical therapy.  Her vision is at baseline.  She typically sees her ophthalmologist once per year last seen in April.  She was placed on a continuous cardiac monitor yesterday.  She denies any palpitations or syncope.  No headaches.  No hypoglycemia.  Her sugars are in the 120s in the morning.  She has a continuous glucose monitor.  She is maintained on Ozempic, glipizide and Metformin.  Her blood pressure is controlled with Cardizem 300 mg daily, Cozaar 100 mg, Coreg 6.25 mg twice daily daily as well as Hytrin 2 mg daily.  Yesterday Aldactone was added however she has not started this.  Typically her blood pressure increases in the  evening to the 160s to 190s she thinks this is the end of the Hytrin effect.  Her blood pressure comes down after taking this.  Her lab work studies were reviewed.  She had a indeterminate antiphospholipid antibody 16.  Otherwise blood work was unremarkable.  Her MRI showed no completed stroke her CT a perfusion showed consistent with ischemia.  Carotid duplex was complete as well as echocardiogram.  She was discharged on dual antiplatelet therapy.  No epistasis no bruising.  Additionally medications at discharge include intensive statin therapy with Lipitor.    Past Medical History:   Diagnosis Date   • Depression    • Diabetes mellitus (CMS/HCC)    • Herpes    • Hypertension    • Stroke (cerebrum) (CMS/HCC)        Past Surgical History:   Procedure Laterality Date   • ADENOIDECTOMY     • BACK SURGERY      disc slipped   •  SECTION     • TONSILLECTOMY     • WISDOM TOOTH EXTRACTION          Family History   Problem Relation Age of Onset   • Thyroid disease Mother    • Breast cancer Maternal Aunt    • Hypertension Maternal Grandmother    • Diabetes Maternal Grandmother    • Stroke Maternal Grandmother    • Heart attack Maternal Grandmother    • Hypertension Maternal Grandfather    • Diabetes Maternal Grandfather    • Hypertension Paternal Grandmother    • Diabetes Paternal Grandmother    • Heart failure Paternal Grandmother    • Hypertension Paternal Grandfather    • Heart attack Paternal Grandfather        Social History     Socioeconomic History   • Marital status:      Spouse name: Not on file   • Number of children: Not on file   • Years of education: Not on file   • Highest education level: Not on file   Tobacco Use   • Smoking status: Never Smoker   • Smokeless tobacco: Never Used   Substance and Sexual Activity   • Alcohol use: Never     Frequency: Never   • Drug use: Yes     Types: Marijuana     Comment: once weekly, last use 20   • Sexual activity: Defer   Social History Narrative     "Lives in [place] with  and children    Work as     Caffeine 0       No Known Allergies    Review of Systems:    Review of Systems   Constitutional: Negative for chills, fatigue, fever, unexpected weight gain and unexpected weight loss.   HENT: Negative for ear pain, postnasal drip, sinus pressure and sore throat.    Eyes: Negative for blurred vision, double vision and visual disturbance.   Respiratory: Negative for cough, shortness of breath and wheezing.    Cardiovascular: Negative for chest pain, palpitations and leg swelling.   Gastrointestinal: Negative for abdominal pain, blood in stool, diarrhea, nausea and vomiting.   Endocrine: Negative for cold intolerance, heat intolerance, polydipsia, polyphagia and polyuria.   Genitourinary: Negative for dysuria, flank pain and hematuria.   Musculoskeletal: Negative for arthralgias and joint swelling.   Skin: Negative for dry skin and rash.   Neurological: Negative for weakness, numbness and headache.        Feels her weakness is resolved.  Has a history of visual changes.   Psychiatric/Behavioral: Negative for self-injury, suicidal ideas and depressed mood.       Vitals:    12/15/20 0940   BP: 134/88   Pulse: 73   Resp: 16   SpO2: 98%   Weight: 78.2 kg (172 lb 6.4 oz)   Height: 162.6 cm (64\")     Body mass index is 29.59 kg/m².      Current Outpatient Medications:   •  aspirin 81 MG chewable tablet, Chew 1 tablet Daily., Disp:  , Rfl:   •  atorvastatin (LIPITOR) 80 MG tablet, Take 1 tablet by mouth Every Night., Disp: 90 tablet, Rfl: 1  •  carvedilol (COREG) 6.25 MG tablet, Take 1 tablet by mouth 2 (Two) Times a Day With Meals., Disp:  , Rfl:   •  clopidogrel (PLAVIX) 75 MG tablet, Take 1 tablet by mouth Daily., Disp: 30 tablet, Rfl:   •  dilTIAZem CD (CARDIZEM CD) 300 MG 24 hr capsule, Take 1 capsule by mouth Daily., Disp: 90 capsule, Rfl: 1  •  docusate sodium 100 MG capsule, Take 1 capsule by mouth 2 (Two) Times a Day., Disp:  , Rfl:   •  " famotidine (PEPCID) 20 MG tablet, Take 1 tablet by mouth 2 (Two) Times a Day Before Meals., Disp:  , Rfl:   •  glipizide (GLUCOTROL XL) 10 MG 24 hr tablet, Take 1 tablet by mouth 2 (two) times a day., Disp: 180 tablet, Rfl: 1  •  glucose blood test strip, Check BS BID, Disp: 100 each, Rfl: 11  •  glucose monitor monitoring kit, Use BID to check BS, Disp: 1 each, Rfl: 0  •  Lancets misc, Check BS BID, Disp: 100 each, Rfl: 11  •  losartan (COZAAR) 100 MG tablet, Take 100 mg by mouth Daily., Disp: , Rfl:   •  magnesium oxide (MAGOX) 400 (241.3 Mg) MG tablet tablet, Take 1 tablet by mouth 2 (Two) Times a Day. (Patient taking differently: Take 500 mg by mouth 2 (Two) Times a Day.), Disp: 30 each, Rfl:   •  melatonin 5 MG tablet tablet, Take 1 tablet by mouth At Night As Needed (insomnia)., Disp:  , Rfl:   •  metFORMIN (GLUCOPHAGE) 1000 MG tablet, Take 1 tablet by mouth 2 (Two) Times a Day With Meals., Disp: 180 tablet, Rfl: 1  •  polyethylene glycol (MIRALAX) 17 g packet, Take 17 g by mouth 2 (two) times a day., Disp:  , Rfl:   •  Semaglutide,0.25 or 0.5MG/DOS, (Ozempic, 0.25 or 0.5 MG/DOSE,) 2 MG/1.5ML solution pen-injector, Start with 0.25mg sc weekly x 4 weeks then increase to 0.5mg weekly, Disp: 3 pen, Rfl: 1  •  spironolactone (ALDACTONE) 25 MG tablet, Take 1 tablet by mouth Daily., Disp: 30 tablet, Rfl: 2  •  terazosin (HYTRIN) 2 MG capsule, Take 8 mg by mouth 2 (two) times a day., Disp: , Rfl:   •  venlafaxine XR (EFFEXOR-XR) 75 MG 24 hr capsule, Take 3 capsules by mouth Daily., Disp: 90 capsule, Rfl:     Physical Exam:    Physical Exam  Vitals signs and nursing note reviewed.   Constitutional:       General: She is not in acute distress.     Appearance: Normal appearance. She is well-developed. She is not diaphoretic.   HENT:      Head: Normocephalic and atraumatic.      Right Ear: External ear normal.      Left Ear: External ear normal.      Nose: Nose normal.      Mouth/Throat:      Mouth: Mucous membranes are  moist.      Pharynx: Oropharynx is clear. No oropharyngeal exudate.   Eyes:      General: No scleral icterus.        Right eye: No discharge.         Left eye: No discharge.      Conjunctiva/sclera: Conjunctivae normal.      Pupils: Pupils are equal, round, and reactive to light.   Neck:      Musculoskeletal: Normal range of motion and neck supple.      Thyroid: No thyromegaly.      Vascular: No JVD.      Trachea: No tracheal deviation.   Cardiovascular:      Rate and Rhythm: Normal rate and regular rhythm.      Chest Wall: PMI is not displaced.      Pulses: Normal pulses.           Carotid pulses are 2+ on the right side and 2+ on the left side.       Radial pulses are 2+ on the right side and 2+ on the left side.      Heart sounds: Normal heart sounds, S1 normal and S2 normal. No friction rub. No gallop. No S3 or S4 sounds.       Comments: PMI nondisplaced  Pulmonary:      Effort: Pulmonary effort is normal.      Breath sounds: Normal breath sounds. No stridor. No wheezing or rales.   Abdominal:      General: Bowel sounds are normal.      Palpations: Abdomen is soft.      Tenderness: There is no abdominal tenderness. There is no guarding or rebound.   Musculoskeletal:      Right lower leg: No edema.      Left lower leg: No edema.      Comments: Normal gait   Lymphadenopathy:      Cervical: No cervical adenopathy.   Skin:     General: Skin is warm and dry.      Capillary Refill: Capillary refill takes less than 2 seconds.      Coloration: Skin is not pale.      Findings: No rash.   Neurological:      Mental Status: She is alert and oriented to person, place, and time.      Cranial Nerves: No cranial nerve deficit.      Motor: No abnormal muscle tone.      Coordination: Coordination normal.      Comments: Heel-to-toe gait with difficulty.  There is no pronator drift.  She has difficulty on the left side with rapidly alternating movements.  Her strength is graded 5 out of 5 proximally and distally upper and lower  extremity both flexor and extensor compartments.  No pathologic reflexes.  Romberg is negative   Psychiatric:         Judgment: Judgment normal.         Procedures    Results Review:    I reviewed the patient's new clinical results.    Assessment/Plan:  Is a 55-year-old diabetic with hypertension who presented with a acute right PCA ischemic symptoms.  MRI was not convincing for completed infarct however CT perfusion of the brain was highly suggestive.  She has been discharged on dual antiplatelet therapy will continue this.  She will continue physical therapy.  She will continue not to drive until cleared by neurology.  She will follow up with neurology as scheduled.  We discussed the seriousness of risk factor modification given the potential serious defect that she avoided.  She voices understanding.  She will continue to follow-up with her endocrinologist regarding her diabetic control, continue continuing blood glucose monitoring, Ozempic, glipizide, Metformin.  She will continue intensive statin therapy with Lipitor 80 mg daily.  Fasting lipid profile will be checked on follow-up.    Regarding her blood pressure, no changes were made today as she has not started her diuretic.  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  Of asked her to chart this.  Of asked her to follow-up in 4 to 6 weeks for blood pressure.  Renal duplex was completed and patient showed no evidence of renal artery stenosis.    Per her request in anticipation of returning for physical exam gynecology referral was made she indicates her IUD needs to be removed.  Problem List Items Addressed This Visit        Cardiovascular and Mediastinum    Essential hypertension    Relevant Medications    dilTIAZem CD (CARDIZEM CD) 300 MG 24 hr capsule    losartan (COZAAR) 100 MG tablet    carvedilol (COREG) 6.25 MG tablet    terazosin (HYTRIN) 2 MG capsule    spironolactone (ALDACTONE) 25 MG tablet     Acute CVA (cerebrovascular accident) (CMS/formerly [facility])       Endocrine    Uncontrolled type 2 diabetes mellitus with hyperglycemia (CMS/formerly [facility])    Relevant Medications    glucose monitor monitoring kit    Lancets misc    glucose blood test strip    Semaglutide,0.25 or 0.5MG/DOS, (Ozempic, 0.25 or 0.5 MG/DOSE,) 2 MG/1.5ML solution pen-injector    glipizide (GLUCOTROL XL) 10 MG 24 hr tablet    metFORMIN (GLUCOPHAGE) 1000 MG tablet      Other Visit Diagnoses     IUD check up    -  Primary    Relevant Orders    Ambulatory Referral to Gynecology          Plan of care reviewed with patient at the conclusion of today's visit. Education was provided regarding diagnosis and management.  Patient verbalizes understanding of and agreement with management plan.    Return in about 6 weeks (around 1/26/2021), or bp.    Neftali Mccain MD    Please note that portions of this note may have been completed with a voice recognition program. Efforts were made to edit the dictations, but occasionally words are mistranscribed.

## 2020-12-15 NOTE — TELEPHONE ENCOUNTER
JOANNA ROSAS  [phone]    JOANNA CALLED IN TODAY BECAUSE SHE HAS HOSPITAL FOLLOW UP WITH [facility] ON 03/10 AND SHE IS WONDERING IF IT COULD GET MOVED UP TO AN EARLIER DATE. SHE WAS SUPPOSE TO HAVE A FOLLOW UP WITHIN 4 WEEKS OF THANKSGIVING DAY ACCORDING THE THE HOSPITAL. PLEASE GIVE THE PATIENT A CALL IF YOU ARE ABLE TO SEE HER AT AN EARLIER DATE.

## 2020-12-15 NOTE — TELEPHONE ENCOUNTER
12/15/20 LVM TO LET PT KNOW THAT THERE WERE NO EARLIER APPOINTMENTS AND THAT SHE HAD BEEN PLACED ON THE CANCELLATION LIST.

## 2020-12-16 ENCOUNTER — DOCUMENTATION (OUTPATIENT)
Dept: CARDIOLOGY | Facility: HOSPITAL | Age: 55
End: 2020-12-16

## 2020-12-16 NOTE — PROGRESS NOTES
Patient called and stated that her monitor kept saying no skin contact and she has tried changing the monitors but still not having contact. Told patient to try wearing the monitor vertically instead of horizontal and see if that works. Told patient if that doesn't work she will need to call [employer]. Told patient I would send a couple more monitor strips in the mail.

## 2020-12-17 ENCOUNTER — TELEPHONE (OUTPATIENT)
Dept: NEUROLOGY | Facility: CLINIC | Age: 55
End: 2020-12-17

## 2020-12-17 NOTE — TELEPHONE ENCOUNTER
JOANNA ROSAS  [phone]    PT IS CALLING IN TODAY BECAUSE SHE HAS SOME NEUROLOGICAL QUESTIONS. SHE HAD STROKE BACK ON 11/26 AND IT WAS HER LEFT SIDE THAT WAS AFFECTED. SHE IS NOW HAVING WEAKNESS IN HER RIGHT SIDE AND SHE THINKS IT IS BECAUSE SHE IS OVERCOMPENSATING FOR HER LEFT SIDE. SHE TOOK HER BLOOD PRESSURE AT [facility] TODAY AND IT  OVER 82. SHE IS JUST HAVING SOME CONCERNS AND SHE WOULD LIKE IF SOMEONE WOULD GIVE HER A CALL BACK ASAP.

## 2020-12-22 ENCOUNTER — TELEPHONE (OUTPATIENT)
Dept: NEUROLOGY | Facility: CLINIC | Age: 55
End: 2020-12-22

## 2020-12-22 DIAGNOSIS — I63.89 CEREBROVASCULAR ACCIDENT (CVA) DUE TO OTHER MECHANISM (HCC): Primary | ICD-10-CM

## 2020-12-22 NOTE — TELEPHONE ENCOUNTER
PT WAS CALLING TO REQUEST A HOSP F/U APPT WITH THE [place] [facility]ICES STATING SHE DOES NOT WANT TO WAIT UNTIL 3/15/20 WITH DR. DUONG. SHE REQUESTED TO CANCEL APPT STATING SHE WASN'T GOING TO WAIT THAT LONG AND THAT SHE WOULD TRAVEL TO A DIFFERENT STATE IF SHE HAD TO, TO GET IN SOONER. THE [place] OFFICES ARE SCHEDULED OUT EVEN FURTHER.    PLEASE REACH OUT TO PT TO DISCUSS POSSIBLE OPTIONS GOING FORWARD REGARDING HOSP F/U.

## 2020-12-24 LAB
BH CV VAS TCD LEFT DISTAL M1: 58 CM/SEC
BH CV VAS TCD LEFT MID M1: 62 CM/SEC
BH CV VAS TCD LEFT P1: 74 CM/SEC
BH CV VAS TCD LEFT P2: 52 CM/SEC
BH CV VAS TCD LEFT PROXIMAL M1: 72 CM/SEC
BH CV VAS TCD LEFT TERMINAL ICA: 31 CM/SEC
BH CV VAS TCD RIGHT DISTAL M1: 60 CM/SEC
BH CV VAS TCD RIGHT MID M1: 73 CM/SEC
BH CV VAS TCD RIGHT P1: 18 CM/SEC
BH CV VAS TCD RIGHT P2: 23 CM/SEC
BH CV VAS TCD RIGHT PROXIMAL M1: 78 CM/SEC
BH CV VAS TCD RIGHT TERMINAL ICA: 64 CM/SEC

## 2020-12-28 NOTE — TELEPHONE ENCOUNTER
Spoke to patient she stated she believes what she was experiencing was due to working out too much and over exhausting herself. She is feeling fine at the current moment.

## 2020-12-29 ENCOUNTER — TELEPHONE (OUTPATIENT)
Dept: OBSTETRICS AND GYNECOLOGY | Facility: CLINIC | Age: 55
End: 2020-12-29

## 2020-12-29 NOTE — TELEPHONE ENCOUNTER
Patient called and was wanting to know if Dr. Lopez could order labs to check her FSH and LH levels before she comes in to have her birth control removed.    Please give her a call at [phone]

## 2020-12-30 ENCOUNTER — HOSPITAL ENCOUNTER (OUTPATIENT)
Dept: DIABETES SERVICES | Facility: HOSPITAL | Age: 55
Setting detail: RECURRING SERIES
Discharge: HOME | End: 2020-12-30

## 2020-12-30 NOTE — CONSULTS
Ms. Araya attended a 30 minute stroke/diabetes education class today via [employer].  We reviewed pathophysiology of diabetes and her current A1c of 8.4.  Specifically discussed significance of elevated A1c and risk of another stroke or MI.  We reviewed glucose goals and A1c goals per ADA.  Ms. Araya states she is currently taking Metformin and Glipizide for diabetes management.  She was started on Ozempic two weeks prior to stroke but states she hasn't been taking because it was causing n/v.  She states she's had several bouts of hypoglycemia in the last few weeks.  We reviewed the Rule of 15 and she was encouraged to notify PCP she is not taking the Ozempic.  It is likely Glipizide and not eating 3 consistent meals is the cause. She was instructed to check blood sugar 3 times per day and to call PCP for trending highs above 180.  We discussed FAST acronym and she was encouraged to call 911 asap if experiencing stroke symptoms.  We reviewed the healthy plate method, limiting carbs to 30-45 grams each meal, and avoiding sugary sodas and snacks.  Ms. Araya was encouraged to eat 3 consistent carb meals and a bedtime snack daily.  We discussed using postprandial blood sugars to problem solve meal choices.  Ms. Araya was encouraged to be as active as tolerated with a goal of 150 minutes per week.  We discussed aerobic and strength training.  She was encouraged to limit sodium to less than 2400 mg per day.  She was encouraged to limit fast food and red meat to twice per week.  We discussed checking feet daily, yearly eye exams, and obtaining all age appropriate vaccines.  We discussed monitoring A1c, BP, and cholesterol in the future to decrease risk.  Ms. Araya verbalized understanding.  She was mailed a Life with Diabetes Booklet, Diabetes Medication Guide, and my card for future questions.  Ms. Araya was very attentive and I expect great adherence to diabetes regimen. Thank you.

## 2021-01-07 ENCOUNTER — OFFICE VISIT (OUTPATIENT)
Dept: FAMILY MEDICINE CLINIC | Facility: CLINIC | Age: 56
End: 2021-01-07

## 2021-01-07 VITALS
OXYGEN SATURATION: 98 % | DIASTOLIC BLOOD PRESSURE: 86 MMHG | BODY MASS INDEX: 30.25 KG/M2 | HEIGHT: 64 IN | HEART RATE: 79 BPM | SYSTOLIC BLOOD PRESSURE: 148 MMHG | WEIGHT: 177.2 LBS

## 2021-01-07 DIAGNOSIS — E11.65 UNCONTROLLED TYPE 2 DIABETES MELLITUS WITH HYPERGLYCEMIA (HCC): ICD-10-CM

## 2021-01-07 DIAGNOSIS — I63.9 ACUTE CVA (CEREBROVASCULAR ACCIDENT) (HCC): Primary | ICD-10-CM

## 2021-01-07 DIAGNOSIS — I10 ESSENTIAL HYPERTENSION: ICD-10-CM

## 2021-01-07 DIAGNOSIS — N91.2 AMENORRHEA: ICD-10-CM

## 2021-01-07 DIAGNOSIS — E11.69 TYPE 2 DIABETES MELLITUS WITH OTHER SPECIFIED COMPLICATION, WITHOUT LONG-TERM CURRENT USE OF INSULIN (HCC): ICD-10-CM

## 2021-01-07 PROCEDURE — 99214 OFFICE O/P EST MOD 30 MIN: CPT | Performed by: INTERNAL MEDICINE

## 2021-01-07 RX ORDER — LOSARTAN POTASSIUM 100 MG/1
100 TABLET ORAL DAILY
Qty: 30 TABLET | Refills: 6 | Status: SHIPPED | OUTPATIENT
Start: 2021-01-07 | End: 2021-07-09 | Stop reason: SDUPTHER

## 2021-01-07 RX ORDER — DILTIAZEM HYDROCHLORIDE 300 MG/1
300 CAPSULE, COATED, EXTENDED RELEASE ORAL DAILY
Qty: 90 CAPSULE | Refills: 1 | Status: SHIPPED | OUTPATIENT
Start: 2021-01-07 | End: 2021-07-09 | Stop reason: SDUPTHER

## 2021-01-07 RX ORDER — FAMOTIDINE 20 MG/1
20 TABLET, FILM COATED ORAL
Start: 2021-01-07 | End: 2021-02-19

## 2021-01-07 RX ORDER — CLOPIDOGREL BISULFATE 75 MG/1
75 TABLET ORAL DAILY
Qty: 30 TABLET
Start: 2021-01-07 | End: 2021-03-17 | Stop reason: SDUPTHER

## 2021-01-07 RX ORDER — GLIPIZIDE 10 MG/1
10 TABLET, FILM COATED, EXTENDED RELEASE ORAL 2 TIMES DAILY
Qty: 180 TABLET | Refills: 1 | Status: SHIPPED | OUTPATIENT
Start: 2021-01-07 | End: 2021-02-24 | Stop reason: SDUPTHER

## 2021-01-07 RX ORDER — SPIRONOLACTONE 25 MG/1
25 TABLET ORAL DAILY
Qty: 30 TABLET | Refills: 2 | Status: SHIPPED | OUTPATIENT
Start: 2021-01-07 | End: 2021-03-12 | Stop reason: HOSPADM

## 2021-01-07 RX ORDER — CARVEDILOL 6.25 MG/1
12.5 TABLET ORAL 2 TIMES DAILY WITH MEALS
Status: SHIPPED
Start: 2021-01-07 | End: 2021-03-17 | Stop reason: SDUPTHER

## 2021-01-07 RX ORDER — ASPIRIN 81 MG/1
81 TABLET, CHEWABLE ORAL DAILY
Start: 2021-01-07 | End: 2022-02-08 | Stop reason: SDUPTHER

## 2021-01-07 RX ORDER — BACLOFEN 20 MG
500 TABLET ORAL 2 TIMES DAILY
Start: 2021-01-07 | End: 2021-02-19

## 2021-01-07 RX ORDER — ATORVASTATIN CALCIUM 80 MG/1
80 TABLET, FILM COATED ORAL NIGHTLY
Qty: 30 TABLET | Refills: 6 | Status: SHIPPED | OUTPATIENT
Start: 2021-01-07 | End: 2021-07-09 | Stop reason: SDUPTHER

## 2021-01-07 RX ORDER — TERAZOSIN 2 MG/1
8 CAPSULE ORAL 2 TIMES DAILY
Qty: 240 CAPSULE | Refills: 6 | Status: SHIPPED | OUTPATIENT
Start: 2021-01-07 | End: 2021-02-19 | Stop reason: DRUGHIGH

## 2021-01-07 RX ORDER — VENLAFAXINE HYDROCHLORIDE 75 MG/1
225 CAPSULE, EXTENDED RELEASE ORAL DAILY
Qty: 90 CAPSULE
Start: 2021-01-07 | End: 2021-03-17 | Stop reason: SDUPTHER

## 2021-01-07 NOTE — PROGRESS NOTES
Rosanne Araya  1965  [id number]  Patient Care Team:  Neftali Mccain MD as PCP - General (Internal Medicine)  Gio Julian MD as Consulting Physician (Neurology)  Royal Lopez MD as Gynecologist (Gynecology)    Rosanne Araya is a 55 y.o. female here today for follow up.     This patient is accompanied by their self who contributes to the history of their care.    Chief Complaint:    Chief Complaint   Patient presents with   • Hypertension     Med refills   • Hyperlipidemia        History of Present Illness:  I have reviewed and/or updated the patient's past medical, past surgical, family, social history, problem list and allergies as appropriate.     Ms. Araya returns to follow-up on her hypertension, dyslipidemia.  She sees endocrine and the beginning of February for diabetic follow-up.  Since her stroke this past time fall at [facility] she cannot read checked any residual defect.  If she does get hypoglycemic is the only time she may drag her left foot.  She denies any focal weakness numbness or tingling.  She is current on her ophthalmologist and sees them in the spring.  Denies any polyuria polydipsia.  Her blood pressure has been running in the 140s however never less than 80 at home.  She is on a host of medications including Cozaar 100 Coreg 6.25 mg p.o. twice daily diltiazem 300 mg daily Aldactone 25 mg daily and Terazosin 8 mg p.o. twice daily.  She does watch her salt intake.  Denies any chest pain palpitations or shortness of breath.    Review of Systems:    Review of Systems   Constitutional: Negative for chills, fatigue, fever, unexpected weight gain and unexpected weight loss.   HENT: Negative for ear pain, postnasal drip, sinus pressure and sore throat.    Eyes: Negative for blurred vision, double vision and visual disturbance.   Respiratory: Negative for cough, shortness of breath and wheezing.    Cardiovascular: Negative for chest pain, palpitations and leg swelling.  "  Gastrointestinal: Negative for abdominal pain, blood in stool, diarrhea, nausea and vomiting.   Endocrine: Negative for cold intolerance, heat intolerance, polydipsia, polyphagia and polyuria.   Genitourinary: Negative for dysuria, flank pain and hematuria.   Musculoskeletal: Negative for arthralgias and joint swelling.   Skin: Negative for dry skin and rash.   Neurological: Negative for weakness, numbness and headache.   Psychiatric/Behavioral: Negative for self-injury, suicidal ideas and depressed mood.       Vitals:    01/07/21 1034   BP: 148/86   BP Location: Right arm   Patient Position: Sitting   Cuff Size: Adult   Pulse: 79   SpO2: 98%   Weight: 80.4 kg (177 lb 3.2 oz)   Height: 162.6 cm (64.02\")     Body mass index is 30.4 kg/m².      Current Outpatient Medications:   •  aspirin 81 MG chewable tablet, Chew 1 tablet Daily., Disp:  , Rfl:   •  atorvastatin (LIPITOR) 80 MG tablet, Take 1 tablet by mouth Every Night., Disp: 30 tablet, Rfl: 6  •  carvedilol (COREG) 6.25 MG tablet, Take 2 tablets by mouth 2 (Two) Times a Day With Meals., Disp: , Rfl:   •  clopidogrel (PLAVIX) 75 MG tablet, Take 1 tablet by mouth Daily., Disp: 30 tablet, Rfl:   •  dilTIAZem CD (CARDIZEM CD) 300 MG 24 hr capsule, Take 1 capsule by mouth Daily., Disp: 90 capsule, Rfl: 1  •  docusate sodium 100 MG capsule, Take 1 capsule by mouth 2 (Two) Times a Day., Disp:  , Rfl:   •  famotidine (PEPCID) 20 MG tablet, Take 1 tablet by mouth 2 (Two) Times a Day Before Meals., Disp:  , Rfl:   •  glipizide (GLUCOTROL XL) 10 MG 24 hr tablet, Take 1 tablet by mouth 2 (two) times a day., Disp: 180 tablet, Rfl: 1  •  glucose blood test strip, Check BS BID, Disp: 100 each, Rfl: 11  •  glucose monitor monitoring kit, Use BID to check BS, Disp: 1 each, Rfl: 0  •  Lancets misc, Check BS BID, Disp: 100 each, Rfl: 11  •  losartan (COZAAR) 100 MG tablet, Take 1 tablet by mouth Daily., Disp: 30 tablet, Rfl: 6  •  magnesium oxide 500 MG tablet, Take 1 tablet by " mouth 2 (Two) Times a Day., Disp: , Rfl:   •  metFORMIN (GLUCOPHAGE) 1000 MG tablet, Take 1 tablet by mouth 2 (Two) Times a Day With Meals., Disp: 180 tablet, Rfl: 1  •  Semaglutide,0.25 or 0.5MG/DOS, (Ozempic, 0.25 or 0.5 MG/DOSE,) 2 MG/1.5ML solution pen-injector, Start with 0.25mg sc weekly x 4 weeks then increase to 0.5mg weekly, Disp: 3 pen, Rfl: 1  •  spironolactone (ALDACTONE) 25 MG tablet, Take 1 tablet by mouth Daily., Disp: 30 tablet, Rfl: 2  •  terazosin (HYTRIN) 2 MG capsule, Take 4 capsules by mouth 2 (two) times a day., Disp: 240 capsule, Rfl: 6  •  venlafaxine XR (EFFEXOR-XR) 75 MG 24 hr capsule, Take 3 capsules by mouth Daily., Disp: 90 capsule, Rfl:     Physical Exam:    Physical Exam  Vitals signs and nursing note reviewed.   Constitutional:       General: She is not in acute distress.     Appearance: Normal appearance. She is well-developed. She is not diaphoretic.   HENT:      Head: Normocephalic and atraumatic.      Right Ear: Ear canal and external ear normal.      Left Ear: Ear canal and external ear normal.      Mouth/Throat:      Pharynx: No oropharyngeal exudate.   Eyes:      General: No scleral icterus.        Right eye: No discharge.         Left eye: No discharge.      Extraocular Movements: Extraocular movements intact.      Conjunctiva/sclera: Conjunctivae normal.   Neck:      Musculoskeletal: Normal range of motion and neck supple.      Thyroid: No thyromegaly.      Vascular: No JVD.      Trachea: No tracheal deviation.   Cardiovascular:      Rate and Rhythm: Normal rate and regular rhythm.      Pulses: Normal pulses.      Heart sounds: Normal heart sounds.      Comments: PMI nondisplaced  Pulmonary:      Effort: Pulmonary effort is normal.      Breath sounds: Normal breath sounds. No wheezing or rales.   Abdominal:      General: Bowel sounds are normal.      Palpations: Abdomen is soft.      Tenderness: There is no abdominal tenderness. There is no guarding or rebound.    Musculoskeletal: Normal range of motion.      Comments: Normal gait   Lymphadenopathy:      Cervical: No cervical adenopathy.   Skin:     General: Skin is warm and dry.      Capillary Refill: Capillary refill takes less than 2 seconds.      Coloration: Skin is not pale.      Findings: No rash.   Neurological:      Mental Status: She is alert and oriented to person, place, and time.      Motor: No abnormal muscle tone.      Coordination: Coordination normal.   Psychiatric:         Mood and Affect: Mood normal.         Behavior: Behavior normal.         Judgment: Judgment normal.         Procedures    Results Review:    I reviewed the patient's new clinical results.    Assessment/Plan:    Problem List Items Addressed This Visit        Cardiac and Vasculature    Essential hypertension    Current Assessment & Plan     Hypertension is unchanged.  Continue current treatment regimen.  Dietary sodium restriction.  Regular aerobic exercise.  Medication changes per orders.  Blood pressure will be reassessed at the next regular appointment. We increased coreg to 12.5 mg bid         Relevant Medications    carvedilol (COREG) 6.25 MG tablet    losartan (COZAAR) 100 MG tablet    spironolactone (ALDACTONE) 25 MG tablet    dilTIAZem CD (CARDIZEM CD) 300 MG 24 hr capsule    magnesium oxide 500 MG tablet    terazosin (HYTRIN) 2 MG capsule    Other Relevant Orders    Comprehensive Metabolic Panel       Endocrine and Metabolic    Uncontrolled type 2 diabetes mellitus with hyperglycemia (CMS/HCC)    Relevant Medications    glucose monitor monitoring kit    Lancets misc    glucose blood test strip    Semaglutide,0.25 or 0.5MG/DOS, (Ozempic, 0.25 or 0.5 MG/DOSE,) 2 MG/1.5ML solution pen-injector    metFORMIN (GLUCOPHAGE) 1000 MG tablet    glipizide (GLUCOTROL XL) 10 MG 24 hr tablet    Other Relevant Orders    Comprehensive Metabolic Panel       Neuro    Acute CVA (cerebrovascular accident) (CMS/HCC) - Primary    Current Assessment &  Plan     She will continue Lipitor 80 mg daily at her aggressive blood pressure control regimen.  She will continue dual antiplatelet therapy.  She will continue her efforts at diabetic control.         Relevant Medications    atorvastatin (LIPITOR) 80 MG tablet    clopidogrel (PLAVIX) 75 MG tablet    aspirin 81 MG chewable tablet    Other Relevant Orders    Lipid Panel      Other Visit Diagnoses     Type 2 diabetes mellitus with other specified complication, without long-term current use of insulin (CMS/[facility])        Relevant Medications    metFORMIN (GLUCOPHAGE) 1000 MG tablet    glipizide (GLUCOTROL XL) 10 MG 24 hr tablet    famotidine (PEPCID) 20 MG tablet    Amenorrhea        Relevant Medications    venlafaxine XR (EFFEXOR-XR) 75 MG 24 hr capsule    Other Relevant Orders    Follicle stimulating hormone    Luteinizing hormone          Plan of care reviewed with patient at the conclusion of today's visit. Education was provided regarding diagnosis and management.  Patient verbalizes understanding of and agreement with management plan.    Return in about 6 months (around 7/7/2021).    Neftali Mccain MD    Please note that portions of this note may have been completed with a voice recognition program. Efforts were made to edit the dictations, but occasionally words are mistranscribed.

## 2021-01-07 NOTE — ASSESSMENT & PLAN NOTE
She will continue Lipitor 80 mg daily at her aggressive blood pressure control regimen.  She will continue dual antiplatelet therapy.  She will continue her efforts at diabetic control.

## 2021-01-07 NOTE — ASSESSMENT & PLAN NOTE
Hypertension is unchanged.  Continue current treatment regimen.  Dietary sodium restriction.  Regular aerobic exercise.  Medication changes per orders.  Blood pressure will be reassessed at the next regular appointment. We increased coreg to 12.5 mg bid

## 2021-01-07 NOTE — ASSESSMENT & PLAN NOTE
She was started on Lipitor 40 in October however this was increased to 80 mg while hospitalized for acute stroke.  I have placed an order for fasting lipid profile today.  She eats very healthfully and is increasing her efforts at physical activity.

## 2021-01-14 ENCOUNTER — TELEPHONE (OUTPATIENT)
Dept: CARDIOLOGY | Facility: HOSPITAL | Age: 56
End: 2021-01-14

## 2021-01-14 NOTE — TELEPHONE ENCOUNTER
Patient scheduled today to follow-up via telemedicine.  Attempted to call her multiple times but was unable to reach.  Left message that I want to make sure that she followed up because she still needs to have her labs repeated after starting spironolactone and also needs referral to cardiology.

## 2021-01-14 NOTE — TELEPHONE ENCOUNTER
----- Message from Argelia Arellano sent at 1/14/2021 10:02 AM EST -----  Yes [facility] called three times her voice mail box was full and we sent her a letter   ----- Message -----  From: Toña Roberto APRN  Sent: 1/13/2021   7:51 AM EST  To: Argelia Arellano    This patient was referred to cardiology 12/14. Can you please see why she never got an appt?    Thank you!

## 2021-01-19 ENCOUNTER — LAB (OUTPATIENT)
Dept: LAB | Facility: HOSPITAL | Age: 56
End: 2021-01-19

## 2021-01-19 DIAGNOSIS — I63.9 ACUTE CVA (CEREBROVASCULAR ACCIDENT) (HCC): ICD-10-CM

## 2021-01-19 DIAGNOSIS — I10 RESISTANT HYPERTENSION: ICD-10-CM

## 2021-01-19 DIAGNOSIS — I10 ESSENTIAL HYPERTENSION: ICD-10-CM

## 2021-01-19 DIAGNOSIS — E11.65 UNCONTROLLED TYPE 2 DIABETES MELLITUS WITH HYPERGLYCEMIA (HCC): ICD-10-CM

## 2021-01-19 DIAGNOSIS — N91.2 AMENORRHEA: ICD-10-CM

## 2021-01-19 LAB
ALBUMIN SERPL-MCNC: 4.2 G/DL (ref 3.5–5.2)
ALBUMIN/GLOB SERPL: 1.6 G/DL
ALP SERPL-CCNC: 74 U/L (ref 39–117)
ALT SERPL W P-5'-P-CCNC: 6 U/L (ref 1–33)
ANION GAP SERPL CALCULATED.3IONS-SCNC: 12.8 MMOL/L (ref 5–15)
AST SERPL-CCNC: 12 U/L (ref 1–32)
BILIRUB SERPL-MCNC: 0.5 MG/DL (ref 0–1.2)
BUN SERPL-MCNC: 18 MG/DL (ref 6–20)
BUN/CREAT SERPL: 17.5 (ref 7–25)
CALCIUM SPEC-SCNC: 8.9 MG/DL (ref 8.6–10.5)
CHLORIDE SERPL-SCNC: 102 MMOL/L (ref 98–107)
CHOLEST SERPL-MCNC: 112 MG/DL (ref 0–200)
CO2 SERPL-SCNC: 23.2 MMOL/L (ref 22–29)
CREAT SERPL-MCNC: 1.03 MG/DL (ref 0.57–1)
FSH SERPL-ACNC: 7.78 MIU/ML
GFR SERPL CREATININE-BSD FRML MDRD: 56 ML/MIN/1.73
GLOBULIN UR ELPH-MCNC: 2.7 GM/DL
GLUCOSE SERPL-MCNC: 202 MG/DL (ref 65–99)
HDLC SERPL-MCNC: 31 MG/DL (ref 40–60)
LDLC SERPL CALC-MCNC: 56 MG/DL (ref 0–100)
LDLC/HDLC SERPL: 1.7 {RATIO}
LH SERPL-ACNC: 8.16 MIU/ML
POTASSIUM SERPL-SCNC: 4.6 MMOL/L (ref 3.5–5.2)
PROT SERPL-MCNC: 6.9 G/DL (ref 6–8.5)
SODIUM SERPL-SCNC: 138 MMOL/L (ref 136–145)
TRIGL SERPL-MCNC: 142 MG/DL (ref 0–150)
VLDLC SERPL-MCNC: 25 MG/DL (ref 5–40)

## 2021-01-19 PROCEDURE — 80061 LIPID PANEL: CPT

## 2021-01-19 PROCEDURE — 83001 ASSAY OF GONADOTROPIN (FSH): CPT

## 2021-01-19 PROCEDURE — 83002 ASSAY OF GONADOTROPIN (LH): CPT

## 2021-01-19 PROCEDURE — 80053 COMPREHEN METABOLIC PANEL: CPT

## 2021-01-22 PROCEDURE — 93272 ECG/REVIEW INTERPRET ONLY: CPT | Performed by: INTERNAL MEDICINE

## 2021-02-12 NOTE — PROGRESS NOTES
Subjective:     Encounter Date:02/19/2021    Patient ID: Rosanne Araya is a 55 y.o.  white female from [place], [place], works as a  at [employer]    REFERRING PROVIDER: CHANDNI Monsivais  PHYSICIAN: Neftali Mccain MD  NEUROLOGIST: Jesús Costello MD  ENDOCRINOLOGY PROVIDER: Addie Mckeon PA-C    Chief Complaint:   Chief Complaint   Patient presents with   • Stroke     Problem List:  1. Right posterior cerebral artery CVA probable combined severe hypertension and uncontrolled diabetes with dyslipidemia as etiology.  a. CT head 11/26/2020: No acute intracranial abnormality, age-related changes of the brain including likely chronic right frontal lobe lacunar infarct   b. CT cerebral perfusion 11/26/2020: Findings consistent with focal right PCA territory ischemia and larger area of slow flow.  No apparent core infarct.  c. CTA head/neck 11/26/2020: Truncated appearance of the proximal right posterior cerebral artery consistent with patient's perfusion scan abnormality, potentially 50% or greater distal left cavernous carotid artery stenosis  d. Echocardiogram 11/27/2020: LVEF 65%, LV wall thickness consistent with concentric hypertrophy, the cardiac valves are anatomically and functionally normal, saline test results negative  e. Transcranial Doppler 11/27/2020: Normal mean flow velocities of bilateral MCA, terminal ICA.  No Doppler evidence suggestive of PFO  f. Mobile cardiac outpatient telemetry monitor 12/14/2020: Normal monitor study  g. Negative hypercoagulable work-up  h. MRI brain 11/26/2020: Scattered small old vessel ischemic changes, no evidence of recent ischemia  i. Mild residual left-sided weakness  2. Uncontrolled hypertension with negative renal artery duplex November 2020 for renal artery stenosis  3. Hyperlipidemia; on statin therapy  4. Type 2 diabetes mellitus; hemoglobin A1c 8.4% November 2020  5. Depression  6. Mild obesity: BMI 30.38  7. Herpes  labialis  8. Remote weekly marijuana use  9. Surgical history:  a. Tonsils and adenoids  b.    c. Laminectomy x2    No Known Allergies    Current Outpatient Medications:   •  aspirin 81 MG chewable tablet, Chew 1 tablet Daily., Disp:  , Rfl:   •  atorvastatin (LIPITOR) 80 MG tablet, Take 1 tablet by mouth Every Night., Disp: 30 tablet, Rfl: 6  •  carvedilol (COREG) 6.25 MG tablet, Take 2 tablets by mouth 2 (Two) Times a Day With Meals. (Patient taking differently: Take 6.25 mg by mouth 2 (Two) Times a Day With Meals.), Disp: , Rfl:   •  clopidogrel (PLAVIX) 75 MG tablet, Take 1 tablet by mouth Daily., Disp: 30 tablet, Rfl:   •  dilTIAZem CD (CARDIZEM CD) 300 MG 24 hr capsule, Take 1 capsule by mouth Daily., Disp: 90 capsule, Rfl: 1  •  docusate sodium 100 MG capsule, Take 1 capsule by mouth 2 (Two) Times a Day., Disp:  , Rfl:   •  glipizide (GLUCOTROL XL) 10 MG 24 hr tablet, Take 1 tablet by mouth 2 (two) times a day., Disp: 180 tablet, Rfl: 1  •  glucose blood test strip, Check BS BID, Disp: 100 each, Rfl: 11  •  glucose monitor monitoring kit, Use BID to check BS, Disp: 1 each, Rfl: 0  •  Lancets misc, Check BS BID, Disp: 100 each, Rfl: 11  •  losartan (COZAAR) 100 MG tablet, Take 1 tablet by mouth Daily., Disp: 30 tablet, Rfl: 6  •  metFORMIN (GLUCOPHAGE) 1000 MG tablet, Take 1 tablet by mouth 2 (Two) Times a Day With Meals., Disp: 180 tablet, Rfl: 1  •  spironolactone (ALDACTONE) 25 MG tablet, Take 1 tablet by mouth Daily., Disp: 30 tablet, Rfl: 2  •  terazosin (HYTRIN) 2 MG capsule, Take 4 capsules by mouth 2 (two) times a day., Disp: 240 capsule, Rfl: 6  •  venlafaxine XR (EFFEXOR-XR) 75 MG 24 hr capsule, Take 3 capsules by mouth Daily., Disp: 90 capsule, Rfl:     History of Present Illness  The patient is a 55 year old white female who presents to establish care for recent CVA 2020 with mild residual left-sided weakness.  She wore a mobile cardiac outpatient telemetry monitor in December  " which was a normal study.  She had a negative hypercoagulable work-up.  The patient's last hemoglobin A1c was 8.4% 2020 and she has an upcoming appointment with an endocrinologist 2021.  Her lipid panel was acceptable 2021.  She denies any chest pain, shortness of breath, presyncope, syncope, edema, MIs, stress tests, heart catheterizations, arrhythmias, rheumatic fever, COPD, family history of CAD, or asthma.  She used to smoke marijuana but is not doing this anymore.  She goes to the gym and is able to do all of her activities without any cardiopulmonary complaints.  Occasionally if she stands up too quickly she will have some lightheadedness for a second or two but she attributes this to her low blood pressure when standing.  The patient states that she has chronically had high blood pressure and she can tell whenever this occurs because she has a severe headache.  She has noticed since she had her stroke that she can hear \"swishing\" from her carotids whenever she is lying down at night.  She used to have palpitations if she would drink too much caffeine but she has not experienced this for \"a long time.\"  She denies any snoring or apneic spells.  She has never had a sleep study.    Cardiovascular Disease Risk Factors  Hypertension, hyperlipidemia, diabetes mellitus, obesity    Social History     Socioeconomic History   • Marital status:      Spouse name: Not on file   • Number of children: Not on file   • Years of education: Not on file   • Highest education level: Not on file   Tobacco Use   • Smoking status: Former Smoker     Quit date: 1980     Years since quittin.1   • Smokeless tobacco: Never Used   Substance and Sexual Activity   • Alcohol use: Never     Frequency: Never   • Drug use: Not Currently     Types: Marijuana     Comment: once weekly, last use 20   • Sexual activity: Defer   Social History Narrative    Lives in [place] with  and children    " Work as     Caffeine 0       Family History   Problem Relation Age of Onset   • Thyroid disease Mother    • Breast cancer Maternal Aunt    • Hypertension Maternal Grandmother    • Diabetes Maternal Grandmother    • Stroke Maternal Grandmother    • Heart attack Maternal Grandmother    • Hypertension Maternal Grandfather    • Diabetes Maternal Grandfather    • Hypertension Paternal Grandmother    • Diabetes Paternal Grandmother    • Heart failure Paternal Grandmother    • Hypertension Paternal Grandfather    • Heart attack Paternal Grandfather        Review of Systems   Constitution: Negative.   HENT: Negative.    Eyes: Negative.    Cardiovascular: Negative for chest pain, claudication, dyspnea on exertion, irregular heartbeat, leg swelling, near-syncope, orthopnea, palpitations, paroxysmal nocturnal dyspnea and syncope.   Respiratory: Negative for shortness of breath, sleep disturbances due to breathing and snoring.    Endocrine:        Type 2 diabetes mellitus   Hematologic/Lymphatic: Negative.    Skin: Negative.    Musculoskeletal: Negative.    Gastrointestinal: Negative.    Genitourinary: Negative.    Neurological: Positive for light-headedness. Negative for dizziness, focal weakness and seizures.        CVA November 2020 with mild residual left-sided weakness   Psychiatric/Behavioral: Negative.    Allergic/Immunologic: Negative.       Obtained and negative except as outlined in problem list and HPI.      ECG 12 Lead    Date/Time: 2/19/2021 1:49 PM  Performed by: Lauri Ventura MD  Authorized by: Lauri Ventura MD   Rhythm comments: Normal sinus rhythm, rightward axis, borderline ECG, 78 bpm,  ms, QRS 90 ms,  ms, no significant changes from last ECG December 2020                 Objective:       Vitals:    02/19/21 1250 02/19/21 1256 02/19/21 1257 02/19/21 1258   BP: 111/70 97/58 112/68 (!) 69/54   BP Location: Right arm Right arm Left arm Left arm   Patient Position: Sitting  "Standing Sitting Standing   Pulse: 82 87 86 91   SpO2: 97% 98% 97% 96%   Weight: 80.3 kg (177 lb)      Height: 162.6 cm (64\")        Body mass index is 30.38 kg/m².    Constitutional:       Appearance: Healthy appearance. Not in distress.   Eyes:      Funduscopic exam:     Right eye: AV nicking present. No hemorrhage or exudate.         Left eye: AV nicking present. No hemorrhage or exudate.   HENT:    Mouth/Throat:      Lips: Pink.      Mouth: Mucous membranes are moist. No injury, lacerations, oral lesions or angioedema.      Dentition: Normal dentition. Does not have dentures. No dental tenderness, gingival swelling, dental caries, dental abscesses or gum lesions.      Tongue: No lesions. Tongue does not deviate from midline.      Palate: No mass and lesions.      Pharynx: Oropharynx is clear. Uvula midline. No pharyngeal swelling, oropharyngeal exudate, posterior oropharyngeal erythema or uvula swelling.      Tonsils: No tonsillar exudate or tonsillar abscesses.   Neck:      Vascular: No JVR. JVD normal.   Pulmonary:      Effort: Pulmonary effort is normal.      Breath sounds: Normal breath sounds. No wheezing. No rhonchi. No rales.   Chest:      Chest wall: Not tender to palpatation.   Cardiovascular:      PMI at left midclavicular line. Normal rate. Regular rhythm. Normal S1. Normal S2.      Murmurs: There is a grade 2/6 mid frequency early systolic murmur at the LLSB.      No gallop. No click. No rub.   Pulses:     Intact distal pulses.   Edema:     Peripheral edema absent.   Abdominal:      General: Bowel sounds are normal.      Palpations: Abdomen is soft.      Tenderness: There is no abdominal tenderness.   Musculoskeletal: Normal range of motion.         General: No tenderness.   Skin:     General: Skin is warm and dry.   Neurological:      General: No focal deficit present.      Mental Status: Alert and oriented to person, place and time.         Lab Review:   Results for orders placed or performed in " visit on 01/19/21   Lipid Panel    Specimen: Blood   Result Value Ref Range    Total Cholesterol 112 0 - 200 mg/dL    Triglycerides 142 0 - 150 mg/dL    HDL Cholesterol 31 (L) 40 - 60 mg/dL    LDL Cholesterol  56 0 - 100 mg/dL    VLDL Cholesterol 25 5 - 40 mg/dL    LDL/HDL Ratio 1.70    Follicle stimulating hormone    Specimen: Blood   Result Value Ref Range    FSH 7.78 mIU/mL   Luteinizing hormone    Specimen: Blood   Result Value Ref Range    LH 8.16 mIU/mL   Comprehensive Metabolic Panel    Specimen: Blood   Result Value Ref Range    Glucose 202 (H) 65 - 99 mg/dL    BUN 18 6 - 20 mg/dL    Creatinine 1.03 (H) 0.57 - 1.00 mg/dL    Sodium 138 136 - 145 mmol/L    Potassium 4.6 3.5 - 5.2 mmol/L    Chloride 102 98 - 107 mmol/L    CO2 23.2 22.0 - 29.0 mmol/L    Calcium 8.9 8.6 - 10.5 mg/dL    Total Protein 6.9 6.0 - 8.5 g/dL    Albumin 4.20 3.50 - 5.20 g/dL    ALT (SGPT) 6 1 - 33 U/L    AST (SGOT) 12 1 - 32 U/L    Alkaline Phosphatase 74 39 - 117 U/L    Total Bilirubin 0.5 0.0 - 1.2 mg/dL    eGFR Non African Amer 56 (L) >60 mL/min/1.73    Globulin 2.7 gm/dL    A/G Ratio 1.6 g/dL    BUN/Creatinine Ratio 17.5 7.0 - 25.0    Anion Gap 12.8 5.0 - 15.0 mmol/L     Renal artery duplex 11/27/2020:  Negative duplex renal ultrasound. No evidence of hemodynamically significant right or left renal artery stenosis.    Echocardiogram 11/27/2020:  · Left ventricular systolic function is normal. Estimated left ventricular EF = 65%  · Left ventricular wall thickness is consistent with concentric hypertrophy.  · The cardiac valves are anatomically and functionally normal.  · Saline test results are negative    CT head 11/26/2020:  No acute intracranial abnormality. Age-related changes of the brain including likely chronic right frontal lobe lacunar infarct    CT cerebral perfusion 11/26/2020:  Findings consistent with focal right PCA territory ischemia, and larger area of slow flow. No apparent core infarct.    CTA head/neck  11/26/2020:  1. Truncated appearance of the proximal right posterior cerebral artery  consistent with patient's perfusion scan abnormality.  2. Potentially 50% or greater distal left cavernous carotid artery  stenosis    Chest x-ray 11/26/2020:   No evidence of active chest disease    Assessment:   Patient with CVA November 2020 with mild residual left-sided weakness.  The patient wore a mobile cardiac outpatient telemetry monitor which was a negative study.  Her echocardiogram was acceptable and negative for PFO.  She has had low blood pressure so we will decrease her Terazosin to 2 mg twice daily and asked her to monitor her blood pressure and call us in 2 weeks with her readings.  The patient will follow up with her endocrinology provider for her uncontrolled type 2 diabetes mellitus. Consider outpatient nocturnal oximetry screening study at some point.     Diagnosis Plan   1. Acute CVA (cerebrovascular accident) (CMS/[facility])  No recurrent CVA symptoms   2. Essential hypertension  Controlled, decrease Terazosin to 2 mg twice daily, monitor blood pressure and call office in 1-2 weeks with readings   3. Dyslipidemia  Acceptable lipid panel January 2021, continue atorvastatin   4. Uncontrolled type 2 diabetes mellitus with hyperglycemia (CMS/[facility])  Hemoglobin A1c 8.4% November 2020, decrease carbs, increase physical activity as tolerated, follow-up with endocrinology          Plan:       1. Patient to continue current medications and close follow up with the above providers.  2. Tentative cardiology follow up in March 2021 or patient may return sooner PRN.  3. Decrease Terazosin to 2 mg bid and monitor blood pressure, call in 1-2 weeks with readings  4. 1 800 card    Scribed for Lauri Ventura MD by Marivel Becker, APRN. 2/19/2021  14:27 EST    I, Lauri Ventura MD, [facility], personally performed the services described in this documentation as scribed by the above named individual in my presence, and it is both  accurate and complete. At 14:29 EST on 02/19/2021

## 2021-02-19 ENCOUNTER — CONSULT (OUTPATIENT)
Dept: CARDIOLOGY | Facility: CLINIC | Age: 56
End: 2021-02-19

## 2021-02-19 VITALS
HEIGHT: 64 IN | HEART RATE: 91 BPM | OXYGEN SATURATION: 96 % | DIASTOLIC BLOOD PRESSURE: 54 MMHG | BODY MASS INDEX: 30.22 KG/M2 | SYSTOLIC BLOOD PRESSURE: 69 MMHG | WEIGHT: 177 LBS

## 2021-02-19 DIAGNOSIS — E78.5 DYSLIPIDEMIA: ICD-10-CM

## 2021-02-19 DIAGNOSIS — I10 ESSENTIAL HYPERTENSION: ICD-10-CM

## 2021-02-19 DIAGNOSIS — E11.65 UNCONTROLLED TYPE 2 DIABETES MELLITUS WITH HYPERGLYCEMIA (HCC): ICD-10-CM

## 2021-02-19 DIAGNOSIS — I63.9 ACUTE CVA (CEREBROVASCULAR ACCIDENT) (HCC): Primary | ICD-10-CM

## 2021-02-19 PROCEDURE — 93000 ELECTROCARDIOGRAM COMPLETE: CPT | Performed by: INTERNAL MEDICINE

## 2021-02-19 PROCEDURE — 99204 OFFICE O/P NEW MOD 45 MIN: CPT | Performed by: INTERNAL MEDICINE

## 2021-02-19 RX ORDER — TERAZOSIN 2 MG/1
2 CAPSULE ORAL 2 TIMES DAILY
Qty: 180 CAPSULE | Refills: 3 | Status: SHIPPED | OUTPATIENT
Start: 2021-02-19 | End: 2021-07-09 | Stop reason: SDUPTHER

## 2021-02-24 ENCOUNTER — OFFICE VISIT (OUTPATIENT)
Dept: ENDOCRINOLOGY | Facility: CLINIC | Age: 56
End: 2021-02-24

## 2021-02-24 VITALS
TEMPERATURE: 97.1 F | SYSTOLIC BLOOD PRESSURE: 124 MMHG | BODY MASS INDEX: 31.14 KG/M2 | HEART RATE: 73 BPM | DIASTOLIC BLOOD PRESSURE: 88 MMHG | WEIGHT: 181.4 LBS | OXYGEN SATURATION: 98 %

## 2021-02-24 DIAGNOSIS — E11.65 UNCONTROLLED TYPE 2 DIABETES MELLITUS WITH HYPERGLYCEMIA (HCC): Primary | Chronic | ICD-10-CM

## 2021-02-24 DIAGNOSIS — R80.9 MICROALBUMINURIA DUE TO TYPE 2 DIABETES MELLITUS (HCC): Chronic | ICD-10-CM

## 2021-02-24 DIAGNOSIS — E11.649 DIABETIC HYPOGLYCEMIA (HCC): ICD-10-CM

## 2021-02-24 DIAGNOSIS — E11.29 MICROALBUMINURIA DUE TO TYPE 2 DIABETES MELLITUS (HCC): Chronic | ICD-10-CM

## 2021-02-24 LAB
GLUCOSE BLDC GLUCOMTR-MCNC: 213 MG/DL (ref 70–130)
HBA1C MFR BLD: 6.6 %

## 2021-02-24 PROCEDURE — 83036 HEMOGLOBIN GLYCOSYLATED A1C: CPT | Performed by: PHYSICIAN ASSISTANT

## 2021-02-24 PROCEDURE — 82947 ASSAY GLUCOSE BLOOD QUANT: CPT | Performed by: PHYSICIAN ASSISTANT

## 2021-02-24 PROCEDURE — 99214 OFFICE O/P EST MOD 30 MIN: CPT | Performed by: PHYSICIAN ASSISTANT

## 2021-02-24 RX ORDER — EMPAGLIFLOZIN 10 MG/1
10 TABLET, FILM COATED ORAL DAILY
Qty: 90 TABLET | Refills: 1 | Status: SHIPPED | OUTPATIENT
Start: 2021-02-24 | End: 2021-07-09 | Stop reason: SDUPTHER

## 2021-02-24 RX ORDER — GLIPIZIDE 10 MG/1
TABLET, FILM COATED, EXTENDED RELEASE ORAL
Qty: 90 TABLET | Refills: 1 | Status: SHIPPED | OUTPATIENT
Start: 2021-02-24 | End: 2021-07-09 | Stop reason: SDUPTHER

## 2021-02-24 NOTE — PROGRESS NOTES
"Chief Complaint  F/u for Diabetes Mellitus.    HPI   Rosanne Araya is a 55 y.o. female who is here today for f/u of Diabetes Mellitus type 2. The initial diagnosis of diabetes was made 2010. Had GDM 2009.     Had a stroke 11/2020. No residual weakness.   Stopped Ozempic due to nausea. Had to take zofran.     A1C- 6.6 (2/24/2021), 8.7 (10/26/2020), 8.3 (8/5/2020)    Diabetic complications: none  Eye exam current (within one year): utd, 11/2019, at   Foot care and dental care: discussed    Current diabetic medications include:  Metformin 1000mg BID  Glipizide XL 10mg BID    Statin: no    Past medications: po meds, unsure of name    Diabetic Monitoring  -   Meter readings reviewed- she is mostly testing once a day with readings ranging from 49 to 241, one reading in the evening- 266, she has hypoglycemia (mostly at night) when she doesn't eat much - every couple of weeks     Nutrition:     Current diet: in general, an \"unhealthy\" diet, on average, 2-3 meals per day  Current exercise: none    The following portions of the patient's history were reviewed and updated by me as appropriate: allergies, current medications, past family history, past social history, past surgical history and problem list.      Past Medical History:   Diagnosis Date   • Depression    • Diabetes mellitus (CMS/HCC)    • Herpes    • Hypertension    • Stroke (cerebrum) (CMS/[facility])        Medications    Current Outpatient Medications:   •  aspirin 81 MG chewable tablet, Chew 1 tablet Daily., Disp:  , Rfl:   •  atorvastatin (LIPITOR) 80 MG tablet, Take 1 tablet by mouth Every Night., Disp: 30 tablet, Rfl: 6  •  carvedilol (COREG) 6.25 MG tablet, Take 2 tablets by mouth 2 (Two) Times a Day With Meals. (Patient taking differently: Take 6.25 mg by mouth 2 (Two) Times a Day With Meals.), Disp: , Rfl:   •  clopidogrel (PLAVIX) 75 MG tablet, Take 1 tablet by mouth Daily., Disp: 30 tablet, Rfl:   •  dilTIAZem CD (CARDIZEM CD) 300 MG 24 hr capsule, Take " 1 capsule by mouth Daily., Disp: 90 capsule, Rfl: 1  •  glipizide (GLUCOTROL XL) 10 MG 24 hr tablet, Take 10 mg daily in the morning, Disp: 90 tablet, Rfl: 1  •  glucose blood test strip, Check BS BID, Disp: 100 each, Rfl: 11  •  glucose monitor monitoring kit, Use BID to check BS, Disp: 1 each, Rfl: 0  •  Lancets misc, Check BS BID, Disp: 100 each, Rfl: 11  •  losartan (COZAAR) 100 MG tablet, Take 1 tablet by mouth Daily., Disp: 30 tablet, Rfl: 6  •  metFORMIN (GLUCOPHAGE) 1000 MG tablet, Take 1 tablet by mouth 2 (Two) Times a Day With Meals., Disp: 180 tablet, Rfl: 1  •  spironolactone (ALDACTONE) 25 MG tablet, Take 1 tablet by mouth Daily., Disp: 30 tablet, Rfl: 2  •  terazosin (HYTRIN) 2 MG capsule, Take 1 capsule by mouth 2 (two) times a day., Disp: 180 capsule, Rfl: 3  •  venlafaxine XR (EFFEXOR-XR) 75 MG 24 hr capsule, Take 3 capsules by mouth Daily., Disp: 90 capsule, Rfl:   •  Empagliflozin (Jardiance) 10 MG tablet, Take 10 mg by mouth Daily., Disp: 90 tablet, Rfl: 1    Review of Systems  Review of Systems   Constitutional: Negative for fatigue and unexpected weight change.   HENT: Negative for trouble swallowing and voice change.    Eyes: Negative for visual disturbance.   Respiratory: Negative for cough, shortness of breath and wheezing.    Cardiovascular: Negative for chest pain.   Gastrointestinal: Negative for abdominal pain, nausea and vomiting.   Endocrine: Negative for cold intolerance, heat intolerance, polydipsia, polyphagia and polyuria.   Genitourinary: Negative for frequency.   Skin: Negative for pallor.   Neurological: Negative for dizziness, tremors, syncope and weakness.   Psychiatric/Behavioral: Negative for confusion and suicidal ideas.        Physical Exam    /88   Pulse 73   Temp 97.1 °F (36.2 °C)   Wt 82.3 kg (181 lb 6.4 oz)   SpO2 98%   BMI 31.14 kg/m² Body mass index is 31.14 kg/m².  Physical Exam  Constitutional:       General: She is not in acute distress.     Appearance:  She is well-developed. She is not diaphoretic.   HENT:      Head: Normocephalic.      Right Ear: External ear normal.      Left Ear: External ear normal.      Mouth/Throat:      Pharynx: No oropharyngeal exudate.   Eyes:      General: Lids are normal.         Right eye: No discharge.         Left eye: No discharge.      Conjunctiva/sclera: Conjunctivae normal.      Pupils:      Right eye: Pupil is reactive.      Left eye: Pupil is reactive.   Neck:      Thyroid: No thyroid mass or thyromegaly.      Vascular: No JVD.      Trachea: No tracheal deviation.   Cardiovascular:      Rate and Rhythm: Normal rate and regular rhythm.      Heart sounds: Normal heart sounds. No murmur.   Pulmonary:      Effort: Pulmonary effort is normal. No respiratory distress.      Breath sounds: Normal breath sounds. No wheezing.   Abdominal:      General: Bowel sounds are normal.      Palpations: Abdomen is soft.      Tenderness: There is no abdominal tenderness.   Musculoskeletal:         General: No tenderness.   Lymphadenopathy:      Cervical: No cervical adenopathy.   Skin:     General: Skin is warm and dry.      Findings: No erythema or rash.   Neurological:      Mental Status: She is alert and oriented to person, place, and time.   Psychiatric:         Speech: Speech normal.         Behavior: Behavior normal.         Thought Content: Thought content normal.         Labs and Imaging   Lab Results   Component Value Date    HGBA1C 6.6 02/24/2021    HGBA1C 8.40 (H) 11/27/2020    HGBA1C 8.7 10/26/2020       Assessment / Plan   Diagnoses and all orders for this visit:    1. Uncontrolled type 2 diabetes mellitus with hyperglycemia (CMS/[facility]) (Primary)  -     POC Glucose, Blood  -     POC Glycosylated Hemoglobin (Hb A1C)  -     glipizide (GLUCOTROL XL) 10 MG 24 hr tablet; Take 10 mg daily in the morning  Dispense: 90 tablet; Refill: 1  -     Empagliflozin (Jardiance) 10 MG tablet; Take 10 mg by mouth Daily.  Dispense: 90 tablet; Refill:  1    2. Microalbuminuria due to type 2 diabetes mellitus (CMS/[facility])    3. Diabetic hypoglycemia (CMS/[facility])        Diabetes Mellitus 2 is under inadequate control.  -A1c 6.6, down from 8.7 though most BG readings per meter review are higher, mid 100s to 200s. Suspect a1c is low due to hypoglycemia, she has a 49 reading on her meter  -discussed A1C goal <7, FBS <120, 2 hours post prandial BS <180  -decrease glipizide xl to just once a day  -add jardiance 10 mg daily with hx of CVA. Risks reviewed. Samples and copay provided. Discussed importance of adequate hydration and good hygiene with this medication.   -continue metformin  -did not tolerate GLP-1 agonist due to nausea  -asked pt to call if she is having frequent BG <70 or >200  -eye exam updated 11/2020, foot exam was done 10/2020, labs are utd    Hypoglycemia  -decrease glipizide    1.  Diet: 3-4 carb servings per meal for females, 4-5 carb servings per meal for males  Spread carb intake throughout the day  Increase lean protein and vegetable intake  Avoid sugary drinks and processed carbs including crackers, cookies, cakes  2.  Exercise: Recommend at least 30 minutes of exercise daily, at least 5 days per week. Increase exercise gradually.   3.  Blood Glucose Goal: Blood glucose goal <150 fasting, <180 2 hr postprandial  4.  Microalbumin due 8/2021  5.  Education performed during this visit: long term diabetic complications discussed. , annual eye examinations at Ophthalmology discussed, dental hygiene discussed  and foot care reviewed., home glucose monitoring emphasized, all medications, side effects and compliance discussed carefully and Hypoglycemia management and prevention reviewed. Reviewed ‘ABCs’ of diabetes management (respective goals in parentheses):  A1C (<7), blood pressure (<130/80), and cholesterol (LDL <100, if CVD <70).    Microalbuminuria  -repeat yearly  -she is on losartan    There are no Patient Instructions on file for this visit.    Follow  up: Return in about 3 months (around 5/24/2021).    Discussed the nature of the disease including, risks, complications, implications, management, safe and proper use of medications. Encouraged therapeutic lifestyle changes including low calorie diet with plenty of fruits and vegetables, daily exercise, medication compliance, and keeping scheduled follow up appointments with me and any other providers. Encouraged patient to have appointment for complete physical, fasting labs, appropriate screenings, and immunizations on an annual basis.      Signed by: Addie Mckeon PA-C

## 2021-03-11 ENCOUNTER — APPOINTMENT (OUTPATIENT)
Dept: CT IMAGING | Facility: HOSPITAL | Age: 56
End: 2021-03-11

## 2021-03-11 ENCOUNTER — APPOINTMENT (OUTPATIENT)
Dept: GENERAL RADIOLOGY | Facility: HOSPITAL | Age: 56
End: 2021-03-11

## 2021-03-11 ENCOUNTER — HOSPITAL ENCOUNTER (OUTPATIENT)
Facility: HOSPITAL | Age: 56
Setting detail: OBSERVATION
Discharge: HOME | End: 2021-03-12
Attending: EMERGENCY MEDICINE | Admitting: INTERNAL MEDICINE

## 2021-03-11 ENCOUNTER — APPOINTMENT (OUTPATIENT)
Dept: MRI IMAGING | Facility: HOSPITAL | Age: 56
End: 2021-03-11

## 2021-03-11 DIAGNOSIS — G45.9 TIA (TRANSIENT ISCHEMIC ATTACK): Primary | ICD-10-CM

## 2021-03-11 PROBLEM — F32.A ANXIETY AND DEPRESSION: Status: ACTIVE | Noted: 2021-03-11

## 2021-03-11 PROBLEM — N17.9 AKI (ACUTE KIDNEY INJURY) (HCC): Status: ACTIVE | Noted: 2021-03-11

## 2021-03-11 PROBLEM — F41.9 ANXIETY AND DEPRESSION: Status: ACTIVE | Noted: 2021-03-11

## 2021-03-11 LAB
ALBUMIN SERPL-MCNC: 4.3 G/DL (ref 3.5–5.2)
ALBUMIN/GLOB SERPL: 1.5 G/DL
ALP SERPL-CCNC: 70 U/L (ref 39–117)
ALT SERPL W P-5'-P-CCNC: 8 U/L (ref 1–33)
ALT SERPL W P-5'-P-CCNC: 8 U/L (ref 1–33)
AMPHET+METHAMPHET UR QL: NEGATIVE
AMPHETAMINES UR QL: NEGATIVE
ANION GAP SERPL CALCULATED.3IONS-SCNC: 10 MMOL/L (ref 5–15)
APTT PPP: 30.3 SECONDS (ref 50–95)
AST SERPL-CCNC: 14 U/L (ref 1–32)
AST SERPL-CCNC: 16 U/L (ref 1–32)
BACTERIA UR QL AUTO: ABNORMAL /HPF
BARBITURATES UR QL SCN: NEGATIVE
BASE EXCESS BLDA CALC-SCNC: -5 MMOL/L (ref -5–5)
BASOPHILS # BLD AUTO: 0.04 10*3/MM3 (ref 0–0.2)
BASOPHILS NFR BLD AUTO: 0.4 % (ref 0–1.5)
BENZODIAZ UR QL SCN: NEGATIVE
BILIRUB SERPL-MCNC: 0.3 MG/DL (ref 0–1.2)
BILIRUB UR QL STRIP: NEGATIVE
BUN SERPL-MCNC: 27 MG/DL (ref 6–20)
BUN/CREAT SERPL: 20.3 (ref 7–25)
BUPRENORPHINE SERPL-MCNC: NEGATIVE NG/ML
CA-I BLDA-SCNC: 1.22 MMOL/L (ref 1.2–1.32)
CALCIUM SPEC-SCNC: 9.3 MG/DL (ref 8.6–10.5)
CANNABINOIDS SERPL QL: NEGATIVE
CHLORIDE SERPL-SCNC: 103 MMOL/L (ref 98–107)
CLARITY UR: CLEAR
CO2 BLDA-SCNC: 22 MMOL/L (ref 24–29)
CO2 SERPL-SCNC: 22 MMOL/L (ref 22–29)
COCAINE UR QL: NEGATIVE
COLOR UR: YELLOW
CREAT BLDA-MCNC: 1.4 MG/DL (ref 0.6–1.3)
CREAT SERPL-MCNC: 1.33 MG/DL (ref 0.57–1)
DEPRECATED RDW RBC AUTO: 40.9 FL (ref 37–54)
EOSINOPHIL # BLD AUTO: 0.38 10*3/MM3 (ref 0–0.4)
EOSINOPHIL NFR BLD AUTO: 4.1 % (ref 0.3–6.2)
ERYTHROCYTE [DISTWIDTH] IN BLOOD BY AUTOMATED COUNT: 12 % (ref 12.3–15.4)
GFR SERPL CREATININE-BSD FRML MDRD: 42 ML/MIN/1.73
GLOBULIN UR ELPH-MCNC: 2.9 GM/DL
GLUCOSE BLDC GLUCOMTR-MCNC: 214 MG/DL (ref 70–130)
GLUCOSE BLDC GLUCOMTR-MCNC: 90 MG/DL (ref 70–130)
GLUCOSE SERPL-MCNC: 90 MG/DL (ref 65–99)
GLUCOSE UR STRIP-MCNC: ABNORMAL MG/DL
HCO3 BLDA-SCNC: 20.5 MMOL/L (ref 22–26)
HCT VFR BLD AUTO: 37.1 % (ref 34–46.6)
HCT VFR BLDA CALC: 36 % (ref 38–51)
HGB BLD-MCNC: 12.4 G/DL (ref 12–15.9)
HGB BLDA-MCNC: 12.2 G/DL (ref 12–17)
HGB UR QL STRIP.AUTO: ABNORMAL
HOLD SPECIMEN: NORMAL
HYALINE CASTS UR QL AUTO: ABNORMAL /LPF
IMM GRANULOCYTES # BLD AUTO: 0.02 10*3/MM3 (ref 0–0.05)
IMM GRANULOCYTES NFR BLD AUTO: 0.2 % (ref 0–0.5)
KETONES UR QL STRIP: NEGATIVE
LEUKOCYTE ESTERASE UR QL STRIP.AUTO: ABNORMAL
LYMPHOCYTES # BLD AUTO: 3.04 10*3/MM3 (ref 0.7–3.1)
LYMPHOCYTES NFR BLD AUTO: 32.5 % (ref 19.6–45.3)
MCH RBC QN AUTO: 30.6 PG (ref 26.6–33)
MCHC RBC AUTO-ENTMCNC: 33.4 G/DL (ref 31.5–35.7)
MCV RBC AUTO: 91.6 FL (ref 79–97)
METHADONE UR QL SCN: NEGATIVE
MONOCYTES # BLD AUTO: 0.73 10*3/MM3 (ref 0.1–0.9)
MONOCYTES NFR BLD AUTO: 7.8 % (ref 5–12)
NEUTROPHILS NFR BLD AUTO: 5.13 10*3/MM3 (ref 1.7–7)
NEUTROPHILS NFR BLD AUTO: 55 % (ref 42.7–76)
NITRITE UR QL STRIP: NEGATIVE
NRBC BLD AUTO-RTO: 0 /100 WBC (ref 0–0.2)
OPIATES UR QL: NEGATIVE
OXYCODONE UR QL SCN: NEGATIVE
PCO2 BLDA: 35.9 MM HG (ref 35–45)
PCP UR QL SCN: NEGATIVE
PH BLDA: 7.36 PH UNITS (ref 7.35–7.6)
PH UR STRIP.AUTO: <=5 [PH] (ref 5–8)
PLATELET # BLD AUTO: 312 10*3/MM3 (ref 140–450)
PMV BLD AUTO: 10 FL (ref 6–12)
PO2 BLDA: 51 MMHG (ref 80–105)
POTASSIUM BLDA-SCNC: 5 MMOL/L (ref 3.5–4.9)
POTASSIUM SERPL-SCNC: 4.9 MMOL/L (ref 3.5–5.2)
PROPOXYPH UR QL: NEGATIVE
PROT SERPL-MCNC: 7.2 G/DL (ref 6–8.5)
PROT UR QL STRIP: NEGATIVE
RBC # BLD AUTO: 4.05 10*6/MM3 (ref 3.77–5.28)
RBC # UR: ABNORMAL /HPF
REF LAB TEST METHOD: ABNORMAL
SAO2 % BLDA: 85 % (ref 95–98)
SARS-COV-2 RNA RESP QL NAA+PROBE: NOT DETECTED
SODIUM BLD-SCNC: 137 MMOL/L (ref 138–146)
SODIUM SERPL-SCNC: 135 MMOL/L (ref 136–145)
SP GR UR STRIP: 1.02 (ref 1–1.03)
SQUAMOUS #/AREA URNS HPF: ABNORMAL /HPF
TRICYCLICS UR QL SCN: NEGATIVE
TROPONIN T SERPL-MCNC: <0.01 NG/ML (ref 0–0.03)
UROBILINOGEN UR QL STRIP: ABNORMAL
WBC # BLD AUTO: 9.34 10*3/MM3 (ref 3.4–10.8)
WBC UR QL AUTO: ABNORMAL /HPF
WHOLE BLOOD HOLD SPECIMEN: NORMAL
WHOLE BLOOD HOLD SPECIMEN: NORMAL

## 2021-03-11 PROCEDURE — U0003 INFECTIOUS AGENT DETECTION BY NUCLEIC ACID (DNA OR RNA); SEVERE ACUTE RESPIRATORY SYNDROME CORONAVIRUS 2 (SARS-COV-2) (CORONAVIRUS DISEASE [COVID-19]), AMPLIFIED PROBE TECHNIQUE, MAKING USE OF HIGH THROUGHPUT TECHNOLOGIES AS DESCRIBED BY CMS-2020-01-R: HCPCS | Performed by: EMERGENCY MEDICINE

## 2021-03-11 PROCEDURE — 85014 HEMATOCRIT: CPT

## 2021-03-11 PROCEDURE — 93005 ELECTROCARDIOGRAM TRACING: CPT | Performed by: EMERGENCY MEDICINE

## 2021-03-11 PROCEDURE — 82947 ASSAY GLUCOSE BLOOD QUANT: CPT

## 2021-03-11 PROCEDURE — 82565 ASSAY OF CREATININE: CPT

## 2021-03-11 PROCEDURE — 81001 URINALYSIS AUTO W/SCOPE: CPT | Performed by: PSYCHIATRY & NEUROLOGY

## 2021-03-11 PROCEDURE — G0378 HOSPITAL OBSERVATION PER HR: HCPCS

## 2021-03-11 PROCEDURE — 99284 EMERGENCY DEPT VISIT MOD MDM: CPT

## 2021-03-11 PROCEDURE — 84450 TRANSFERASE (AST) (SGOT): CPT | Performed by: EMERGENCY MEDICINE

## 2021-03-11 PROCEDURE — 84460 ALANINE AMINO (ALT) (SGPT): CPT | Performed by: EMERGENCY MEDICINE

## 2021-03-11 PROCEDURE — 84484 ASSAY OF TROPONIN QUANT: CPT | Performed by: EMERGENCY MEDICINE

## 2021-03-11 PROCEDURE — 84132 ASSAY OF SERUM POTASSIUM: CPT

## 2021-03-11 PROCEDURE — 80053 COMPREHEN METABOLIC PANEL: CPT | Performed by: PSYCHIATRY & NEUROLOGY

## 2021-03-11 PROCEDURE — 80306 DRUG TEST PRSMV INSTRMNT: CPT | Performed by: PHYSICIAN ASSISTANT

## 2021-03-11 PROCEDURE — 70450 CT HEAD/BRAIN W/O DYE: CPT

## 2021-03-11 PROCEDURE — 96361 HYDRATE IV INFUSION ADD-ON: CPT

## 2021-03-11 PROCEDURE — 71045 X-RAY EXAM CHEST 1 VIEW: CPT

## 2021-03-11 PROCEDURE — 99215 OFFICE O/P EST HI 40 MIN: CPT | Performed by: NURSE PRACTITIONER

## 2021-03-11 PROCEDURE — 85730 THROMBOPLASTIN TIME PARTIAL: CPT | Performed by: EMERGENCY MEDICINE

## 2021-03-11 PROCEDURE — 84295 ASSAY OF SERUM SODIUM: CPT

## 2021-03-11 PROCEDURE — 85025 COMPLETE CBC W/AUTO DIFF WBC: CPT | Performed by: EMERGENCY MEDICINE

## 2021-03-11 PROCEDURE — 99220 PR INITIAL OBSERVATION CARE/DAY 70 MINUTES: CPT | Performed by: INTERNAL MEDICINE

## 2021-03-11 PROCEDURE — 70551 MRI BRAIN STEM W/O DYE: CPT

## 2021-03-11 PROCEDURE — 82330 ASSAY OF CALCIUM: CPT

## 2021-03-11 PROCEDURE — C9803 HOPD COVID-19 SPEC COLLECT: HCPCS

## 2021-03-11 PROCEDURE — 87086 URINE CULTURE/COLONY COUNT: CPT | Performed by: PSYCHIATRY & NEUROLOGY

## 2021-03-11 PROCEDURE — 82803 BLOOD GASES ANY COMBINATION: CPT

## 2021-03-11 PROCEDURE — 96360 HYDRATION IV INFUSION INIT: CPT

## 2021-03-11 RX ORDER — CARVEDILOL 6.25 MG/1
6.25 TABLET ORAL 2 TIMES DAILY WITH MEALS
Status: DISCONTINUED | OUTPATIENT
Start: 2021-03-11 | End: 2021-03-12 | Stop reason: HOSPADM

## 2021-03-11 RX ORDER — ASPIRIN 81 MG/1
81 TABLET, CHEWABLE ORAL DAILY
Status: DISCONTINUED | OUTPATIENT
Start: 2021-03-12 | End: 2021-03-12 | Stop reason: HOSPADM

## 2021-03-11 RX ORDER — ATORVASTATIN CALCIUM 40 MG/1
80 TABLET, FILM COATED ORAL NIGHTLY
Status: DISCONTINUED | OUTPATIENT
Start: 2021-03-11 | End: 2021-03-12 | Stop reason: HOSPADM

## 2021-03-11 RX ORDER — VENLAFAXINE HYDROCHLORIDE 75 MG/1
225 CAPSULE, EXTENDED RELEASE ORAL DAILY
Status: DISCONTINUED | OUTPATIENT
Start: 2021-03-12 | End: 2021-03-12 | Stop reason: HOSPADM

## 2021-03-11 RX ORDER — SODIUM CHLORIDE 0.9 % (FLUSH) 0.9 %
10 SYRINGE (ML) INJECTION AS NEEDED
Status: DISCONTINUED | OUTPATIENT
Start: 2021-03-11 | End: 2021-03-12 | Stop reason: HOSPADM

## 2021-03-11 RX ORDER — TERAZOSIN 2 MG/1
2 CAPSULE ORAL EVERY 12 HOURS SCHEDULED
Status: DISCONTINUED | OUTPATIENT
Start: 2021-03-11 | End: 2021-03-12 | Stop reason: HOSPADM

## 2021-03-11 RX ORDER — DEXTROSE MONOHYDRATE 25 G/50ML
25 INJECTION, SOLUTION INTRAVENOUS
Status: DISCONTINUED | OUTPATIENT
Start: 2021-03-11 | End: 2021-03-12 | Stop reason: HOSPADM

## 2021-03-11 RX ORDER — CLOPIDOGREL BISULFATE 75 MG/1
75 TABLET ORAL DAILY
Status: DISCONTINUED | OUTPATIENT
Start: 2021-03-12 | End: 2021-03-12 | Stop reason: HOSPADM

## 2021-03-11 RX ORDER — SODIUM CHLORIDE 0.9 % (FLUSH) 0.9 %
10 SYRINGE (ML) INJECTION EVERY 12 HOURS SCHEDULED
Status: DISCONTINUED | OUTPATIENT
Start: 2021-03-11 | End: 2021-03-12 | Stop reason: HOSPADM

## 2021-03-11 RX ORDER — NICOTINE POLACRILEX 4 MG
15 LOZENGE BUCCAL
Status: DISCONTINUED | OUTPATIENT
Start: 2021-03-11 | End: 2021-03-12 | Stop reason: HOSPADM

## 2021-03-11 RX ORDER — SODIUM CHLORIDE 9 MG/ML
75 INJECTION, SOLUTION INTRAVENOUS CONTINUOUS
Status: ACTIVE | OUTPATIENT
Start: 2021-03-11 | End: 2021-03-12

## 2021-03-11 RX ORDER — ASPIRIN 300 MG/1
300 SUPPOSITORY RECTAL DAILY
Status: DISCONTINUED | OUTPATIENT
Start: 2021-03-12 | End: 2021-03-12 | Stop reason: HOSPADM

## 2021-03-11 RX ADMIN — SODIUM CHLORIDE 75 ML/HR: 9 INJECTION, SOLUTION INTRAVENOUS at 22:03

## 2021-03-11 RX ADMIN — SODIUM CHLORIDE, PRESERVATIVE FREE 10 ML: 5 INJECTION INTRAVENOUS at 22:03

## 2021-03-11 RX ADMIN — CARVEDILOL 6.25 MG: 6.25 TABLET, FILM COATED ORAL at 22:03

## 2021-03-11 RX ADMIN — TERAZOSIN HYDROCHLORIDE 2 MG: 2 CAPSULE ORAL at 22:03

## 2021-03-11 RX ADMIN — ATORVASTATIN CALCIUM 80 MG: 40 TABLET, FILM COATED ORAL at 22:03

## 2021-03-11 NOTE — CONSULTS
Stroke Consult Note    Patient Name: Rosanne Araya   MRN: [id number]  Age: 55 y.o.  Sex: female  : 1965    Primary Care Physician: Neftali Mccain MD  Referring Physician:  No ref. provider found    TIME PATIENT SEEN:  EST    Handedness: Right  Race:      Chief Complaint/Reason for Consultation: Visual changes    Subjective .  HPI: Rosanne Araya is a 55 yr old female with a PMH significant for RPCA stroke (2020 with residual left sided weakness), T2DM, HTN, and HLD that presented to the [facility] ED with c/o a sudden onset of complete hemianopsia in her left eye that began @ approximately 1500.     She was previously admitted to [facility] in 2020 with a RPCA stroke. Etiology was not determined. She was discharged with a cardiac monitor that she states fell off after 4 days. No events were noted during that time. Hypercoagulable work up was unremarkable. A KHURRAM was to be scheduled as an outpatient that did not occur. She followed up with cardiology in February who decreased her Terazosin d/t dizziness and hypotension. She continues on DAPT.     On arrival to [facility], she was alert and oriented. Left complete hemianopsia noted by the previous stroke navigator. Baseline intermittent LLE drift noted. Arrival NIH 4. CTH with no acute intracranial abnormality. MRI with no acute infarction. Symptoms resolved soon after arrival. NIH currently 0. D/t the unknown etiology of her previous stroke and an additional occurrence of symptoms, she will be admitted for observation and a possible KHURRAM tomorrow.     Last Known Normal Date/Time: 1500 3/11/2021 EST     Review of Systems   HENT: Negative.    Eyes: Positive for visual disturbance.   Respiratory: Negative.    Cardiovascular: Negative.    Gastrointestinal: Negative.    Endocrine: Negative.    Genitourinary: Negative.    Musculoskeletal: Negative.    Skin: Negative.    Allergic/Immunologic: Negative.    Neurological: Positive for weakness.    Hematological: Negative.    Psychiatric/Behavioral: Negative.       Past Medical History:   Diagnosis Date   • Depression    • Diabetes mellitus (CMS/HCC)    • Herpes    • Hypertension    • Stroke (cerebrum) (CMS/HCC)      Past Surgical History:   Procedure Laterality Date   • BACK SURGERY      disc slipped   •  SECTION     • TONSILLECTOMY AND ADENOIDECTOMY     • WISDOM TOOTH EXTRACTION       Family History   Problem Relation Age of Onset   • Thyroid disease Mother    • Breast cancer Maternal Aunt    • Hypertension Maternal Grandmother    • Diabetes Maternal Grandmother    • Stroke Maternal Grandmother    • Heart attack Maternal Grandmother    • Hypertension Maternal Grandfather    • Diabetes Maternal Grandfather    • Hypertension Paternal Grandmother    • Diabetes Paternal Grandmother    • Heart failure Paternal Grandmother    • Hypertension Paternal Grandfather    • Heart attack Paternal Grandfather      Social History     Socioeconomic History   • Marital status:      Spouse name: Not on file   • Number of children: Not on file   • Years of education: Not on file   • Highest education level: Not on file   Tobacco Use   • Smoking status: Former Smoker     Quit date:      Years since quittin.2   • Smokeless tobacco: Never Used   Substance and Sexual Activity   • Alcohol use: Never   • Drug use: Not Currently     Types: Marijuana     Comment: once weekly, last use 20   • Sexual activity: Defer     No Known Allergies  Prior to Admission medications    Medication Sig Start Date End Date Taking? Authorizing Provider   aspirin 81 MG chewable tablet Chew 1 tablet Daily. 21   Neftali Mccain MD   atorvastatin (LIPITOR) 80 MG tablet Take 1 tablet by mouth Every Night. 21  Neftali Mccain MD   carvedilol (COREG) 6.25 MG tablet Take 2 tablets by mouth 2 (Two) Times a Day With Meals.  Patient taking differently: Take 6.25 mg by mouth 2 (Two) Times a Day With Meals. 21    Neftali Mccain MD   clopidogrel (PLAVIX) 75 MG tablet Take 1 tablet by mouth Daily. 1/7/21   Neftali Mccain MD   dilTIAZem CD (CARDIZEM CD) 300 MG 24 hr capsule Take 1 capsule by mouth Daily. 1/7/21   Neftali Mccain MD   Empagliflozin (Jardiance) 10 MG tablet Take 10 mg by mouth Daily. 2/24/21   Addie Mckeon PA-C   glipizide (GLUCOTROL XL) 10 MG 24 hr tablet Take 10 mg daily in the morning 2/24/21   Addie Mckeon PA-C   glucose blood test strip Check BS BID 10/26/20   Addie Mckeon PA-C   glucose monitor monitoring kit Use BID to check BS 10/26/20   Addie Mckeon PA-C   Lancets misc Check BS BID 10/26/20   Addie Mckeon PA-C   losartan (COZAAR) 100 MG tablet Take 1 tablet by mouth Daily. 1/7/21   Neftali Mccain MD   metFORMIN (GLUCOPHAGE) 1000 MG tablet Take 1 tablet by mouth 2 (Two) Times a Day With Meals. 1/7/21   Neftali Mccain MD   spironolactone (ALDACTONE) 25 MG tablet Take 1 tablet by mouth Daily. 1/7/21   Neftali Mccain MD   terazosin (HYTRIN) 2 MG capsule Take 1 capsule by mouth 2 (two) times a day. 2/19/21   Marivel Becker APRN   venlafaxine XR (EFFEXOR-XR) 75 MG 24 hr capsule Take 3 capsules by mouth Daily. 1/7/21   Neftali Mccain MD             Objective     Temp:  [98.4 °F (36.9 °C)] 98.4 °F (36.9 °C)  Heart Rate:  [68-77] 68  Resp:  [15-16] 16  BP: (139-156)/(84-93) 156/84  Neurological Exam  Mental Status  Awake and alert. Oriented to person, place, time and situation. Speech is normal. Language is fluent with no aphasia.    Cranial Nerves  CN II: Visual fields full to confrontation.  CN III, IV, VI: Extraocular movements intact bilaterally. Normal lids and orbits bilaterally. Pupils equal round and reactive to light bilaterally.  CN V: Facial sensation is normal.  CN VII: Full and symmetric facial movement.  CN IX, X: Palate elevates symmetrically  CN XI: Shoulder shrug strength is normal.  CN XII: Tongue midline without atrophy or  fasciculations.    Motor   Strength is 5/5 in all four extremities except as noted.  Intermittent residual LLE drift.    Sensory  Light touch is normal in upper and lower extremities.     Reflexes                                           Right                      Left  Biceps                                 2+                         2+  Patellar                                2+                         2+  Plantar                           Downgoing                Downgoing    Coordination  Right: Finger-to-nose normal.  Left: Finger-to-nose normal.    Gait  Not assessed.      Physical Exam  Constitutional:       General: She is awake.      Appearance: Normal appearance.   HENT:      Head: Normocephalic.   Eyes:      General: Lids are normal.      Extraocular Movements: Extraocular movements intact.      Pupils: Pupils are equal, round, and reactive to light.   Cardiovascular:      Rate and Rhythm: Normal rate and regular rhythm.   Pulmonary:      Effort: Pulmonary effort is normal.      Breath sounds: Normal breath sounds.   Abdominal:      General: Bowel sounds are normal.      Palpations: Abdomen is soft.   Musculoskeletal:         General: Normal range of motion.      Cervical back: Normal range of motion and neck supple.   Skin:     General: Skin is warm and dry.   Neurological:      General: No focal deficit present.      Mental Status: She is alert and oriented to person, place, and time. Mental status is at baseline.      Deep Tendon Reflexes:      Reflex Scores:       Bicep reflexes are 2+ on the right side and 2+ on the left side.       Patellar reflexes are 2+ on the right side and 2+ on the left side.  Psychiatric:         Mood and Affect: Mood normal.         Speech: Speech normal.         Behavior: Behavior normal.         Acute Stroke Data    Alteplase (tPA) Inclusion / Exclusion Criteria    Time: 2015 EST  Person Administering Scale: CHANDNI Rojo    Inclusion Criteria  [x]   18 years  of age or greater   []   Onset of symptoms < 4.5 hours before beginning treatment (stroke onset = time patient was last seen well or without symptoms).   []   Diagnosis of acute ischemic stroke causing measurable disabling deficit (Complete Hemianopia, Any Aphasia, Visual or Sensory Extinction, Any weakness limiting sustained effort against gravity)   []   Any remaining deficit considered potentially disabling in view of patient and practitioner   Exclusion criteria (Do not proceed with Alteplase if any are checked under exclusion criteria)  []   Onset unknown or GREATER than 4.5 hours   []   ICH on CT/MRI   []   CT demonstrates hypodensity representing acute or subacute infarct   []   Significant head trauma or prior stroke in the previous 3 months   []   Symptoms suggestive of subarachnoid hemorrhage   []   History of un-ruptured intracranial aneurysm GREATER than 10 mm   []   Recent intracranial or intraspinal surgery within the last 3 months   []   Arterial puncture at a non-compressible site in the previous 7 days   []   Active internal bleeding   []   Acute bleeding tendency   []   Platelet count LESS than 100,000 for known hematological diseases such as leukemia, thrombocytopenia or chronic cirrhosis   []   Current use of anticoagulant with INR GREATER than 1.7 or PT GREATER than 15 seconds, aPTT GREATER than 40 seconds   []   Heparin received within 48 hours, resulting in abnormally elevated aPTT GREATER than upper limit of normal   []   Current use of direct thrombin inhibitors or direct factor Xa inhibitors in the past 48 hours   []   Elevated blood pressure refractory to treatment (systolic GREATER than 185 mm/Hg or diastolic  GREATER than 110 mm/Hg   []   Suspected infective endocarditis and aortic arch dissection   []   Current use of therapeutic treatment dose of low-molecular-weight heparin (LMWH) within the previous 24 hours   []   Structural GI malignancy or bleed   Relative exclusion for all  patients  []   Only minor non-disabling symptoms   []   Pregnancy   []   Seizure at onset with postictal residual neurological impairments   []   Major surgery or previous trauma within past 14 days   []   History of previous spontaneous ICH, intracranial neoplasm, or AV malformation   []   Postpartum (within previous 14 days)   []   Recent GI or urinary tract hemorrhage (within previous 21 days)   []   Recent acute MI (within previous 3 months)   []   History of un-ruptured intracranial aneurysm LESS than 10 mm   []   History of ruptured intracranial aneurysm   []   Blood glucose LESS than 50 mg/dL (2.7 mmol/L)   []   Dural puncture within the last 7 days   []   Known GREATER than 10 cerebral microbleeds   Additional exclusions for patients with symptoms onset between 3 and 4.5 hours.  []   Age > 80.   []   On any anticoagulants regardless of INR  >>> Warfarin (Coumadin), Heparin, Enoxaparin (Lovenox), fondaparinux (Arixtra), bivalirudin (Angiomax), Argatroban, dabigatran (Pradaxa), rivaroxaban (Xarelto), or apixaban (Eliquis)   []   Severe stroke (NIHSS > 25).   []   History of BOTH diabetes and previous ischemic stroke.   []   The risks and benefits have been discussed with the patient or family related to the administration of IV Alteplase for stroke symptoms.   []   I have discussed and reviewed the patient's case and imaging with the attending prior to IV Alteplase.    Time Alteplase administered       [facility] Meds:  Scheduled-    Infusions-     PRNs- •  sodium chloride    Functional Status Prior to Current Stroke/Jennifer Score: 0    NIH Stroke Scale  Time: 2015 EST  Person Administering Scale: CHANDNI Rojo    1a  Level of consciousness: 0=alert; keenly responsive   1b. LOC questions:  0=Performs both tasks correctly   1c. LOC commands: 0=Performs both tasks correctly   2.  Best Gaze: 0=normal   3.  Visual: 0=No visual loss   4. Facial Palsy: 0=Normal symmetric movement   5a.  Motor left arm: 0=No  drift, limb holds 90 (or 45) degrees for full 10 seconds   5b.  Motor right arm: 0=No drift, limb holds 90 (or 45) degrees for full 10 seconds   6a. motor left le=No drift, limb holds 90 (or 45) degrees for full 10 seconds   6b  Motor right le=No drift, limb holds 90 (or 45) degrees for full 10 seconds   7. Limb Ataxia: 0=Absent   8.  Sensory: 0=Normal; no sensory loss   9. Best Language:  0=No aphasia, normal   10. Dysarthria: 0=Normal   11. Extinction and Inattention: 0=No abnormality    Total:   0       Results Reviewed:  MRI Brain Without Contrast    Result Date: 3/11/2021  1. No acute infarction.  2. Mild to moderate chronic small vessel ischemic disease findings somewhat advanced for patient age, however, no focal or generalized atrophy.  DICTATED:   2021 EDITED/ls :   2021         XR Chest 1 View    Result Date: 3/11/2021  Mild prominence of the perihilar lung markings likely central vascular congestion without overt edema or effusion.  DICTATED:   2021 EDITED/ls :   2021       CT Head Without Contrast Stroke Protocol    Result Date: 3/11/2021  Stable appearance of the brain with no evidence of acute intracranial abnormality.  Scan performed 4:30 PM 3/11/2021, results discussed with the stroke team in person by Neftali Mccall at 4:37 PM same day   This report was finalized on 3/11/2021 4:42 PM by Neftali Mccall.          Results for orders placed during the hospital encounter of 20    Adult Transthoracic Echo Complete W/ Cont if Necessary Per Protocol (With Agitated Saline)    Interpretation Summary  · Left ventricular systolic function is normal. Estimated left ventricular EF = 65%  · Left ventricular wall thickness is consistent with concentric hypertrophy.  · The cardiac valves are anatomically and functionally normal.  · Saline test results are negative.            Assessment/Plan:      55 yr old female with a PMH of RPCA stroke in 2020, HTN, T2DM, and HLD  that presented to the [facility] ED today with c/o left visual field cut. Symptoms resolved while in the ED. CTH negative for any acute abnormalities. MRI negative for acute infarction. D/t the unknown etiology of her previous stroke and an additional occurrence of symptoms, she will be admitted for observation and a possible KHURRAM.    Left visual field cut  -resolved  -possible TIA or recrudescence of previous stroke symptoms  -CTH negative  -MRI with no acute infarct  -NIH 0  -TIA/Ischemic stroke without thrombolytic therapy order set  -ASA  -Plavix  -Statin  -KHURRAM to assess for cardiac source of emboli  -NPO p MN  -T2DM; SSI per primary team  -KENYA; IVF per primary team  -Avoid hypotension; check orthostatics  -Depression/anxiety; continue home Effexor  -PT/OT/SLP    CHANDNI Rojo, AGACNP-BC, Stroke Navigator  March 11, 2021  18:55 EST

## 2021-03-11 NOTE — ED PROVIDER NOTES
Subjective   Pt presents with  1.5 hour history of left visual field cut.  No other complaints.  She has a previous stroke in 2020 that involved visual field cut and L side weakness, and she still has residual weakness from that but no residual vision problems.  Symptoms began today and she came in.      She has HTN, DM and previous stroke.  She takes ASA and Plavix.  She did not complete stroke workup last time, wore monitor only 4 days and never had KHURRAM.      History provided by:  Patient      Review of Systems   Constitutional: Negative for fever.   Eyes: Positive for visual disturbance.   Gastrointestinal: Negative for vomiting.   Neurological: Positive for weakness (chronic unchanged). Negative for headaches.   All other systems reviewed and are negative.      Past Medical History:   Diagnosis Date   • Depression    • Diabetes mellitus (CMS/HCC)    • Herpes    • Hypertension    • Stroke (cerebrum) (CMS/HCC)        No Known Allergies    Past Surgical History:   Procedure Laterality Date   • BACK SURGERY      disc slipped   •  SECTION     • TONSILLECTOMY AND ADENOIDECTOMY     • WISDOM TOOTH EXTRACTION         Family History   Problem Relation Age of Onset   • Thyroid disease Mother    • Breast cancer Maternal Aunt    • Hypertension Maternal Grandmother    • Diabetes Maternal Grandmother    • Stroke Maternal Grandmother    • Heart attack Maternal Grandmother    • Hypertension Maternal Grandfather    • Diabetes Maternal Grandfather    • Hypertension Paternal Grandmother    • Diabetes Paternal Grandmother    • Heart failure Paternal Grandmother    • Hypertension Paternal Grandfather    • Heart attack Paternal Grandfather        Social History     Socioeconomic History   • Marital status:      Spouse name: Not on file   • Number of children: Not on file   • Years of education: Not on file   • Highest education level: Not on file   Tobacco Use   • Smoking status: Former Smoker     Quit date:  1980     Years since quittin.2   • Smokeless tobacco: Never Used   Substance and Sexual Activity   • Alcohol use: Never   • Drug use: Not Currently     Types: Marijuana     Comment: once weekly, last use 20   • Sexual activity: Defer           Objective   Physical Exam  Vitals and nursing note reviewed.   Constitutional:       General: She is not in acute distress.     Appearance: Normal appearance. She is not ill-appearing.   HENT:      Head: Normocephalic and atraumatic.      Mouth/Throat:      Mouth: Mucous membranes are moist.   Eyes:      General: No scleral icterus.        Right eye: No discharge.         Left eye: No discharge.      Conjunctiva/sclera: Conjunctivae normal.   Cardiovascular:      Rate and Rhythm: Normal rate and regular rhythm.      Heart sounds: No murmur.   Pulmonary:      Effort: Pulmonary effort is normal. No respiratory distress.      Breath sounds: Normal breath sounds. No wheezing.   Abdominal:      General: Bowel sounds are normal. There is no distension.      Palpations: Abdomen is soft.      Tenderness: There is no abdominal tenderness. There is no guarding or rebound.   Musculoskeletal:         General: No swelling. Normal range of motion.      Cervical back: Normal range of motion and neck supple.   Skin:     General: Skin is warm and dry.      Findings: No rash.   Neurological:      General: No focal deficit present.      Mental Status: She is alert and oriented to person, place, and time. Mental status is at baseline.      Comments: Speech fluent and articulate.  No facial droop.  5/5 strength in arms and legs.  Normal .  Mentation normal.  Slight left lower visual field finger counting deficit.   Psychiatric:         Mood and Affect: Mood normal.         Behavior: Behavior normal.         Thought Content: Thought content normal.         Procedures           ED Course         Seen upon arrival, in CT scanner, with stroke team.  NIHSS =1.  Imaging negative.  MRI  negative.  Reviewed CTAs from prior, no critical disease.  Labs benign.  EKG SR.    Symptoms resolved in ED.  Stroke team wants to admit for KHURRAM and completion of stroke eval.  Patient stable on serial rechecks.  Discussed exam findings, test results so far and concerns in detail at the bedside.  Discussed need for admission for further evaluation and treatment.                                    MDM  Number of Diagnoses or Management Options     Amount and/or Complexity of Data Reviewed  Clinical lab tests: reviewed and ordered  Tests in the radiology section of CPT®: ordered and reviewed  Decide to obtain previous medical records or to obtain history from someone other than the patient: yes  Review and summarize past medical records: yes  Discuss the patient with other providers: yes  Independent visualization of images, tracings, or specimens: yes        Final diagnoses:   TIA (transient ischemic attack)            Richard Friedman MD  03/11/21 5539

## 2021-03-11 NOTE — NURSING NOTE
Stroke Navigator CODE STROKE    TIME NOTIFIED OF PATIENTS ARRIVAL 1623, TIME OF PATIENT EVALUATION 1625    HPI    Rosanne Araya is a 55 y.o. right-handed  female with known medical diagnosis of hypertension, hyperlipidemia, diabetes type 2, and recent right posterior cerebral artery stroke (11/26/20 with residual left-sided weakness) on whom I am evaluating as a Code Stroke for a possible acute stroke.     Mrs. Araya presents to our facility via private vehicle after experiencing a sudden onset of complete left hemianopsia in her left eye only approximately 1 hour prior to arrival.  She was recently admitted to our facility from 11/26/2020-12/3/2020 where she was found to have a right posterior cerebral artery stroke.  She underwent a complete stroke work-up which was inconclusive for the etiology of her stroke.  She was discharged to [facility] for inpatient rehabilitation with a NIHSS of 1 for LLE drift.  She was prescribed DAPT x 90 days and high intensity statin; all which she states compliance with.      Prior to discharge she underwent a hypercoagulable work-up and received a 30-day cardiac monitor, which she states she only wore for 4 days because it fell off.  This was negative for cardiac arrhythmias.  Neurology also recommended KHURRAM outpatient which the patient never scheduled.   She has followed up with cardiology on 2/19/2021 who has decreased her Terazosin secondary to dizziness and hypotension.      Upon my assessment the patient is alert, oriented, follows commands.  Pupils are equal and round.  She does have a plate left hemianopsia in her left eye only.  Face is symmetrical.  Speech is clear and fluent with no evidence of aphasia.  She moves all extremities bilaterally with mild drift in her left lower extremity, which she states is baseline since her previous stroke.  Sensation is intact to light touch.  Finger-to-nose and heel-to-shin are negative for ataxia.  She denies headache.   Gait not assessed.  BP upon arrival is 139/91.    Code Stroke location: ED    · Last known well: 1525    · GCS: 15  · Baseline level of function known: yes. Modified New Point: 1   · Current symptoms include; extremity weakness and visual field cut, affecting primarily the left lower extremity and left eye (temporal).  Symptoms are currently improving.   · Patient is not a candidate for thrombolytic therapy due to complete resolution of symptoms.  · Patient is not a candidate for Neuro Intervention due to symptoms inconsistent with LVO stroke.    Stroke risk factors: diabetes mellitus, hyperlipidemia, hypertension, physical inactivity and/or obesity and prior stroke.     Prior stroke history: yes  Location: right posterior cerebral artery stroke  Antiplatelet therapy: Plavix 75mg, Aspirin 81mg  Anticoagulation: none     · Imaging performed: CT head and MRI brain without contrast      NIHSS    Interval: baseline  1a. Level of Consciousness: 0-->Alert, keenly responsive  1b. LOC Questions: 0-->Answers both questions correctly  1c. LOC Commands: 0-->Performs both tasks correctly  2. Best Gaze: 0-->Normal  3. Visual: 2--> Complete hemianopia  4. Facial Palsy: 0-->Normal symmetrical movements  5a. Motor Arm, Left: 0-->No drift, limb holds 90 (or 45) degrees for full 10 secs  5b. Motor Arm, Right: 0-->No drift, limb holds 90 (or 45) degrees for full 10 secs  6a. Motor Leg, Left: 1--> Drift, leg falls by the end of the 5 sec period but does not hit the bed  6b. Motor Leg, Right: 0-->No drift, leg holds 30 degree position for full 5 secs  7. Limb Ataxia: 0-->Absent  8. Sensory: 0-->Normal, no sensory loss  9. Best Language: 0-->No aphasia, normal  10. Dysarthria: 0-->Normal  11. Extinction and Inattention (formerly Neglect): 1-->Visual, tactile, auditory, spatial, or personal inattention or extinction to bilateral simultaneous stimulation in one of the sensory modalities.    Total (NIH Stroke Scale): 4     Upon reassessment,  the patient's symptoms are back to baseline with mild left sided weakness.     PLAN    Discussed with Dr. Perdomo.  Due to patient's return to baseline we will repeat MRI brain without contrast for evaluation of new stroke.  Will also obtain CBC, CMP, and urinalysis to rule out infectious etiology which could cause recrudescence of symptoms.  He recommends overnight observation and possible KHURRAM while here.      1800:  MRI brain without contrast is negative for new vascular event.  Discussed with patient, including Dr. Perdomo's recommendations.  She has agreed to be admitted to the hospital for overnight observation.      Formal consult to follow.    Ammy Stephens RN EXTENDER/STROKE NAVIGATOR

## 2021-03-12 ENCOUNTER — READMISSION MANAGEMENT (OUTPATIENT)
Dept: CALL CENTER | Facility: HOSPITAL | Age: 56
End: 2021-03-12

## 2021-03-12 VITALS
TEMPERATURE: 97.9 F | HEIGHT: 64 IN | BODY MASS INDEX: 30.35 KG/M2 | RESPIRATION RATE: 18 BRPM | SYSTOLIC BLOOD PRESSURE: 150 MMHG | DIASTOLIC BLOOD PRESSURE: 91 MMHG | HEART RATE: 86 BPM | OXYGEN SATURATION: 98 % | WEIGHT: 177.8 LBS

## 2021-03-12 LAB
ANION GAP SERPL CALCULATED.3IONS-SCNC: 12 MMOL/L (ref 5–15)
BUN SERPL-MCNC: 21 MG/DL (ref 6–20)
BUN/CREAT SERPL: 23.3 (ref 7–25)
CALCIUM SPEC-SCNC: 8.8 MG/DL (ref 8.6–10.5)
CHLORIDE SERPL-SCNC: 108 MMOL/L (ref 98–107)
CHOLEST SERPL-MCNC: 120 MG/DL (ref 0–200)
CO2 SERPL-SCNC: 20 MMOL/L (ref 22–29)
CREAT SERPL-MCNC: 0.9 MG/DL (ref 0.57–1)
GFR SERPL CREATININE-BSD FRML MDRD: 65 ML/MIN/1.73
GLUCOSE BLDC GLUCOMTR-MCNC: 156 MG/DL (ref 70–130)
GLUCOSE BLDC GLUCOMTR-MCNC: 222 MG/DL (ref 70–130)
GLUCOSE SERPL-MCNC: 155 MG/DL (ref 65–99)
HBA1C MFR BLD: 6.8 % (ref 4.8–5.6)
HDLC SERPL-MCNC: 37 MG/DL (ref 40–60)
LDLC SERPL CALC-MCNC: 68 MG/DL (ref 0–100)
LDLC/HDLC SERPL: 1.85 {RATIO}
POTASSIUM SERPL-SCNC: 4.7 MMOL/L (ref 3.5–5.2)
SODIUM SERPL-SCNC: 140 MMOL/L (ref 136–145)
TRIGL SERPL-MCNC: 72 MG/DL (ref 0–150)
VLDLC SERPL-MCNC: 15 MG/DL (ref 5–40)

## 2021-03-12 PROCEDURE — 83036 HEMOGLOBIN GLYCOSYLATED A1C: CPT | Performed by: NURSE PRACTITIONER

## 2021-03-12 PROCEDURE — 80061 LIPID PANEL: CPT | Performed by: NURSE PRACTITIONER

## 2021-03-12 PROCEDURE — 96361 HYDRATE IV INFUSION ADD-ON: CPT

## 2021-03-12 PROCEDURE — 97165 OT EVAL LOW COMPLEX 30 MIN: CPT

## 2021-03-12 PROCEDURE — 63710000001 INSULIN LISPRO (HUMAN) PER 5 UNITS: Performed by: PHYSICIAN ASSISTANT

## 2021-03-12 PROCEDURE — G0378 HOSPITAL OBSERVATION PER HR: HCPCS

## 2021-03-12 PROCEDURE — 80048 BASIC METABOLIC PNL TOTAL CA: CPT | Performed by: INTERNAL MEDICINE

## 2021-03-12 PROCEDURE — 82962 GLUCOSE BLOOD TEST: CPT

## 2021-03-12 PROCEDURE — 99217 PR OBSERVATION CARE DISCHARGE MANAGEMENT: CPT | Performed by: INTERNAL MEDICINE

## 2021-03-12 PROCEDURE — 99214 OFFICE O/P EST MOD 30 MIN: CPT | Performed by: PSYCHIATRY & NEUROLOGY

## 2021-03-12 PROCEDURE — G0108 DIAB MANAGE TRN  PER INDIV: HCPCS

## 2021-03-12 PROCEDURE — 92523 SPEECH SOUND LANG COMPREHEN: CPT

## 2021-03-12 PROCEDURE — 97161 PT EVAL LOW COMPLEX 20 MIN: CPT

## 2021-03-12 RX ADMIN — DILTIAZEM HYDROCHLORIDE 300 MG: 120 CAPSULE, EXTENDED RELEASE ORAL at 08:40

## 2021-03-12 RX ADMIN — SODIUM CHLORIDE, PRESERVATIVE FREE 10 ML: 5 INJECTION INTRAVENOUS at 08:40

## 2021-03-12 RX ADMIN — CARVEDILOL 6.25 MG: 6.25 TABLET, FILM COATED ORAL at 08:41

## 2021-03-12 RX ADMIN — CLOPIDOGREL BISULFATE 75 MG: 75 TABLET ORAL at 08:41

## 2021-03-12 RX ADMIN — VENLAFAXINE HYDROCHLORIDE 225 MG: 75 CAPSULE, EXTENDED RELEASE ORAL at 08:40

## 2021-03-12 RX ADMIN — INSULIN LISPRO 3 UNITS: 100 INJECTION, SOLUTION INTRAVENOUS; SUBCUTANEOUS at 12:00

## 2021-03-12 RX ADMIN — ASPIRIN 81 MG CHEWABLE TABLET 81 MG: 81 TABLET CHEWABLE at 08:41

## 2021-03-12 RX ADMIN — TERAZOSIN HYDROCHLORIDE 2 MG: 2 CAPSULE ORAL at 08:40

## 2021-03-12 NOTE — PROGRESS NOTES
Discharge Planning Assessment  [facility]     Patient Name: Rosanne Araya  MRN: [id number]  Today's Date: 3/12/2021    Admit Date: 3/11/2021    Discharge Needs Assessment     Row Name 03/12/21 0918       Living Environment    Lives With  child(gena), dependent;spouse    Current Living Arrangements  home/apartment/condo    Primary Care Provided by  self    Provides Primary Care For  child(gena)    Able to Return to Prior Arrangements  yes       Transition Planning    Patient/Family Anticipates Transition to  home with family    Transportation Anticipated  family or friend will provide       Discharge Needs Assessment    Readmission Within the Last 30 Days  no previous admission in last 30 days    Equipment Currently Used at Home  none    Concerns to be Addressed  denies needs/concerns at this time    Equipment Needed After Discharge  none    Current Discharge Risk  chronically ill        Discharge Plan     Row Name 03/12/21 0919       Plan    Plan  home    Patient/Family in Agreement with Plan  yes    Plan Comments  I met with Mrs. Araya at the bedside. She lives in [facility] with her  and son. She is independent with mobility and activities of daily living. She works part time as a . She is anticipating undergoing a KHURRAM today. She denies having any discharge needs at this time.    Final Discharge Disposition Code  01 - home or self-care        Continued Care and Services - Admitted Since 3/11/2021    Coordination has not been started for this encounter.         Demographic Summary     Row Name 03/12/21 0918       General Information    General Information Comments  I confirmed that Neftali Mccain is Mrs. Araya's PCP. [employer] is her insurer.        Functional Status     Row Name 03/12/21 0918       Functional Status, IADL    Medications  independent    Meal Preparation  independent    Housekeeping  independent    Laundry  independent    Shopping  independent       Employment/     Employment Status  employed part-time    Current or Previous Occupation  education        Psychosocial    No documentation.       Abuse/Neglect    No documentation.       Legal    No documentation.       Substance Abuse    No documentation.       Patient Forms    No documentation.           Brien Connors RN

## 2021-03-12 NOTE — PLAN OF CARE
Goal Outcome Evaluation:     SLP evaluation completed. Will sign-off as no deficits identified with speech, language or cognition at this time. Please see note for further details and recommendations.

## 2021-03-12 NOTE — PROGRESS NOTES
[facility] Medicine Services  PROGRESS NOTE    Patient Name: Rosanne Araya  : 1965  MRN: [id number]    Date of Admission: 3/11/2021  Primary Care Physician: Neftali Mccain MD    Subjective   Subjective     CC:  Left eye vision loss    HPI:  Patient reports that her vision loss has resolved.  She denies any other neurological symptoms.  She denies any weakness.  Hopeful to go home later today.    ROS:  General: denies fevers or chills  CV: denies chest pain  Resp: denies shortness of breath  Abd: denies abd pain, nausea      Objective   Objective     Vital Signs:   Temp:  [98 °F (36.7 °C)-98.5 °F (36.9 °C)] 98 °F (36.7 °C)  Heart Rate:  [68-83] 70  Resp:  [15-17] 17  BP: (114-156)/(69-93) 114/69  Total (NIH Stroke Scale): 0     Physical Exam:  Constitutional: No acute distress, awake, alert, sitting on side of bed  HENT: NCAT, mucous membranes moist  Respiratory: Clear to auscultation bilaterally, respiratory effort normal   Cardiovascular: RRR, no murmurs, rubs, or gallops  Gastrointestinal: Positive bowel sounds, soft, nontender, nondistended  Musculoskeletal: No bilateral ankle edema  Psychiatric: Appropriate affect, cooperative  Neurologic: Oriented x 3, focal neurological deficits.  No facial droop, normal speech  Skin: No rashes      Results Reviewed:  Results from last 7 days   Lab Units 21  1640 21  1633   WBC 10*3/mm3 9.34  --    HEMOGLOBIN g/dL 12.4  --    HEMOGLOBIN, POC g/dL  --  12.2   HEMATOCRIT % 37.1  --    HEMATOCRIT POC %  --  36*   PLATELETS 10*3/mm3 312  --      Results from last 7 days   Lab Units 21  1706 21  1641 21  1632   SODIUM mmol/L 135*  --   --    POTASSIUM mmol/L 4.9  --   --    CHLORIDE mmol/L 103  --   --    CO2 mmol/L 22.0  --   --    BUN mg/dL 27*  --   --    CREATININE mg/dL 1.33*  --  1.40*   GLUCOSE mg/dL 90  --   --    CALCIUM mg/dL 9.3  --   --    ALT (SGPT) U/L 8 8  --    AST (SGOT) U/L 14 16  --     TROPONIN T ng/mL  --  <0.010  --      Estimated Creatinine Clearance: 50.3 mL/min (A) (by C-G formula based on SCr of 1.33 mg/dL (H)).    Microbiology Results Abnormal     Procedure Component Value - Date/Time    COVID-19,CEPHEID,JEANINE IN-HOUSE(OR EMERGENT/ADD-ON),NP SWAB IN TRANSPORT MEDIA 3-4 HR TAT - Swab, Nasopharynx [[id number]]  (Normal) Collected: 03/11/21 1853    Lab Status: Final result Specimen: Swab from Nasopharynx Updated: 03/11/21 1942     COVID19 Not Detected    Narrative:      Fact sheet for providers: https://www.fda.gov/media/294125/download     Fact sheet for patients: https://www.fda.gov/media/777250/download          Imaging Results (Last 24 Hours)     Procedure Component Value Units Date/Time    MRI Brain Without Contrast [[id number]] Collected: 03/11/21 1728     Updated: 03/11/21 1814    Narrative:      EXAMINATION: MRI BRAIN WO CONTRAST - 03/11/2021     INDICATION: Vision changes.     TECHNIQUE: Multiplanar MRI of the brain without intravenous contrast.     COMPARISON: None.     FINDINGS: No restriction on diffusion-weighted sequences. Midline  structures are symmetric without evidence of mass, mass effect or  midline shift. Ventricles and sulci within normal limits. Mild to  moderate increased signal findings in the periventricular deep white  matter suggesting likely small vessel ischemic disease of a somewhat  advanced for patient age, however, no susceptibility artifact at these  areas or elsewhere apart from mild chronic changes. Globes and orbits  retain normal T2 signal characteristics. Paranasal sinuses and mastoid  air cells demonstrate mild mucosal thickening of the maxillary sinuses  and ethmoid air cells along with a trace right mastoid effusion. No  cerebellopontine angle mass lesion. Normal signal flow voids of the  distal internal carotid and vertebrobasilar arteries. Pituitary and  sella within normal limits. Cervicomedullary junction widely patent.       Impression:      1. No  acute infarction.     2. Mild to moderate chronic small vessel ischemic disease findings  somewhat advanced for patient age, however, no focal or generalized  atrophy.     DICTATED:   03/11/2021  EDITED/ls :   03/11/2021              XR Chest 1 View [[id number]] Collected: 03/11/21 1710     Updated: 03/11/21 1730    Narrative:         EXAMINATION: XR CHEST 1 VW - 03/11/2021     INDICATION: Stroke protocol.     COMPARISON: Chest x-ray 11/26/2020     FINDINGS: Cardiac size within normal limits. Increased perihilar lung  markings right greater than left consistent with likely central vascular  congestion versus minimal peribronchial inflammatory findings. No  pneumothorax or pleural effusion. Degenerative changes of the spine.          Impression:      Mild prominence of the perihilar lung markings likely  central vascular congestion without overt edema or effusion.     DICTATED:   03/11/2021  EDITED/ls :   03/11/2021          CT Head Without Contrast Stroke Protocol [[id number]] Collected: 03/11/21 1640     Updated: 03/11/21 1645    Narrative:      EXAMINATION: CT HEAD WO CONTRAST STROKE PROTOCOL-      INDICATION: Stroke, follow up     TECHNIQUE: Axial noncontrast CT of the head with multiplanar  reconstruction     The radiation dose reduction device was turned on for each scan per the  ALARA (As Low as Reasonably Achievable) protocol.     COMPARISON: 11/26/2020     FINDINGS: Gray-white differentiation is maintained, with redemonstrated  chronic appearing right frontal centrum semiovale lacunar infarct. There  is otherwise no evidence of acute ischemia, hemorrhage, mass or mass  effect. The ventricles are normal in size and configuration. The orbits  are normal and the paranasal sinuses are grossly clear.       Impression:      Stable appearance of the brain with no evidence of acute  intracranial abnormality.     Scan performed 4:30 PM 3/11/2021, results discussed with the stroke team  in person by Neftali Mccall at  4:37 PM same day        This report was finalized on 3/11/2021 4:42 PM by Neftali Mccall.             Results for orders placed during the hospital encounter of 11/26/20    Adult Transthoracic Echo Complete W/ Cont if Necessary Per Protocol (With Agitated Saline)    Interpretation Summary  · Left ventricular systolic function is normal. Estimated left ventricular EF = 65%  · Left ventricular wall thickness is consistent with concentric hypertrophy.  · The cardiac valves are anatomically and functionally normal.  · Saline test results are negative.      I have reviewed the medications:  Scheduled Meds:aspirin, 81 mg, Oral, Daily   Or  aspirin, 300 mg, Rectal, Daily  atorvastatin, 80 mg, Oral, Nightly  carvedilol, 6.25 mg, Oral, BID With Meals  clopidogrel, 75 mg, Oral, Daily  dilTIAZem CD, 300 mg, Oral, Daily  insulin lispro, 0-7 Units, Subcutaneous, TID AC  sodium chloride, 10 mL, Intravenous, Q12H  terazosin, 2 mg, Oral, Q12H  venlafaxine XR, 225 mg, Oral, Daily      Continuous Infusions:sodium chloride, 75 mL/hr, Last Rate: 75 mL/hr (03/12/21 0650)      PRN Meds:.dextrose  •  dextrose  •  glucagon (human recombinant)  •  sodium chloride  •  sodium chloride    Assessment/Plan   Assessment & Plan     Active Hospital Problems    Diagnosis  POA   • **TIA (transient ischemic attack) [G45.9]  Yes   • Anxiety and depression [F41.9, F32.9]  Yes   • KENYA (acute kidney injury) (CMS/[facility]) [N17.9]  Yes   • Dyslipidemia [E78.5]  Yes   • Essential hypertension [I10]  Yes   • Uncontrolled type 2 diabetes mellitus with hyperglycemia (CMS/HCC) [E11.65]  Yes      Resolved Hospital Problems   No resolved problems to display.     Rosanne Araya is a 55 y.o. 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 She presents today with complaints of acute left sided vision loss at around 1500 with complete resolution of  symptoms at 1630.     TIA  - history of recent CVA, currently on DAPT and high intensity statin. Continue  - MRI negative for acute infarct, symptoms resolved  -Neurology was consulted and are following.  Recommend KHURRAM.  -Order KHURRAM per neurology recs  -Continue ASA, Plavix and statin  - PT/OT/SLP     KENYA:  - baseline creatinine 0.06. increased to 1.33 on presentation  -Status post IV fluids  - suspect Jardiance  - hold and avoid nephrotoxins  -BMP pending this morning     DM2:  - no insulin dependent. Currently controlled. A1c 6.6 in February 20201  - Hold oral hypoglycemics for now  - scheduled accu checks with [facility]      Hypertension:  -Continue cardizem, coreg, terazosin  - holding losartan and spironolactone per KENYA. Resume as tolerated     Anxiety/depression:  - continue venlafaxine     Dizziness  - suspect orthostasis   -Status post IV fluids  - obtain orthostatic VS in AM     DVT prophylaxis: mechanical         Disposition: I expect the patient to be discharged TBD.    CODE STATUS:   Code Status and Medical Interventions:   Ordered at: 03/11/21 2055     Level Of Support Discussed With:    Patient     Code Status:    CPR     Medical Interventions (Level of Support Prior to Arrest):    Full     This patient's problems and plans were partially entered by my partner and updated as appropriate by me 03/12/21. Today is my first day evaluating this patients active medical problems. I Personally reviewed chart and adjusted note to reflect daily changes in management/clinical condition      Sandra Whiteside MD  03/12/21

## 2021-03-12 NOTE — THERAPY EVALUATION
Acute Care - Speech Language Pathology Initial Evaluation  [facility]   Cognitive-Communication Evaluation       Patient Name: Rosanne Araya  : 1965  MRN: [id number]  Today's Date: 3/12/2021               Admit Date: 3/11/2021     Visit Dx:    ICD-10-CM ICD-9-CM   1. TIA (transient ischemic attack)  G45.9 435.9     Patient Active Problem List   Diagnosis   • Uncontrolled type 2 diabetes mellitus with hyperglycemia (CMS/[facility])   • Essential hypertension   • Herpes labialis   • Family history of thyroid disease in mother   • Acute CVA (cerebrovascular accident) (CMS/HCC)   • Nausea   • Hypokalemia   • Dyslipidemia   • Microalbuminuria due to type 2 diabetes mellitus (CMS/HCC)   • TIA (transient ischemic attack)   • Anxiety and depression   • KENYA (acute kidney injury) (CMS/[facility])     Past Medical History:   Diagnosis Date   • Depression    • Diabetes mellitus (CMS/HCC)    • Herpes    • Hypertension    • Stroke (cerebrum) (CMS/[facility])      Past Surgical History:   Procedure Laterality Date   • BACK SURGERY      disc slipped   •  SECTION     • TONSILLECTOMY AND ADENOIDECTOMY     • WISDOM TOOTH EXTRACTION          SLP EVALUATION (last 72 hours)      SLP SLC Evaluation     Row Name 21 1100                   Communication Assessment/Intervention    Document Type  discharge evaluation/summary  -        Subjective Information  no complaints  -        Patient Observations  alert;cooperative;agree to therapy  -        Patient/Family/Caregiver Comments/Observations  No family present  -        Care Plan Review  evaluation/treatment results reviewed;care plan/treatment goals reviewed;risks/benefits reviewed;current/potential barriers reviewed;patient/other agree to care plan  -        Patient Effort  excellent  -           General Information    Patient Profile Reviewed  yes  -        Pertinent History Of Current Problem  Admitted on stroke pathway with sudden L vision loss which has since  resolved. MRI: No acute findings. Hx prior CVA. SLC evaluation completed per stroke protocol.   -CH        Precautions/Limitations, Vision  WFL;for purposes of eval  -CH        Precautions/Limitations, Hearing  WFL;for purposes of eval  -CH        Patient Level of Education  RN  -CH        Prior Level of Function-Communication  WFL  -        Plans/Goals Discussed with  patient;agreed upon  -        Barriers to Rehab  none identified  -CH           Pain    Additional Documentation  Pain Scale: FACES Pre/Post-Treatment (Group)  -           Pain Scale: Numbers Pre/Post-Treatment    Pretreatment Pain Rating  0/10 - no pain  -CH        Posttreatment Pain Rating  0/10 - no pain  -CH           Pain Scale: FACES Pre/Post-Treatment    Pain: FACES Scale, Pretreatment  0-->no hurt  -CH        Posttreatment Pain Rating  0-->no hurt  -CH           Comprehension Assessment/Intervention    Comprehension Assessment/Intervention  Auditory Comprehension;Reading Comprehension  -           Auditory Comprehension Assessment/Intervention    Auditory Comprehension (Communication)  WNL  -CH           Reading Comprehension Assessment/Intervention    Reading Comprehension (Communication)  WNL  -        Functional Reading Tasks  WNL  -           Expression Assessment/Intervention    Expression Assessment/Intervention  verbal expression;graphic expression  -           Verbal Expression Assessment/Intervention    Verbal Expression  WNL  -        Automatic Speech (Communication)  response to greeting;counting 1-20;WNL  -        Repetition  sentences;WNL  -        Phrase Completion  unpredictable;WNL  -        Responsive Naming  complex;WNL  -        Confrontational Naming  low frequency;WNL  -        Spontaneous/Functional Words  complex;WNL  -        Sentence Formulation  complex;WNL  -        Conversational Discourse/Fluency  WNL  -           Graphic Expression Assessment/Intervention    Graphic Expression  WNL   -        Biographical Information  name;address;[facility]           Oral Motor Structure and Function    Oral Motor Structure and Function  WN  -           Motor Speech Assessment/Intervention    Motor Speech Function  [facility]           Cursory Voice Assessment/Intervention    Quality and Resonance (Voice)  WNL  -CH           Cognitive Assessment Intervention- SLP    Cognitive Function (Cognition)  [facility]        Orientation Status (Cognition)  awareness of basic personal information;person;place;time;situation;[facility]  -        Memory (Cognitive)  simple;functional;immediate;short-term;long-term;delayed;[facility]  -        Attention (Cognitive)  selective;sustained;alternating;divided;attention to detail;[facility]  -        Thought Organization (Cognitive)  concrete divergent;abstract divergent;concrete convergent;abstract convergent;verbal sequencing;[facility]        Reasoning (Cognitive)  simple;deductive;[facility]        Problem Solving (Cognitive)  simple;multifactorial;temporal;[facility]  -        Functional Math (Cognitive)  simple;word problems;[facility]  -        Executive Function (Cognition)  [facility]        Pragmatics (Communication)  [facility]           SLP Clinical Impressions    SLP Diagnosis  No deficits identified with speech, language or cognition at this time  -        SLC Criteria for Skilled Therapy Interventions Met  no problems identified which require skilled intervention  [facility]        Functional Impact  no impact on function  -           Recommendations    Therapy Frequency (SLP SLC)  evaluation only  -        Anticipated Discharge Disposition (SLP)  home  -           SLP Discharge Summary    Discharge Destination  home  -        Discharge Diagnostic Statement  No deficits identified with speech, language or cognition at this time  -        Progress Toward Achieving Short/long Term Goals  discharge on same date as initial evaluation  -        Reason for Discharge  all goals and outcomes met, no  further needs identified  -          User Key  (r) = Recorded By, (t) = Taken By, (c) = Cosigned By    Initials Name Effective Dates    Rosi Hayden MS CCC-SLP 12/04/20 -              EDUCATION  The patient has been educated in the following areas:     Cognitive Impairment Communication Impairment.    SLP Recommendation and Plan  SLP Diagnosis: No deficits identified with speech, language or cognition at this time  Reason for Discharge: all goals and outcomes met, no further needs identified     SLC Criteria for Skilled Therapy Interventions Met: no problems identified which require skilled intervention  Anticipated Discharge Disposition (SLP): home                                               Time Calculation:     Time Calculation- SLP     Row Name 03/12/21 1135             Time Calculation- SLP    SLP Start Time  1100  -      SLP Received On  03/12/21  -        User Key  (r) = Recorded By, (t) = Taken By, (c) = Cosigned By    Initials Name Provider Type    Rosi Hayden MS CCC-SLP Speech and Language Pathologist          Therapy Charges for Today     Code Description Service Date Service Provider Modifiers Qty    [id number] HC ST EVAL SPEECH AND PROD W LANG  3 3/12/2021 Rosi Klein MS CCC-SLP GN 1                     Rosi Klein MS CCC-SLP  3/12/2021     Patient was not wearing a face mask and did not exhibit coughing during this therapy encounter.  Procedure performed was aerosolizing, did not involve close contact (within 6 feet for at least 15 minutes or longer), and did not involve contact with infectious secretions or specimens.  Therapist used appropriate personal protective equipment including gloves, standard procedure mask and eye protection.  Appropriate PPE was worn during the entire therapy session.  Hand hygiene was completed before and after therapy session.

## 2021-03-12 NOTE — DISCHARGE SUMMARY
[facility] Medicine Services  DISCHARGE SUMMARY    Patient Name: Rosanne Araya  : 1965  MRN: [id number]    Date of Admission: 3/11/2021  4:23 PM  Date of Discharge: 3/12/2021  Primary Care Physician: Neftali Mccain MD    Consults     Date and Time Order Name Status Description    3/11/2021  4:23 PM Inpatient Neurology Consult Stroke Completed           Hospital Course     Presenting Problem:   TIA (transient ischemic attack) [G45.9]    Active Hospital Problems    Diagnosis  POA   • **TIA (transient ischemic attack) [G45.9]  Yes     Priority: High   • Anxiety and depression [F41.9, F32.9]  Yes   • KENYA (acute kidney injury) (CMS/HCC) [N17.9]  Yes   • Dyslipidemia [E78.5]  Yes   • Essential hypertension [I10]  Yes   • Uncontrolled type 2 diabetes mellitus with hyperglycemia (CMS/HCC) [E11.65]  Yes      Resolved Hospital Problems   No resolved problems to display.          Rosanne Araya is a 55 y.o. 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 She presents today with complaints of acute left sided vision loss at around 1500 with complete resolution of symptoms at 1630.  MRI was obtained and was negative for acute infarct.  Symptoms resolved.  Neurology was consulted and recommended that she continue DAPT and high intensity statin.  She is appropriate for discharge as her symptoms have resolved.     To have an KENYA on presentation with a creatinine of 1.33.  He was noted to be dizzy so suspect she was likely fluid deplete.  Her losartan and spironolactone were initially held.  Improved to baseline with IV fluids.  Recommend that she hold her spironolactone for an additional 3 days given her dehydration and then resume.    Patient can follow-up with her PCP in 1 to 2 weeks.  She is appropriate for discharge at this time.         Discharge Follow Up  Recommendations for outpatient labs/diagnostics:  Follow-up with PCP in 1 to 2 weeks      Day of Discharge     Vital Signs:   Temp:  [98 °F (36.7 °C)-98.5 °F (36.9 °C)] 98 °F (36.7 °C)  Heart Rate:  [68-83] 70  Resp:  [15-17] 17  BP: (114-156)/(69-93) 114/69  Total (NIH Stroke Scale): 0     Physical Exam:  Constitutional: No acute distress, awake, alert, sitting on side of bed  HENT: NCAT, mucous membranes moist  Respiratory: Clear to auscultation bilaterally, respiratory effort normal   Cardiovascular: RRR, no murmurs, rubs, or gallops  Gastrointestinal: Positive bowel sounds, soft, nontender, nondistended  Musculoskeletal: No bilateral ankle edema  Psychiatric: Appropriate affect, cooperative  Neurologic: Oriented x 3, focal neurological deficits.  No facial droop, normal speech  Skin: No rashes    Pertinent  and/or Most Recent Results     LAB RESULTS:      Lab 03/11/21  1641 03/11/21  1640 03/11/21  1633   WBC  --  9.34  --    HEMOGLOBIN  --  12.4  --    HEMOGLOBIN, POC  --   --  12.2   HEMATOCRIT  --  37.1  --    HEMATOCRIT POC  --   --  36*   PLATELETS  --  312  --    NEUTROS ABS  --  5.13  --    IMMATURE GRANS (ABS)  --  0.02  --    LYMPHS ABS  --  3.04  --    MONOS ABS  --  0.73  --    EOS ABS  --  0.38  --    MCV  --  91.6  --    APTT 30.3*  --   --          Lab 03/12/21  0805 03/11/21  1706 03/11/21  1632   SODIUM 140 135*  --    POTASSIUM 4.7 4.9  --    CHLORIDE 108* 103  --    CO2 20.0* 22.0  --    ANION GAP 12.0 10.0  --    BUN 21* 27*  --    CREATININE 0.90 1.33* 1.40*   GLUCOSE 155* 90  --    CALCIUM 8.8 9.3  --    HEMOGLOBIN A1C 6.80*  --   --          Lab 03/11/21  1706 03/11/21  1641   TOTAL PROTEIN 7.2  --    ALBUMIN 4.30  --    GLOBULIN 2.9  --    ALT (SGPT) 8 8   AST (SGOT) 14 16   BILIRUBIN 0.3  --    ALK PHOS 70  --          Lab 03/11/21  1641   TROPONIN T <0.010         Lab 03/12/21  0805   CHOLESTEROL 120   LDL CHOL 68   HDL CHOL 37*   TRIGLYCERIDES 72             Lab 03/11/21  1633   PH,  ARTERIAL 7.36     Brief Urine Lab Results  (Last result in the past 365 days)      Color   Clarity   Blood   Leuk Est   Nitrite   Protein   CREAT   Urine HCG        03/11/21 1732 Yellow Clear Moderate (2+) Trace Negative Negative             Microbiology Results (last 10 days)     Procedure Component Value - Date/Time    COVID-19,CEPHEID,[facility] IN-HOUSE(OR EMERGENT/ADD-ON),NP SWAB IN TRANSPORT MEDIA 3-4 HR TAT - Swab, Nasopharynx [[id number]]  (Normal) Collected: 03/11/21 1853    Lab Status: Final result Specimen: Swab from Nasopharynx Updated: 03/11/21 1942     COVID19 Not Detected    Narrative:      Fact sheet for providers: https://www.fda.gov/media/733293/download     Fact sheet for patients: https://www.fda.gov/media/922463/download    Urine Culture - Urine, Urine, Clean Catch [[id number]]  (Normal) Collected: 03/11/21 1732    Lab Status: Preliminary result Specimen: Urine, Clean Catch Updated: 03/12/21 1021     Urine Culture No growth          MRI Brain Without Contrast    Result Date: 3/11/2021  EXAMINATION: MRI BRAIN WO CONTRAST - 03/11/2021  INDICATION: Vision changes.  TECHNIQUE: Multiplanar MRI of the brain without intravenous contrast.  COMPARISON: None.  FINDINGS: No restriction on diffusion-weighted sequences. Midline structures are symmetric without evidence of mass, mass effect or midline shift. Ventricles and sulci within normal limits. Mild to moderate increased signal findings in the periventricular deep white matter suggesting likely small vessel ischemic disease of a somewhat advanced for patient age, however, no susceptibility artifact at these areas or elsewhere apart from mild chronic changes. Globes and orbits retain normal T2 signal characteristics. Paranasal sinuses and mastoid air cells demonstrate mild mucosal thickening of the maxillary sinuses and ethmoid air cells along with a trace right mastoid effusion. No cerebellopontine angle mass lesion. Normal signal flow voids of the distal  internal carotid and vertebrobasilar arteries. Pituitary and sella within normal limits. Cervicomedullary junction widely patent.      1. No acute infarction.  2. Mild to moderate chronic small vessel ischemic disease findings somewhat advanced for patient age, however, no focal or generalized atrophy.  DICTATED:   03/11/2021 EDITED/ls :   03/11/2021         XR Chest 1 View    Result Date: 3/11/2021   EXAMINATION: XR CHEST 1 VW - 03/11/2021  INDICATION: Stroke protocol.  COMPARISON: Chest x-ray 11/26/2020  FINDINGS: Cardiac size within normal limits. Increased perihilar lung markings right greater than left consistent with likely central vascular congestion versus minimal peribronchial inflammatory findings. No pneumothorax or pleural effusion. Degenerative changes of the spine.        Mild prominence of the perihilar lung markings likely central vascular congestion without overt edema or effusion.  DICTATED:   03/11/2021 EDITED/ls :   03/11/2021       CT Head Without Contrast Stroke Protocol    Result Date: 3/11/2021  EXAMINATION: CT HEAD WO CONTRAST STROKE PROTOCOL-  INDICATION: Stroke, follow up  TECHNIQUE: Axial noncontrast CT of the head with multiplanar reconstruction  The radiation dose reduction device was turned on for each scan per the ALARA (As Low as Reasonably Achievable) protocol.  COMPARISON: 11/26/2020  FINDINGS: Gray-white differentiation is maintained, with redemonstrated chronic appearing right frontal centrum semiovale lacunar infarct. There is otherwise no evidence of acute ischemia, hemorrhage, mass or mass effect. The ventricles are normal in size and configuration. The orbits are normal and the paranasal sinuses are grossly clear.      Stable appearance of the brain with no evidence of acute intracranial abnormality.  Scan performed 4:30 PM 3/11/2021, results discussed with the stroke team in person by Neftali Mccall at 4:37 PM same day   This report was finalized on 3/11/2021 4:42 PM by  Neftali Mccall.        Results for orders placed during the hospital encounter of 11/26/20    Doppler Transcranial Complete CAR    Interpretation Summary  1.  Normal mean flow velocities of bilateral MCA, terminal ICA.  2.  No Doppler evidence suggestive of PFO      Results for orders placed during the hospital encounter of 11/26/20    Doppler Transcranial Complete CAR    Interpretation Summary  1.  Normal mean flow velocities of bilateral MCA, terminal ICA.  2.  No Doppler evidence suggestive of PFO      Results for orders placed during the hospital encounter of 11/26/20    Adult Transthoracic Echo Complete W/ Cont if Necessary Per Protocol (With Agitated Saline)    Interpretation Summary  · Left ventricular systolic function is normal. Estimated left ventricular EF = 65%  · Left ventricular wall thickness is consistent with concentric hypertrophy.  · The cardiac valves are anatomically and functionally normal.  · Saline test results are negative.      Plan for Follow-up of Pending Labs/Results:   Pending Labs     Order Current Status    Urine Culture - Urine, Urine, Clean Catch Preliminary result        Discharge Details        Discharge Medications      Changes to Medications      Instructions Start Date   carvedilol 6.25 MG tablet  Commonly known as: COREG  What changed: how much to take   12.5 mg, Oral, 2 Times Daily With Meals         Continue These Medications      Instructions Start Date   aspirin 81 MG chewable tablet   81 mg, Oral, Daily      atorvastatin 80 MG tablet  Commonly known as: LIPITOR   80 mg, Oral, Nightly      clopidogrel 75 MG tablet  Commonly known as: PLAVIX   75 mg, Oral, Daily      dilTIAZem  MG 24 hr capsule  Commonly known as: CARDIZEM CD   300 mg, Oral, Daily      glipizide 10 MG 24 hr tablet  Commonly known as: GLUCOTROL XL   Take 10 mg daily in the morning      glucose blood test strip   Check BS BID      glucose monitor monitoring kit   Use BID to check BS      Jardiance 10  MG tablet  Generic drug: Empagliflozin   10 mg, Oral, Daily      Lancets misc   Check BS BID      losartan 100 MG tablet  Commonly known as: COZAAR   100 mg, Oral, Daily      metFORMIN 1000 MG tablet  Commonly known as: GLUCOPHAGE   1,000 mg, Oral, 2 Times Daily With Meals      terazosin 2 MG capsule  Commonly known as: HYTRIN   2 mg, Oral, 2 times daily      venlafaxine XR 75 MG 24 hr capsule  Commonly known as: EFFEXOR-XR   225 mg, Oral, Daily         Stop These Medications    spironolactone 25 MG tablet  Commonly known as: ALDACTONE            No Known Allergies      Discharge Disposition:  Home or Self Care    Diet:  Hospital:  Diet Order   Procedures   • Diet Regular; Consistent Carbohydrate       Activity:  Activity Instructions     Activity as Tolerated            Restrictions or Other Recommendations:         CODE STATUS:    Code Status and Medical Interventions:   Ordered at: 03/11/21 2055     Level Of Support Discussed With:    Patient     Code Status:    CPR     Medical Interventions (Level of Support Prior to Arrest):    Full       Future Appointments   Date Time Provider Department Center   5/25/2021  4:30 PM Addie Mckeon PA-C [facility] None   7/7/2021 10:30 AM Neftali Mccain MD [facility]       Additional Instructions for the Follow-ups that You Need to Schedule     Discharge Follow-up with PCP   As directed       Currently Documented PCP:    Neftali Mccain MD    PCP Phone Number:    [phone]     Follow Up Details: 1 week                     Sandra Whiteside MD  03/12/21      Time Spent on Discharge:  I spent  45  minutes on this discharge activity which included: face-to-face encounter with the patient, reviewing the data in the system, coordination of the care with the nursing staff as well as consultants, documentation, and entering orders.

## 2021-03-12 NOTE — PLAN OF CARE
Goal Outcome Evaluation:  Plan of Care Reviewed With: patient     Outcome Summary: Pt does not exhibit any functional deficits. Pt with residual L shoulder weakness, vision WFL. No further OT warranted at this time.

## 2021-03-12 NOTE — THERAPY DISCHARGE NOTE
Acute Care - Occupational Therapy Discharge  [facility]    Patient Name: Rosanne Araya  : 1965    MRN: [id number]                              Today's Date: 3/12/2021       Admit Date: 3/11/2021    Visit Dx:     ICD-10-CM ICD-9-CM   1. TIA (transient ischemic attack)  G45.9 435.9     Patient Active Problem List   Diagnosis   • Uncontrolled type 2 diabetes mellitus with hyperglycemia (CMS/[facility])   • Essential hypertension   • Herpes labialis   • Family history of thyroid disease in mother   • Acute CVA (cerebrovascular accident) (CMS/HCC)   • Nausea   • Hypokalemia   • Dyslipidemia   • Microalbuminuria due to type 2 diabetes mellitus (CMS/HCC)   • TIA (transient ischemic attack)   • Anxiety and depression   • KENYA (acute kidney injury) (CMS/[facility])     Past Medical History:   Diagnosis Date   • Depression    • Diabetes mellitus (CMS/HCC)    • Herpes    • Hypertension    • Stroke (cerebrum) (CMS/[facility])      Past Surgical History:   Procedure Laterality Date   • BACK SURGERY      disc slipped   •  SECTION     • TONSILLECTOMY AND ADENOIDECTOMY     • WISDOM TOOTH EXTRACTION       General Information     Row Name 2136          OT Time and Intention    Document Type  discharge evaluation/summary  -AN     Mode of Treatment  occupational therapy  -AN     Row Name 21          General Information    Patient Profile Reviewed  yes  -AN     Prior Level of Function  independent:;all household mobility;community mobility;gait;transfer;bed mobility;ADL's;home management;driving;shopping;work  -AN     Existing Precautions/Restrictions  no known precautions/restrictions  -AN     Barriers to Rehab  none identified  -AN     Row Name 21          Living Environment    Lives With  child(gena), dependent;spouse  -AN     Row Name 2136          Cognition    Orientation Status (Cognition)  oriented x 4  -AN       User Key  (r) = Recorded By, (t) = Taken By, (c) = Cosigned By     Initials Name Provider Type    AN Adela Andrew OT Occupational Therapist        Mobility/ADL's     Row Name 03/12/21 0939          Bed Mobility    Bed Mobility  bed mobility (all) activities  -AN     All Activities, [place] (Bed Mobility)  independent  -AN     Row Name 03/12/21 0939          Transfers    Sit-Stand [place] (Transfers)  independent  -AN     Row Name 03/12/21 0939          Functional Mobility    Functional Mobility- Ind. Level  independent  -AN     Functional Mobility-Distance (Feet)  20  -AN     Row Name 03/12/21 0939          Activities of Daily Living    BADL Assessment/Intervention  lower body dressing;bathing  -AN     Row Name 03/12/21 0939          Lower Body Dressing Assessment/Training    [place] Level (Lower Body Dressing)  don;doff;socks;independent  -AN     Row Name 03/12/21 0939          Bathing Assessment/Intervention    [place] Level (Bathing)  modified independence  -AN     Comment (Bathing)  simulated shower standing  -AN       User Key  (r) = Recorded By, (t) = Taken By, (c) = Cosigned By    Initials Name Provider Type    AN Adela Andrew OT Occupational Therapist        Obj/Interventions     Row Name 03/12/21 0940          Sensory Assessment (Somatosensory)    Sensory Assessment (Somatosensory)  sensation intact  -AN     Row Name 03/12/21 0940          Sensory Interventions    Comment, Sensory Intervention  WFL finger to nose, opposition, function  -AN     Row Name 03/12/21 0940          Vision Assessment/Intervention    Visual Impairment/Limitations  WFL;corrective lenses full-time  -AN     Row Name 03/12/21 0940          Range of Motion Comprehensive    General Range of Motion  no range of motion deficits identified  -AN     Row Name 03/12/21 0940          Strength Comprehensive (MMT)    Comment, General Manual Muscle Testing (MMT) Assessment  residual L UE weakness shoulder, otherwise WFL  -AN     Row Name 03/12/21 0940          Balance    Dynamic Sitting  Balance  WNL  -AN     Dynamic Standing Balance  WNL  -AN     Balance Interventions  occupation based/functional task  -AN       User Key  (r) = Recorded By, (t) = Taken By, (c) = Cosigned By    Initials Name Provider Type    Adela Cobb OT Occupational Therapist        Goals/Plan    No documentation.       Clinical Impression     Row Name 03/12/21 0941          Pain Scale: Numbers Pre/Post-Treatment    Pretreatment Pain Rating  0/10 - no pain  -AN     Posttreatment Pain Rating  0/10 - no pain  -AN     Row Name 03/12/21 0941          Plan of Care Review    Plan of Care Reviewed With  patient  -AN     Outcome Summary  Pt does not exhibit any functional deficits. Pt with residual L shoulder weakness, vision WFL. No further OT warranted at this time.  -AN     Row Name 03/12/21 0941          Therapy Assessment/Plan (OT)    Patient/Family Therapy Goal Statement (OT)  agreeable to eval and results  -AN     Criteria for Skilled Therapeutic Interventions Met (OT)  no  -AN     Therapy Frequency (OT)  evaluation only  -AN     Row Name 03/12/21 0941          Therapy Plan Review/Discharge Plan (OT)    Anticipated Discharge Disposition (OT)  home  -AN     Row Name 03/12/21 0941          Vital Signs    Pre Systolic BP Rehab  117  -AN     Pre Treatment Diastolic BP  90  -AN     Row Name 03/12/21 0941          Positioning and Restraints    Pre-Treatment Position  in bed  -AN     Post Treatment Position  bed  -AN     In Bed  sitting;encouraged to call for assist;call light within reach  -AN       User Key  (r) = Recorded By, (t) = Taken By, (c) = Cosigned By    Initials Name Provider Type    Adela Cobb OT Occupational Therapist        Outcome Measures     Row Name 03/12/21 0943          How much help from another is currently needed...    Putting on and taking off regular lower body clothing?  4  -AN     Bathing (including washing, rinsing, and drying)  4  -AN     Toileting (which includes using toilet bed pan or  urinal)  4  -AN     Putting on and taking off regular upper body clothing  4  -AN     Taking care of personal grooming (such as brushing teeth)  4  -AN     Eating meals  4  -AN     AM-PAC 6 Clicks Score (OT)  24  -AN     Row Name 03/12/21 0943          Modified Jennifer Scale    Modified Jennifer Scale  0 - No Symptoms at all.  -AN     Row Name 03/12/21 0943          Functional Assessment    Outcome Measure Options  AM-PAC 6 Clicks Daily Activity (OT)  -AN       User Key  (r) = Recorded By, (t) = Taken By, (c) = Cosigned By    Initials Name Provider Type    Adela Cobb OT Occupational Therapist        Occupational Therapy Education                 Title: PT OT SLP Therapies (In Progress)     Topic: Occupational Therapy (Not Started)     Point: ADL training (Not Started)     Description:   Instruct learner(s) on proper safety adaptation and remediation techniques during self care or transfers.   Instruct in proper use of assistive devices.              Learner Progress:  Not documented in this visit.          Point: Home exercise program (Not Started)     Description:   Instruct learner(s) on appropriate technique for monitoring, assisting and/or progressing therapeutic exercises/activities.              Learner Progress:  Not documented in this visit.          Point: Precautions (Not Started)     Description:   Instruct learner(s) on prescribed precautions during self-care and functional transfers.              Learner Progress:  Not documented in this visit.          Point: Body mechanics (Not Started)     Description:   Instruct learner(s) on proper positioning and spine alignment during self-care, functional mobility activities and/or exercises.              Learner Progress:  Not documented in this visit.                          OT Recommendation and Plan  Retired Outcome Summary/Treatment Plan (OT)  Anticipated Discharge Disposition (OT): home  Therapy Frequency (OT): evaluation only  Plan of Care  Review  Plan of Care Reviewed With: patient  Outcome Summary: Pt does not exhibit any functional deficits. Pt with residual L shoulder weakness, vision WFL. No further OT warranted at this time.  Plan of Care Reviewed With: patient  Outcome Summary: Pt does not exhibit any functional deficits. Pt with residual L shoulder weakness, vision WFL. No further OT warranted at this time.     Time Calculation:   Time Calculation- OT     Row Name 03/12/21 0943             Time Calculation- OT    OT Start Time  0910  -AN      Total Timed Code Minutes- OT  0 minute(s)  -AN      OT Received On  03/12/21  -AN        User Key  (r) = Recorded By, (t) = Taken By, (c) = Cosigned By    Initials Name Provider Type    AN Adela Andrew OT Occupational Therapist        Therapy Charges for Today     Code Description Service Date Service Provider Modifiers Qty    [id number]  OT EVAL LOW COMPLEXITY 3 3/12/2021 Adela Andrew OT GO 1               Adela ARRINGTON. RHYS Andrew  3/12/2021

## 2021-03-12 NOTE — PLAN OF CARE
Problem: Adult Inpatient Plan of Care  Goal: Plan of Care Review  Recent Flowsheet Documentation  Taken 3/12/2021 7747 by Katherine Friedman PT  Plan of Care Reviewed With: patient  Outcome Summary: GOALS NOT ESTABLISHED AS PT IS AT BASELINE WITH FUNCTIONAL MOBILITY. NO DEFICITS IN STRENGTH, BALANCE, ROM, OR COORDINATION NOTED. PT ABLE TO READ SIGNS IN [facility] ACCURATELY ON R/L AND REPORTS ALL SYMPTOMS HAVE RESOLVED. PT SCHEDULED FOR [facility] LATER TODAY., WILL D/C INPT P.T. AND RECOMMEND HOME AT D/C FROM HOSPITAL.

## 2021-03-12 NOTE — PROGRESS NOTES
Stroke Progress Note       Chief Complaint: Visual changes on the left side of her vision    Subjective    Subjective     Subjective:  No acute issues overnight, patient feels back to herself.    Review of Systems   Constitutional: Negative.    HENT: Negative.    Eyes: Positive for visual disturbance.   Respiratory: Negative.    Cardiovascular: Negative.    Gastrointestinal: Negative.    Endocrine: Negative.    Genitourinary: Negative.    Musculoskeletal: Negative.    Skin: Negative.    Allergic/Immunologic: Negative.    Psychiatric/Behavioral: Negative.             Objective    Objective      Temp:  [97.9 °F (36.6 °C)-98.5 °F (36.9 °C)] 97.9 °F (36.6 °C)  Heart Rate:  [68-86] 86  Resp:  [15-18] 18  BP: (114-156)/(69-93) 150/91        Neurological Exam  Mental Status  Awake, alert and oriented to person, place and time.Alert. Speech is normal. Language is fluent with no aphasia. Attention and concentration are normal. Fund of knowledge is appropriate for level of education.    Cranial Nerves  CN II: Visual fields full to confrontation.  CN III, IV, VI: Extraocular movements intact bilaterally. Pupils equal round and reactive to light bilaterally.  CN V: Facial sensation is normal.  CN VII: Full and symmetric facial movement.  CN VIII: Equal hearing bilaterally.  CN IX, X: Palate elevates symmetrically  CN XI: Shoulder shrug strength is normal.  CN XII: Tongue midline without atrophy or fasciculations.    Motor  Normal muscle bulk throughout. No fasciculations present. Normal muscle tone.  No focal weakness in any extremities.    Sensory  Light touch is normal in upper and lower extremities.     Reflexes  Deep tendon reflexes are 2+ and symmetric in all four extremities with downgoing toes bilaterally.    Coordination  No dysmetria.    Gait  Not assessed.      Physical Exam  Vitals and nursing note reviewed.   Constitutional:       Appearance: Normal appearance.   HENT:      Head: Normocephalic and atraumatic.       Mouth/Throat:      Mouth: Mucous membranes are moist.      Pharynx: Oropharynx is clear.   Eyes:      Extraocular Movements: Extraocular movements intact.      Conjunctiva/sclera: Conjunctivae normal.      Pupils: Pupils are equal, round, and reactive to light.   Cardiovascular:      Rate and Rhythm: Normal rate and regular rhythm.   Pulmonary:      Effort: Pulmonary effort is normal. No respiratory distress.   Musculoskeletal:      Cervical back: Normal range of motion and neck supple.   Neurological:      Mental Status: She is alert.      Deep Tendon Reflexes: Reflexes are normal and symmetric.   Psychiatric:         Mood and Affect: Mood normal.         Speech: Speech normal.         Behavior: Behavior normal.         Results Review:    I reviewed the patient's new clinical results.  MRI brain shows no acute changes, old right thalamic stroke, mild to moderate white matter disease, no hemorrhage  CT angiogram of head and neck from November 2020 had showed right PCA stenosis versus occlusion, otherwise okay  2D echocardiogram in November 2020 was also reviewed  Her lab work from last admission was reviewed as well  Her BUNs/creatinine at this time is 27/1.33, UA shows trace leukocytes and negative nitrite.    Results for orders placed during the hospital encounter of 11/26/20    Adult Transthoracic Echo Complete W/ Cont if Necessary Per Protocol (With Agitated Saline)    Interpretation Summary  · Left ventricular systolic function is normal. Estimated left ventricular EF = 65%  · Left ventricular wall thickness is consistent with concentric hypertrophy.  · The cardiac valves are anatomically and functionally normal.  · Saline test results are negative.            Assessment/Plan     Assessment/Plan:  55-year-old right-handed white female with known diagnosis of hypertension, diabetes, hyperlipidemia, right thalamic stroke with right PCA stenosis versus occlusion in November 2020 when she had left sided symptoms  and left visual field cut, which all resolved with only residual symptom of left leg dragging when fatigued, who comes in with episode of left sided visual symptoms which she saw shape of triangle with waviness associated.  No headache/light sensitivity/nausea.  Symptoms all resolved.  Imaging is negative.      1. Ocular migraine.  Patient current symptomatic presentation is less likely to be vascular, rather than ocular migraine.  Patient's MRI brain showed no acute changes, old right thalamic stroke, no hemorrhage.  Also reviewed her work-up from last admission when she had hyper coag panel which showed ACL IgM 16 (slightly elevated), lupus anticoagulant negative, and other hyper coag panel was also negative.  Her 2D echocardiogram was okay as well.  There was a question whether to get KHURRAM on her, but at this time I do not feel that is warranted.  She is on aspirin 81 mg and Plavix 75 mg daily, which should be continued along with statins.  Recommended her to see neurology outpatient if she has recurrent symptoms.  2. Essential hypertension.  Her blood pressures have been in 120s to 140s.  Can start her home medications, with normal blood pressure goals.  3. Diabetes mellitus type 2 controlled without long-term use of insulin, no complications.  Patient's A1c has improved to 6.6.  Continue her home medications.  4. Acute kidney injury.  Patient's BUN/creatinine is slightly elevated at 27/1.33.  IV fluid support.  Further management as per the hospitalist.  5. PT/OT can work with her.  6. Healthy heart diet.    Case was discussed with patient, nursing, and will call the hospitalist.  All the risk factors were discussed, and appropriate education given.  Okay to discharge her home with outpatient follow-up.  Thank you for the consult.          Joe Perdomo MD  03/12/21  11:57 EST

## 2021-03-12 NOTE — H&P
[facility] Medicine Services  HISTORY AND PHYSICAL    Patient Name: Rosanne Araya  : 1965  MRN: [id number]  Primary Care Physician: Neftali Mccain MD  Date of admission: 3/11/2021    Subjective   Subjective     Chief Complaint:  Vision loss in left eye    HPI:  Rosanne Araya is a 55 y.o. female with a past medical history significant for hypertension, HLD, DM2, recent CVA ( 2020) with residual left sided weakness, and anxiety/depression. She presents today with complaints of acute left sided vision loss at around 1500 with complete resolution of symptoms at 1630. 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 She volunteers intermittent episodes of dizziness and pre-syncope going on for the past 2 days as well. States her dizziness is positional and is worse when she lifts her head or goes from laying to sitting position. States she had initially attributed this to a high dose of terazosin and the recent addition of Jardiance. Terazosin was decreased by half per cardiology follow up 2 weeks ago. Patient also reporting decreased urine output despite staying hydrated. She denies associated headache or focal weakness/parathesias. There are no changes in speech or facial symmetry. No cough, congestion, fever, SOB, or chest pain.  No known COVID exposure.     Records reviewed indicate that patient was admitted to this service in 2020 with right posterior cerebral artery stroke. She was discharged to [facility] with a 30 day cardiac monitor.  Work up, including hypercoagulable panel was inconclusive. Neurology recommended KHURRAM outpatient but this has yet to be scheduled. She returns today with new and unresolved symptoms. Will admit for observation.      COVID Details: [x] No Symptoms  Symptoms: [] Fever []  Cough [] Shortness of breath [] Change in taste or smell  Risks:   [] Direct Exposure [] High risk  facility   Date of Symptoms:   __  Date of first positive COVID test: __      Review of Systems   Constitutional: Negative for chills and fever.   HENT: Negative for congestion and trouble swallowing.    Eyes: Positive for visual disturbance. Negative for photophobia and pain.   Respiratory: Negative for cough and shortness of breath.    Cardiovascular: Negative for chest pain and leg swelling.   Gastrointestinal: Negative for abdominal pain, constipation and nausea.   Endocrine: Negative for cold intolerance and heat intolerance.   Genitourinary: Negative for dysuria and flank pain.   Musculoskeletal: Negative for arthralgias and back pain.   Skin: Negative for pallor and rash.   Neurological: Positive for weakness and headaches.   Hematological: Negative for adenopathy.   Psychiatric/Behavioral: Negative for agitation and confusion.        All other systems reviewed and are negative.     Personal History     Past Medical History:   Diagnosis Date   • Depression    • Diabetes mellitus (CMS/HCC)    • Herpes    • Hypertension    • Stroke (cerebrum) (CMS/HCC)        Past Surgical History:   Procedure Laterality Date   • BACK SURGERY      disc slipped   •  SECTION     • TONSILLECTOMY AND ADENOIDECTOMY     • WISDOM TOOTH EXTRACTION         Family History: family history includes Breast cancer in her maternal aunt; Diabetes in her maternal grandfather, maternal grandmother, and paternal grandmother; Heart attack in her maternal grandmother and paternal grandfather; Heart failure in her paternal grandmother; Hypertension in her maternal grandfather, maternal grandmother, paternal grandfather, and paternal grandmother; Stroke in her maternal grandmother; Thyroid disease in her mother. Otherwise pertinent FHx was reviewed and unremarkable.     Social History:  reports that she quit smoking about 41 years ago. She has never used smokeless tobacco. She reports previous drug use. Drug: Marijuana. She reports that  she does not drink alcohol.  Social History     Social History Narrative    Lives in [place] with  and children    Work as     Caffeine 0       Medications:  Empagliflozin, Lancets, aspirin, atorvastatin, carvedilol, clopidogrel, dilTIAZem CD, glipizide, glucose blood, glucose monitor, losartan, metFORMIN, spironolactone, terazosin, and venlafaxine XR    No Known Allergies    Objective   Objective     Vital Signs:   Temp:  [98.4 °F (36.9 °C)] 98.4 °F (36.9 °C)  Heart Rate:  [68-77] 68  Resp:  [15-16] 16  BP: (139-156)/(84-93) 156/84    Physical Exam   Constitutional: Awake, alert  Eyes: PERRLA, sclerae anicteric, no conjunctival injection  HENT: NCAT, mucous membranes moist  Neck: Supple, no thyromegaly, no lymphadenopathy, trachea midline  Respiratory: Clear to auscultation bilaterally, nonlabored respirations   Cardiovascular: RRR, no murmurs, rubs, or gallops, palpable pedal pulses bilaterally  Gastrointestinal: Positive bowel sounds, soft, nontender, nondistended  Musculoskeletal: No bilateral ankle edema, no clubbing or cyanosis to extremities  Psychiatric: Appropriate affect, cooperative  Neurologic: Oriented x 3, strength symmetric in all extremities, Cranial Nerves grossly intact to confrontation, speech clear  Skin: No rashes      Results Reviewed:  I have personally reviewed most recent indicated data and agree with findings including:  [x]  Laboratory  [x]  Radiology  [x]  EKG/Telemetry  []  Pathology  [x]  Cardiac/Vascular Studies  [x]  Old records  []  Other:  Most pertinent findings include: vitals stable. Sodium 135. Creatinine 1.33. BUN 27. Chemistry and hematology otherwise favorable. EKG without acute changes. CXR notes some mild central congestion.       LAB RESULTS:      Lab 03/11/21  1641 03/11/21  1640 03/11/21  1633   WBC  --  9.34  --    HEMOGLOBIN  --  12.4  --    HEMOGLOBIN, POC  --   --  12.2   HEMATOCRIT  --  37.1  --    HEMATOCRIT POC  --   --  36*   PLATELETS   --  312  --    NEUTROS ABS  --  5.13  --    IMMATURE GRANS (ABS)  --  0.02  --    LYMPHS ABS  --  3.04  --    MONOS ABS  --  0.73  --    EOS ABS  --  0.38  --    MCV  --  91.6  --    APTT 30.3*  --   --          Lab 03/11/21  1706 03/11/21  1632   SODIUM 135*  --    POTASSIUM 4.9  --    CHLORIDE 103  --    CO2 22.0  --    ANION GAP 10.0  --    BUN 27*  --    CREATININE 1.33* 1.40*   GLUCOSE 90  --    CALCIUM 9.3  --          Lab 03/11/21  1706 03/11/21  1641   TOTAL PROTEIN 7.2  --    ALBUMIN 4.30  --    GLOBULIN 2.9  --    ALT (SGPT) 8 8   AST (SGOT) 14 16   BILIRUBIN 0.3  --    ALK PHOS 70  --          Lab 03/11/21  1641   TROPONIN T <0.010                 Lab 03/11/21  1633   PH, ARTERIAL 7.36     Brief Urine Lab Results  (Last result in the past 365 days)      Color   Clarity   Blood   Leuk Est   Nitrite   Protein   CREAT   Urine HCG        03/11/21 1732 Yellow Clear Moderate (2+) Trace Negative Negative             Microbiology Results (last 10 days)     ** No results found for the last 240 hours. **          MRI Brain Without Contrast    Result Date: 3/11/2021  EXAMINATION: MRI BRAIN WO CONTRAST - 03/11/2021  INDICATION: Vision changes.  TECHNIQUE: Multiplanar MRI of the brain without intravenous contrast.  COMPARISON: None.  FINDINGS: No restriction on diffusion-weighted sequences. Midline structures are symmetric without evidence of mass, mass effect or midline shift. Ventricles and sulci within normal limits. Mild to moderate increased signal findings in the periventricular deep white matter suggesting likely small vessel ischemic disease of a somewhat advanced for patient age, however, no susceptibility artifact at these areas or elsewhere apart from mild chronic changes. Globes and orbits retain normal T2 signal characteristics. Paranasal sinuses and mastoid air cells demonstrate mild mucosal thickening of the maxillary sinuses and ethmoid air cells along with a trace right mastoid effusion. No  cerebellopontine angle mass lesion. Normal signal flow voids of the distal internal carotid and vertebrobasilar arteries. Pituitary and sella within normal limits. Cervicomedullary junction widely patent.      Impression: 1. No acute infarction.  2. Mild to moderate chronic small vessel ischemic disease findings somewhat advanced for patient age, however, no focal or generalized atrophy.  DICTATED:   03/11/2021 EDITED/ls :   03/11/2021         XR Chest 1 View    Result Date: 3/11/2021   EXAMINATION: XR CHEST 1 VW - 03/11/2021  INDICATION: Stroke protocol.  COMPARISON: Chest x-ray 11/26/2020  FINDINGS: Cardiac size within normal limits. Increased perihilar lung markings right greater than left consistent with likely central vascular congestion versus minimal peribronchial inflammatory findings. No pneumothorax or pleural effusion. Degenerative changes of the spine.        Impression: Mild prominence of the perihilar lung markings likely central vascular congestion without overt edema or effusion.  DICTATED:   03/11/2021 EDITED/ls :   03/11/2021       CT Head Without Contrast Stroke Protocol    Result Date: 3/11/2021  EXAMINATION: CT HEAD WO CONTRAST STROKE PROTOCOL-  INDICATION: Stroke, follow up  TECHNIQUE: Axial noncontrast CT of the head with multiplanar reconstruction  The radiation dose reduction device was turned on for each scan per the ALARA (As Low as Reasonably Achievable) protocol.  COMPARISON: 11/26/2020  FINDINGS: Gray-white differentiation is maintained, with redemonstrated chronic appearing right frontal centrum semiovale lacunar infarct. There is otherwise no evidence of acute ischemia, hemorrhage, mass or mass effect. The ventricles are normal in size and configuration. The orbits are normal and the paranasal sinuses are grossly clear.      Impression: Stable appearance of the brain with no evidence of acute intracranial abnormality.  Scan performed 4:30 PM 3/11/2021, results discussed with the  stroke team in person by Neftali Mccall at 4:37 PM same day   This report was finalized on 3/11/2021 4:42 PM by Neftali Mccall.        Results for orders placed during the hospital encounter of 11/26/20    Adult Transthoracic Echo Complete W/ Cont if Necessary Per Protocol (With Agitated Saline)    Interpretation Summary  · Left ventricular systolic function is normal. Estimated left ventricular EF = 65%  · Left ventricular wall thickness is consistent with concentric hypertrophy.  · The cardiac valves are anatomically and functionally normal.  · Saline test results are negative.      Assessment/Plan   Assessment & Plan       TIA (transient ischemic attack)    KENYA (acute kidney injury) (CMS/[facility])    Uncontrolled type 2 diabetes mellitus with hyperglycemia (CMS/[facility])    Essential hypertension    Dyslipidemia    Anxiety and depression      1. TIA  - history of recent CVA, currently on DAPT and high intensity statin. Continue  - MRI negative for acute infarct, symptoms resolved  - consult to neurology  - PT/OT/SLP  - obtain echocardiogram  - May need CT angiogram of head and neck  - obtain UDS  - am labs    2. KENYA:  - baseline creatinine 0.06. increased to 1.33  - suspect Jardiance  - saline 75 cc/hr x 12 hours  - hold and avoid nephrotoxins    3. DM2:  - no insulin dependent. Currently controlled. A1c 6.6 in February 20201  - Hold oral hypoglycemics for now  - scheduled accu checks with [facility]     4. Hypertension:  - MRI negative. okay to resume cardizem, coreg, terazosin  - holding losartan and spironolactone per KENYA. Resume as tolerated    5. Anxiety/depression:  - continue venlafaxine    6. Dizziness  - suspect orthostasis vs BPPV  - continue IVF as above, neuro to evaluate  - obtain orthostatic VS in AM    DVT prophylaxis: mechanical      CODE STATUS:  Full code  Code Status and Medical Interventions:   Ordered at: 03/11/21 1917     Level Of Support Discussed With:    Patient     Code Status:    CPR     Medical  Interventions (Level of Support Prior to Arrest):    Full     This note has been completed as part of a split-shared workflow.     Electronically signed by Shane Sethi PA-C, 03/11/21, 7:24 PM EST.    Attending   Admission Attestation         I have seen and examined the patient, performing an independent face-to-face diagnostic evaluation with plan of care reviewed and developed with the advanced practice clinician (APC).      Brief Summary Statement:   Rosanne Araya is a 55 y.o. female prior history of CVA residual left sided weaknes, hypertension, poorly controlled diabetes, anxiety and depression.  Patient presents with acute vision loss that lasted for 2 hours, this is resolved at the time she presented to the ED.  She does endorse ongoing episodes of dizziness, worse when standing and with head movement.CT head was unremarkable.  Neurology was consulted, she is being admitted for TIA work-up.     Remainder of detailed HPI is as noted by APC and has been reviewed and/or edited by me for completeness.     Attending Physical Exam:  Constitutional: Awake, alert  Eyes: PERRLA, sclerae anicteric, no conjunctival injection  HENT: NCAT, mucous membranes moist  Neck: Supple, no thyromegaly, no lymphadenopathy, trachea midline  Respiratory: Clear to auscultation bilaterally, nonlabored respirations   Cardiovascular: RRR, no murmurs, rubs, or gallops, palpable pedal pulses bilaterally  Gastrointestinal: Positive bowel sounds, soft, nontender, nondistended  Musculoskeletal: No bilateral ankle edema, no clubbing or cyanosis to extremities  Psychiatric: Appropriate affect, cooperative  Neurologic: Oriented x 3, strength symmetric in all extremities, Cranial Nerves grossly intact to confrontation, speech clear  Skin: No rashes     Brief Assessment/Plan :  See detailed assessment and plan developed with APC which I have reviewed and/or edited for completeness.     Admission Status: I believe that this patient meets  OBSERVATION status, however if further evaluation or treatment plans warrant, status may change.  Based upon current information, I predict patient's care encounter to be less than or equal to 2 midnights.=samantha Alford MD  03/11/21

## 2021-03-12 NOTE — PLAN OF CARE
VSS. A&Ox4. Pt. Very pleasant and cooperative. NPO initiated @ 0000 for KHURRAM test today. NIH=0.

## 2021-03-12 NOTE — THERAPY DISCHARGE NOTE
Patient Name: Rosanne Araya  : 1965    MRN: [id number]                              Today's Date: 3/12/2021       Admit Date: 3/11/2021    Visit Dx:     ICD-10-CM ICD-9-CM   1. TIA (transient ischemic attack)  G45.9 435.9     Patient Active Problem List   Diagnosis   • Uncontrolled type 2 diabetes mellitus with hyperglycemia (CMS/[facility])   • Essential hypertension   • Herpes labialis   • Family history of thyroid disease in mother   • Acute CVA (cerebrovascular accident) (CMS/[facility])   • Nausea   • Hypokalemia   • Dyslipidemia   • Microalbuminuria due to type 2 diabetes mellitus (CMS/HCC)   • TIA (transient ischemic attack)   • Anxiety and depression   • KENYA (acute kidney injury) (CMS/[facility])     Past Medical History:   Diagnosis Date   • Depression    • Diabetes mellitus (CMS/[facility])    • Herpes    • Hypertension    • Stroke (cerebrum) (CMS/[facility])      Past Surgical History:   Procedure Laterality Date   • BACK SURGERY      disc slipped   •  SECTION     • TONSILLECTOMY AND ADENOIDECTOMY     • WISDOM TOOTH EXTRACTION       General Information     Row Name 21          Physical Therapy Time and Intention    Document Type  discharge evaluation/summary  -CD     Mode of Treatment  physical therapy  -CD     Row Name 21          General Information    Patient Profile Reviewed  yes  -CD     Existing Precautions/Restrictions  no known precautions/restrictions  -CD     Barriers to Rehab  none identified  -CD     Row Name 21          Living Environment    Lives With  child(gena), dependent;spouse  -CD     Row Name 21          Cognition    Orientation Status (Cognition)  oriented x 4  -CD       User Key  (r) = Recorded By, (t) = Taken By, (c) = Cosigned By    Initials Name Provider Type    CD Katherine Friedman PT Physical Therapist        Mobility     Row Name 21          Bed Mobility    Bed Mobility  bed mobility (all) activities  -CD     All Activities,  [place] (Bed Mobility)  independent  -CD     Row Name 03/12/21 0922          Sit-Stand Transfer    Sit-Stand [place] (Transfers)  independent  -CD     Row Name 03/12/21 0922          Gait/Stairs (Locomotion)    [place] Level (Gait)  independent  -CD     Distance in Feet (Gait)  400 feet  -CD     Comment (Gait/Stairs)  NO LOB OR DIFFICULTY WITH BACKWARDS GAIT, TURNING 360 DEGREES OR RETRIEVING ITEM FROM FLOOR.  -CD       User Key  (r) = Recorded By, (t) = Taken By, (c) = Cosigned By    Initials Name Provider Type    Katherine Cooney PT Physical Therapist        Obj/Interventions     Row Name 03/12/21 0923          Range of Motion Comprehensive    General Range of Motion  no range of motion deficits identified  -CD     Comment, General Range of Motion  BLE'S  -CD     [facility] Name 03/12/21 0923          Strength Comprehensive (MMT)    General Manual Muscle Testing (MMT) Assessment  no strength deficits identified  -CD     Comment, General Manual Muscle Testing (MMT) Assessment  BLE'S  -CD     [facility] Name 03/12/21 0923          Motor Skills    Motor Skills  coordination  -CD     Coordination  WNL;bilateral;lower extremity;heel to shin  -CD     [facility] Name 03/12/21 0923          Balance    Balance Assessment  sitting static balance;sitting dynamic balance;standing dynamic balance  -CD     Dynamic Sitting Balance  WNL  -CD     Dynamic Standing Balance  WNL  -CD     Comment, Balance  SEE GAIT NOTE.  -CD       User Key  (r) = Recorded By, (t) = Taken By, (c) = Cosigned By    Initials Name Provider Type    Katherine Cooney PT Physical Therapist        Goals/Plan    No documentation.       Clinical Impression     Row Name 03/12/21 0925          Pain    Additional Documentation  Pain Scale: Numbers Pre/Post-Treatment (Group)  -CD     [facility] Name 03/12/21 0925          Pain Scale: Numbers Pre/Post-Treatment    Pretreatment Pain Rating  0/10 - no pain  -CD     Posttreatment Pain Rating  0/10 - no pain  -CD      Pre/Posttreatment Pain Comment  PT DENIES PAIN AND DIZZINESS WITH MOBILITY.  -CD     Row Name 03/12/21 0925          Plan of Care Review    Plan of Care Reviewed With  patient  -CD     Outcome Summary  GOALS NOT ESTABLISHED AS PT IS AT BASELINE WITH FUNCTIONAL MOBILITY. NO DEFICITS IN STRENGTH, BALANCE, ROM, OR COORDINATION NOTED. PT ABLE TO READ SIGNS IN [place] ACCURATELY ON R/L AND REPORTS ALL SYMPTOMS HAVE RESOLVED. PT SCHEDULED FOR KHURRAM LATER TODAY., WILL D/C INPT P.T. AND RECOMMEND HOME AT D/C FROM HOSPITAL.  -CD     Row Name 03/12/21 0925          Therapy Assessment/Plan (PT)    Patient/Family Therapy Goals Statement (PT)  TO GO HOME  -CD     Criteria for Skilled Interventions Met (PT)  no;no problems identified which require skilled intervention  -CD     Row Name 03/12/21 0925          Vital Signs    Pre Systolic BP Rehab  159  -CD     Pre Treatment Diastolic BP  115  -CD     Intra Systolic BP Rehab  109  -CD     Intra Treatment Diastolic BP  84  -CD     Post Systolic BP Rehab  117  -CD     Post Treatment Diastolic BP  90  -CD     Pre Patient Position  Sitting  -CD     Intra Patient Position  Standing  -CD     Post Patient Position  Sitting  -CD     Row Name 03/12/21 0925          Positioning and Restraints    Pre-Treatment Position  standing in room  -CD     Post Treatment Position  bed  -CD     In Bed  notified nsg;sitting EOB NSG NOTIFIED PT IS SAFE TO BE UP AD JOSEPHINE.  -CD       User Key  (r) = Recorded By, (t) = Taken By, (c) = Cosigned By    Initials Name Provider Type    CD Katherine Friedman, PT Physical Therapist        Outcome Measures     Row Name 03/12/21 0929          How much help from another person do you currently need...    Turning from your back to your side while in flat bed without using bedrails?  4  -CD     Moving from lying on back to sitting on the side of a flat bed without bedrails?  4  -CD     Moving to and from a bed to a chair (including a wheelchair)?  4  -CD     Standing up from a chair  using your arms (e.g., wheelchair, bedside chair)?  4  -CD     Climbing 3-5 steps with a railing?  4  -CD     To walk in hospital room?  4  -CD     AM-PAC 6 Clicks Score (PT)  24  -CD     Row Name 03/12/21 0929          Modified [place] Scale    Modified [place] Scale  0 - No Symptoms at all.  -CD     Row Name 03/12/21 0929          Functional Assessment    Outcome Measure Options  AM-PAC 6 Clicks Basic Mobility (PT);Modified Jennifer  -CD       User Key  (r) = Recorded By, (t) = Taken By, (c) = Cosigned By    Initials Name Provider Type    CD Katherine Friedman PT Physical Therapist        Physical Therapy Education                 Title: PT OT SLP Therapies (In Progress)     Topic: Physical Therapy (Done)     Point: Mobility training (Done)     Learning Progress Summary           Patient Acceptance, E, VU by CD at 3/12/2021 0930    Comment: S&S CVA, F.A.S.T.                   Point: Home exercise program (Done)     Learning Progress Summary           Patient Acceptance, E, VU by CD at 3/12/2021 0930    Comment: S&S CVA, F.A.S.T.                   Point: Body mechanics (Done)     Learning Progress Summary           Patient Acceptance, E, VU by CD at 3/12/2021 0930    Comment: S&S CVA, F.A.S.T.                   Point: Precautions (Done)     Learning Progress Summary           Patient Acceptance, E, VU by CD at 3/12/2021 0930    Comment: S&S CVA, F.A.S.T.                               User Key     Initials Effective Dates Name Provider Type Discipline     06/19/15 -  Katherine Friedman PT Physical Therapist PT              PT Recommendation and Plan     Plan of Care Reviewed With: patient  Outcome Summary: GOALS NOT ESTABLISHED AS PT IS AT BASELINE WITH FUNCTIONAL MOBILITY. NO DEFICITS IN STRENGTH, BALANCE, ROM, OR COORDINATION NOTED. PT ABLE TO READ SIGNS IN [place] ACCURATELY ON R/L AND REPORTS ALL SYMPTOMS HAVE RESOLVED. PT SCHEDULED FOR KHURRAM LATER TODAY., WILL D/C INPT P.T. AND RECOMMEND HOME AT D/C FROM HOSPITAL.     Time  Calculation:   PT Charges     Row Name 03/12/21 0934             Time Calculation    Start Time  0859  -CD      PT Received On  03/12/21  -CD        User Key  (r) = Recorded By, (t) = Taken By, (c) = Cosigned By    Initials Name Provider Type    CD Katherine Friedman, PT Physical Therapist        Therapy Charges for Today     Code Description Service Date Service Provider Modifiers Qty    [id number] HC PT EVAL LOW COMPLEXITY 4 3/12/2021 Katherine Friedman, PT GP 1          PT G-Codes  Outcome Measure Options: AM-PAC 6 Clicks Basic Mobility (PT), Modified [place]  AM-PAC 6 Clicks Score (PT): 24  Modified Jennifer Scale: 0 - No Symptoms at all.    PT Discharge Summary  Anticipated Discharge Disposition (PT): home  Reason for Discharge: At baseline function  Discharge Destination: Home    Katherine Friedman, PT  3/12/2021

## 2021-03-12 NOTE — CONSULTS
After obtaining permission, seen Ms. Araya for diabetes education.  She is known to our service.  She was congratulated on her A1c of 6.8.  It had been 8.4 our last visit.  She states she has worked hard to bring it down.  She has been limiting carbs and drinking more water. She is taking Cardizem, Jardiance, Glipizide, Metformin.  She denies episodes of hypoglycemia.  We reviewed being as active as tolerated. She was able to verbalize information from the stroke class she attended on 12/30/21.  Since this was a new event, we scheduled a repeat class for March 31, 2021.  She has our contact information for questions in the interim. Thank you.

## 2021-03-13 LAB — BACTERIA SPEC AEROBE CULT: NORMAL

## 2021-03-13 NOTE — OUTREACH NOTE
Prep Survey      Responses   [facility] patient discharged from?  [place]   Is LACE score < 7 ?  Yes   Emergency Room discharge w/ pulse ox?  No   Eligibility  [facility]   Date of Admission  03/11/21   Date of Discharge  03/12/21   Discharge Disposition  Home or Self Care   Discharge diagnosis  TIA   Does the patient have one of the following disease processes/diagnoses(primary or secondary)?  Stroke (TIA)   Does the patient have Home health ordered?  No   Is there a DME ordered?  No   Prep survey completed?  Yes          Clarita Cruz RN

## 2021-03-15 ENCOUNTER — TRANSITIONAL CARE MANAGEMENT TELEPHONE ENCOUNTER (OUTPATIENT)
Dept: CALL CENTER | Facility: HOSPITAL | Age: 56
End: 2021-03-15

## 2021-03-15 LAB
QT INTERVAL: 426 MS
QTC INTERVAL: 460 MS

## 2021-03-15 NOTE — OUTREACH NOTE
Call Center TCM Note      Responses   [facility] patient discharged from?  [place]   Does the patient have one of the following disease processes/diagnoses(primary or secondary)?  Stroke (TIA)   TCM attempt successful?  No [Deangelo and Telma-lyndon]   Unsuccessful attempts  Attempt 1   Does the patient have a primary care provider?   Yes   Comments regarding PCP  ([facility] d/c f/u appt is on 3/19/21 at 2:00 pm with Mariama Jack)   Has the patient kept scheduled appointments due by today?  N/A          Angelica Chávez RN    3/15/2021, 09:12 EDT

## 2021-03-15 NOTE — OUTREACH NOTE
Call Center TCM Note      Responses   [facility] patient discharged from?  [place]   Does the patient have one of the following disease processes/diagnoses(primary or secondary)?  Stroke (TIA)   TCM attempt successful?  No   Unsuccessful attempts  Attempt 2          Angelica Chávez RN    3/15/2021, 09:56 EDT

## 2021-03-16 ENCOUNTER — OFFICE VISIT (OUTPATIENT)
Dept: FAMILY MEDICINE CLINIC | Facility: CLINIC | Age: 56
End: 2021-03-16

## 2021-03-16 ENCOUNTER — TRANSITIONAL CARE MANAGEMENT TELEPHONE ENCOUNTER (OUTPATIENT)
Dept: CALL CENTER | Facility: HOSPITAL | Age: 56
End: 2021-03-16

## 2021-03-16 VITALS
HEIGHT: 64 IN | WEIGHT: 178 LBS | SYSTOLIC BLOOD PRESSURE: 122 MMHG | DIASTOLIC BLOOD PRESSURE: 88 MMHG | BODY MASS INDEX: 30.39 KG/M2 | HEART RATE: 74 BPM | OXYGEN SATURATION: 98 %

## 2021-03-16 DIAGNOSIS — E11.65 UNCONTROLLED TYPE 2 DIABETES MELLITUS WITH HYPERGLYCEMIA (HCC): Chronic | ICD-10-CM

## 2021-03-16 DIAGNOSIS — G45.9 TIA (TRANSIENT ISCHEMIC ATTACK): ICD-10-CM

## 2021-03-16 DIAGNOSIS — I63.9 ACUTE CVA (CEREBROVASCULAR ACCIDENT) (HCC): Primary | ICD-10-CM

## 2021-03-16 LAB — RENIN PLAS-CCNC: 0.19 NG/ML/HR (ref 0.17–5.38)

## 2021-03-16 PROCEDURE — 99214 OFFICE O/P EST MOD 30 MIN: CPT | Performed by: INTERNAL MEDICINE

## 2021-03-16 RX ORDER — MAGNESIUM OXIDE 400 MG/1
1 TABLET ORAL EVERY 12 HOURS SCHEDULED
COMMUNITY
End: 2021-07-09 | Stop reason: SDUPTHER

## 2021-03-16 NOTE — PROGRESS NOTES
"Rosanne Araya  1965  [id number]  Patient Care Team:  Neftali Mccain MD as PCP - General (Internal Medicine)  Gio Julian MD as Consulting Physician (Neurology)  Royal Lopez MD as Gynecologist (Gynecology)    Rosanne Araya is a 55 y.o. female here today for follow up.     This patient is accompanied by their self who contributes to the history of their care.    Chief Complaint:    Chief Complaint   Patient presents with   • Cerebrovascular Accident     3/11-3/12 TIA follow up lab told her she needed anticardia antibody GM-level 16      History of Present Illness:  I have reviewed and/or updated the patient's past medical, past surgical, family, social history, problem list and allergies as appropriate.     Patient was admitted duration 3 11-3 12 with transient loss of vision in her left eye.  MRI was unremarkable.  Stroke was ruled out differential diagnosis was ocular migraine versus TIA.  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  Of note she only wore her MCOT for 4 days as it fell off.  She denies any recent palpitations or syncope.  No further visual symptoms.  She is overall feeling well.  The only other study not completed was a transesophageal echocardiogram however her transthoracic echocardiogram showed negative bubble study.  Sugars have been doing better.  She otherwise has no complaint.  She continues to see endocrine for her diabetes.    Review of Systems:    Review of Systems   Constitutional: Negative.    HENT: Negative.    Eyes: Positive for blurred vision.   Respiratory: Negative.    Cardiovascular: Negative.    Gastrointestinal: Negative.    Endocrine: Negative.  Negative for polydipsia, polyphagia and polyuria.   Genitourinary: Negative.    Musculoskeletal: Negative.    Neurological: Negative.        Vitals:    03/16/21 0837   BP: 122/88   Pulse: 74   SpO2: 98%   Weight: 80.7 kg (178 lb)   Height: 162.6 cm (64\")     Body mass index is 30.55 " kg/m².      Current Outpatient Medications:   •  aspirin 81 MG chewable tablet, Chew 1 tablet Daily., Disp:  , Rfl:   •  atorvastatin (LIPITOR) 80 MG tablet, Take 1 tablet by mouth Every Night., Disp: 30 tablet, Rfl: 6  •  carvedilol (COREG) 6.25 MG tablet, Take 2 tablets by mouth 2 (Two) Times a Day With Meals. (Patient taking differently: Take 6.25 mg by mouth 2 (Two) Times a Day With Meals.), Disp: , Rfl:   •  clopidogrel (PLAVIX) 75 MG tablet, Take 1 tablet by mouth Daily., Disp: 30 tablet, Rfl:   •  dilTIAZem CD (CARDIZEM CD) 300 MG 24 hr capsule, Take 1 capsule by mouth Daily., Disp: 90 capsule, Rfl: 1  •  Empagliflozin (Jardiance) 10 MG tablet, Take 10 mg by mouth Daily., Disp: 90 tablet, Rfl: 1  •  glipizide (GLUCOTROL XL) 10 MG 24 hr tablet, Take 10 mg daily in the morning, Disp: 90 tablet, Rfl: 1  •  glucose blood test strip, Check BS BID, Disp: 100 each, Rfl: 11  •  glucose monitor monitoring kit, Use BID to check BS, Disp: 1 each, Rfl: 0  •  Lancets misc, Check BS BID, Disp: 100 each, Rfl: 11  •  losartan (COZAAR) 100 MG tablet, Take 1 tablet by mouth Daily., Disp: 30 tablet, Rfl: 6  •  magnesium oxide (MAG-OX) 400 MG tablet, 1 mg Every 12 (Twelve) Hours., Disp: , Rfl:   •  metFORMIN (GLUCOPHAGE) 1000 MG tablet, Take 1 tablet by mouth 2 (Two) Times a Day With Meals., Disp: 180 tablet, Rfl: 1  •  terazosin (HYTRIN) 2 MG capsule, Take 1 capsule by mouth 2 (two) times a day., Disp: 180 capsule, Rfl: 3  •  venlafaxine XR (EFFEXOR-XR) 75 MG 24 hr capsule, Take 3 capsules by mouth Daily., Disp: 90 capsule, Rfl:     Physical Exam:    Physical Exam  Vitals and nursing note reviewed.   Constitutional:       General: She is not in acute distress.     Appearance: Normal appearance. She is well-developed. She is not diaphoretic.   HENT:      Head: Normocephalic and atraumatic.      Right Ear: External ear normal.      Left Ear: External ear normal.      Mouth/Throat:      Pharynx: No oropharyngeal exudate.   Eyes:       General: No scleral icterus.        Right eye: No discharge.         Left eye: No discharge.      Extraocular Movements: Extraocular movements intact.      Conjunctiva/sclera: Conjunctivae normal.   Neck:      Thyroid: No thyromegaly.      Vascular: No JVD.      Trachea: No tracheal deviation.   Cardiovascular:      Rate and Rhythm: Normal rate and regular rhythm.      Pulses: Normal pulses.      Heart sounds: Normal heart sounds.      Comments: PMI nondisplaced  Pulmonary:      Effort: Pulmonary effort is normal.      Breath sounds: Normal breath sounds. No wheezing or rales.   Musculoskeletal:      Cervical back: Normal range of motion and neck supple.      Comments: Normal gait   Lymphadenopathy:      Cervical: No cervical adenopathy.   Skin:     General: Skin is warm and dry.      Capillary Refill: Capillary refill takes less than 2 seconds.      Coloration: Skin is not pale.      Findings: No rash.   Neurological:      General: No focal deficit present.      Mental Status: She is alert and oriented to person, place, and time.      Motor: No abnormal muscle tone.      Coordination: Coordination normal.   Psychiatric:         Mood and Affect: Mood normal.         Behavior: Behavior normal.         Judgment: Judgment normal.         Procedures    Results Review:    I reviewed the patient's new clinical results.    Assessment/Plan:  Concerned that this was a TIA.  I have repeated anticardiolipin antibody titer.  If this is positive she will need indefinite anticoagulation.  I have also referred her back to heart valve clinic cryptogenic stroke clinic for an event recorder as its very possible she is unaware of atrial fibrillation if indeed she is in paroxysmally.  For the sake of completion KHURRAM may be recommended as well.  Encouraged her to follow-up with her neurologist and I will see her back as scheduled sooner if needed  Problem List Items Addressed This Visit        Endocrine and Metabolic    Uncontrolled  type 2 diabetes mellitus with hyperglycemia (CMS/[facility]) (Chronic)    Relevant Medications    glucose monitor monitoring kit    Lancets misc    glucose blood test strip    metFORMIN (GLUCOPHAGE) 1000 MG tablet    glipizide (GLUCOTROL XL) 10 MG 24 hr tablet    Empagliflozin (Jardiance) 10 MG tablet       Neuro    Acute CVA (cerebrovascular accident) (CMS/[facility]) - Primary    Relevant Medications    atorvastatin (LIPITOR) 80 MG tablet    clopidogrel (PLAVIX) 75 MG tablet    aspirin 81 MG chewable tablet    Other Relevant Orders    Anticardiolipin Antibody, IgG / M, Qn    Ambulatory Referral to [facility]    TIA (transient ischemic attack)    Relevant Orders    Anticardiolipin Antibody, IgG / M, Qn    Ambulatory Referral to [facility] [place] [place]          Plan of care reviewed with patient at the conclusion of today's visit. Education was provided regarding diagnosis and management.  Patient verbalizes understanding of and agreement with management plan.    Return in about 6 months (around 9/16/2021) for Next scheduled follow up.    Neftali Mccain MD    Please note that portions of this note may have been completed with a voice recognition program. Efforts were made to edit the dictations, but occasionally words are mistranscribed.

## 2021-03-16 NOTE — OUTREACH NOTE
Call Center TCM Note      Responses   [facility] patient discharged from?  [place]   Does the patient have one of the following disease processes/diagnoses(primary or secondary)?  Stroke (TIA)   TCM attempt successful?  No   Revoked Reason  Other [has a completed f/u appt with PCP]          Cornelia Blanc RN    3/16/2021, 11:40 EDT

## 2021-03-17 DIAGNOSIS — I63.9 ACUTE CVA (CEREBROVASCULAR ACCIDENT) (HCC): ICD-10-CM

## 2021-03-17 DIAGNOSIS — N91.2 AMENORRHEA: ICD-10-CM

## 2021-03-17 RX ORDER — CARVEDILOL 6.25 MG/1
6.25 TABLET ORAL 2 TIMES DAILY WITH MEALS
Start: 2021-03-17 | End: 2021-03-24 | Stop reason: SDUPTHER

## 2021-03-17 RX ORDER — CLOPIDOGREL BISULFATE 75 MG/1
75 TABLET ORAL DAILY
Qty: 30 TABLET
Start: 2021-03-17 | End: 2021-03-24 | Stop reason: SDUPTHER

## 2021-03-17 RX ORDER — VENLAFAXINE HYDROCHLORIDE 75 MG/1
225 CAPSULE, EXTENDED RELEASE ORAL DAILY
Qty: 90 CAPSULE
Start: 2021-03-17 | End: 2021-03-24 | Stop reason: SDUPTHER

## 2021-03-17 NOTE — TELEPHONE ENCOUNTER
Caller: Marshal Rosanne Bianca    Relationship: Self    Best call back number: [phone]     Medication needed:   Requested Prescriptions     Pending Prescriptions Disp Refills   • carvedilol (COREG) 6.25 MG tablet       Sig: Take 2 tablets by mouth 2 (Two) Times a Day With Meals.   • venlafaxine XR (EFFEXOR-XR) 75 MG 24 hr capsule 90 capsule      Sig: Take 3 capsules by mouth Daily.   • clopidogrel (PLAVIX) 75 MG tablet 30 tablet      Sig: Take 1 tablet by mouth Daily.       When do you need the refill by: 03/17    What additional details did the patient provide when requesting the medication: PATIENT HAS ONE DAY LEFT     Does the patient have less than a 3 day supply:  [x] Yes  [] No    What is the patient's preferred pharmacy: [address] [phone] [place] [phone]

## 2021-03-23 ENCOUNTER — READMISSION MANAGEMENT (OUTPATIENT)
Dept: CALL CENTER | Facility: HOSPITAL | Age: 56
End: 2021-03-23

## 2021-03-23 ENCOUNTER — TELEPHONE (OUTPATIENT)
Dept: CARDIOLOGY | Facility: HOSPITAL | Age: 56
End: 2021-03-23

## 2021-03-23 DIAGNOSIS — I63.9 CEREBROVASCULAR ACCIDENT (CVA), UNSPECIFIED MECHANISM (HCC): Primary | ICD-10-CM

## 2021-03-23 RX ORDER — LIDOCAINE HYDROCHLORIDE 10 MG/ML
0.1 INJECTION, SOLUTION EPIDURAL; INFILTRATION; INTRACAUDAL; PERINEURAL ONCE AS NEEDED
Status: CANCELLED | OUTPATIENT
Start: 2021-03-23

## 2021-03-23 RX ORDER — ONDANSETRON 2 MG/ML
4 INJECTION INTRAMUSCULAR; INTRAVENOUS EVERY 6 HOURS PRN
Status: CANCELLED | OUTPATIENT
Start: 2021-03-23

## 2021-03-23 NOTE — TELEPHONE ENCOUNTER
----- Message from Lauri Ventura MD sent at 3/23/2021 11:45 AM EDT -----  Best option would be for her to undergo implantable loop recorder and same-day KHURRAM. Thanks.  ----- Message -----  From: Toña Roberto APRN  Sent: 3/23/2021  11:28 AM EDT  To: Lauri Ventura MD    Hi Dr. Ventura,    Patient's PCP is sending her back to us for another MCOT, appt with me is tomorrow.  I am not sure if you noticed but patient only wore it for 4 days (for hx of CVA to rule out AF) because she said it fell off. He also wanted us to consider KHURRAM. Would you rather set her up for loop recorder or would you like me to place another MCOT? Also, do you want her to have a KHURRAM?    She saw you in February, was supposed to have a follow up in March, but appt has not been made yet. Would you rather me try and get her in sooner with you to discuss?    Thank you!  Toña

## 2021-03-23 NOTE — TELEPHONE ENCOUNTER
Spoke with patient and recommend that she proceed with loop recorder. She had a lot of skin irritation with monitor and says she wore it but for some reason we only got 4 days of data. Dr. Ventura would like her to have KHURRAM the same day. She verbalized understanding with no further questions or concerns. Will cancel her appt with [facility] tomorrow.

## 2021-03-23 NOTE — OUTREACH NOTE
Stroke Week 2 Survey      Responses   [facility] patient discharged from?  [place]   Does the patient have one of the following disease processes/diagnoses(primary or secondary)?  Stroke (TIA)   Week 2 attempt successful?  No   Unsuccessful attempts  Attempt 1          Pepe Mccauley RN

## 2021-03-24 DIAGNOSIS — N91.2 AMENORRHEA: ICD-10-CM

## 2021-03-24 DIAGNOSIS — I63.9 ACUTE CVA (CEREBROVASCULAR ACCIDENT) (HCC): ICD-10-CM

## 2021-03-24 DIAGNOSIS — I63.9 ACUTE CVA (CEREBROVASCULAR ACCIDENT) (HCC): Primary | ICD-10-CM

## 2021-03-24 RX ORDER — VENLAFAXINE HYDROCHLORIDE 75 MG/1
225 CAPSULE, EXTENDED RELEASE ORAL DAILY
Qty: 90 CAPSULE
Start: 2021-03-24 | End: 2021-07-09 | Stop reason: SDUPTHER

## 2021-03-24 RX ORDER — CLOPIDOGREL BISULFATE 75 MG/1
75 TABLET ORAL DAILY
Qty: 30 TABLET
Start: 2021-03-24 | End: 2021-07-09 | Stop reason: SDUPTHER

## 2021-03-24 RX ORDER — CARVEDILOL 6.25 MG/1
6.25 TABLET ORAL 2 TIMES DAILY WITH MEALS
Start: 2021-03-24 | End: 2021-07-09 | Stop reason: SDUPTHER

## 2021-03-24 NOTE — TELEPHONE ENCOUNTER
Caller:  JOANNA Romeo call back number: [phone]     Medication needed:   Requested Prescriptions     Pending Prescriptions Disp Refills   • carvedilol (COREG) 6.25 MG tablet       Sig: Take 1 tablet by mouth 2 (Two) Times a Day With Meals.   • venlafaxine XR (EFFEXOR-XR) 75 MG 24 hr capsule 90 capsule      Sig: Take 3 capsules by mouth Daily.   • clopidogrel (PLAVIX) 75 MG tablet 30 tablet      Sig: Take 1 tablet by mouth Daily.   ALSO NEEDS SPIRONOLACTONE     When do you need the refill by: TODAY     What additional details did the patient provide when requesting the medication: PATIENT TALKED TO THE PHARMACY ON Monday AND THEY HAVE NOT RECEIVED THE PRESCRIPTIONS   PHARMACY GAVE THE PATIENT A FEW DAYS FOR  EMERGENCY     Does the patient have less than a 3 day supply:  [x] Yes  [] No    What is the patient's preferred pharmacy: [facility] [address] [place] [address] [phone] [facility] [phone]

## 2021-03-26 DIAGNOSIS — I63.9 ACUTE CVA (CEREBROVASCULAR ACCIDENT) (HCC): ICD-10-CM

## 2021-03-26 DIAGNOSIS — N91.2 AMENORRHEA: ICD-10-CM

## 2021-03-26 RX ORDER — CARVEDILOL 6.25 MG/1
6.25 TABLET ORAL 2 TIMES DAILY WITH MEALS
Start: 2021-03-26

## 2021-03-26 RX ORDER — CLOPIDOGREL BISULFATE 75 MG/1
75 TABLET ORAL DAILY
Qty: 30 TABLET
Start: 2021-03-26

## 2021-03-26 RX ORDER — TERAZOSIN 2 MG/1
2 CAPSULE ORAL 2 TIMES DAILY
Qty: 180 CAPSULE | Refills: 3 | OUTPATIENT
Start: 2021-03-26

## 2021-03-26 RX ORDER — VENLAFAXINE HYDROCHLORIDE 75 MG/1
225 CAPSULE, EXTENDED RELEASE ORAL DAILY
Qty: 90 CAPSULE
Start: 2021-03-26

## 2021-03-26 NOTE — TELEPHONE ENCOUNTER
Caller: Rosanne Araya    Relationship: Self    Best call back number:     [phone]     Medication needed:   Requested Prescriptions     Pending Prescriptions Disp Refills   • clopidogrel (PLAVIX) 75 MG tablet 30 tablet      Sig: Take 1 tablet by mouth Daily.   • terazosin (HYTRIN) 2 MG capsule 180 capsule 3     Sig: Take 1 capsule by mouth 2 (two) times a day.   • carvedilol (COREG) 6.25 MG tablet       Sig: Take 1 tablet by mouth 2 (Two) Times a Day With Meals.   • venlafaxine XR (EFFEXOR-XR) 75 MG 24 hr capsule 90 capsule      Sig: Take 3 capsules by mouth Daily.     SPIRONOLACTONE - (PATIENT WASN'T SURE OF THE MG FOR THIS MEDICATION)    When do you need the refill by:     ASAP    What additional details did the patient provide when requesting the medication:    PATIENT IS COMPLETELY OUT OF THESE MEDICATIONS    Does the patient have less than a 3 day supply:  [x] Yes  [] No    What is the patient's preferred pharmacy:      [facility] [place] [place]    TELEPHONE CONTACT:    [phone]    FAX:    [phone]    DR HAO MD

## 2021-03-26 NOTE — TELEPHONE ENCOUNTER
Attempted to contact patient, no answer. Unable to leave voicemail,box full.       Prescriptions were sent to the pharmacy on 3/24. Pt needs to contact pharmacy about refills.    Hub may relay message and document.

## 2021-03-30 ENCOUNTER — TELEPHONE (OUTPATIENT)
Dept: CARDIOLOGY | Facility: HOSPITAL | Age: 56
End: 2021-03-30

## 2021-03-30 NOTE — TELEPHONE ENCOUNTER
----- Message from Argelia Luna sent at 3/30/2021 10:33 AM EDT -----  We have tried to call her three times and her voicemail is full so we will be mailing a letter to her to have her call in to sh[facility]ad  ----- Message -----  From: Toña Roberto APRN  Sent: 3/30/2021  10:14 AM EDT  To: Stephania Pan, #    Hello,    I just was checking in to make sure patient has been scheduled for KHURRAM/loop. I saw the orders but didn't see if it was scheduled.    Thank you,  Toña

## 2021-04-07 ENCOUNTER — PREP FOR SURGERY (OUTPATIENT)
Dept: OTHER | Facility: HOSPITAL | Age: 56
End: 2021-04-07

## 2021-04-07 DIAGNOSIS — I63.9 CVA (CEREBRAL VASCULAR ACCIDENT) (HCC): Primary | ICD-10-CM

## 2021-04-07 RX ORDER — SODIUM CHLORIDE 0.9 % (FLUSH) 0.9 %
3 SYRINGE (ML) INJECTION EVERY 12 HOURS SCHEDULED
Status: CANCELLED | OUTPATIENT
Start: 2021-04-07

## 2021-04-07 RX ORDER — SODIUM CHLORIDE 0.9 % (FLUSH) 0.9 %
10 SYRINGE (ML) INJECTION AS NEEDED
Status: CANCELLED | OUTPATIENT
Start: 2021-04-07

## 2021-04-18 PROBLEM — N17.9 AKI (ACUTE KIDNEY INJURY): Status: RESOLVED | Noted: 2021-03-11 | Resolved: 2021-04-18

## 2021-04-18 PROBLEM — Z83.49 FAMILY HISTORY OF THYROID DISEASE IN MOTHER: Status: RESOLVED | Noted: 2020-08-05 | Resolved: 2021-04-18

## 2021-04-18 PROBLEM — E87.6 HYPOKALEMIA: Status: RESOLVED | Noted: 2020-11-26 | Resolved: 2021-04-18

## 2021-04-19 ENCOUNTER — HOSPITAL ENCOUNTER (OUTPATIENT)
Dept: CARDIOLOGY | Facility: HOSPITAL | Age: 56
Discharge: HOME | End: 2021-04-19

## 2021-04-19 ENCOUNTER — HOSPITAL ENCOUNTER (OUTPATIENT)
Facility: HOSPITAL | Age: 56
Discharge: HOME | End: 2021-04-19
Attending: INTERNAL MEDICINE | Admitting: INTERNAL MEDICINE

## 2021-04-19 VITALS
RESPIRATION RATE: 15 BRPM | WEIGHT: 179 LBS | DIASTOLIC BLOOD PRESSURE: 84 MMHG | HEART RATE: 71 BPM | OXYGEN SATURATION: 95 % | SYSTOLIC BLOOD PRESSURE: 131 MMHG | BODY MASS INDEX: 30.56 KG/M2 | TEMPERATURE: 97.4 F | HEIGHT: 64 IN

## 2021-04-19 DIAGNOSIS — I63.9 ACUTE CVA (CEREBROVASCULAR ACCIDENT) (HCC): ICD-10-CM

## 2021-04-19 DIAGNOSIS — I63.9 CEREBROVASCULAR ACCIDENT (CVA), UNSPECIFIED MECHANISM (HCC): ICD-10-CM

## 2021-04-19 DIAGNOSIS — I63.9 CVA (CEREBRAL VASCULAR ACCIDENT) (HCC): ICD-10-CM

## 2021-04-19 LAB
ALBUMIN SERPL-MCNC: 4.3 G/DL (ref 3.5–5.2)
ALBUMIN/GLOB SERPL: 1.7 G/DL
ALP SERPL-CCNC: 84 U/L (ref 39–117)
ALT SERPL W P-5'-P-CCNC: 9 U/L (ref 1–33)
ANION GAP SERPL CALCULATED.3IONS-SCNC: 7 MMOL/L (ref 5–15)
AST SERPL-CCNC: 18 U/L (ref 1–32)
B-HCG UR QL: NEGATIVE
BASOPHILS # BLD AUTO: 0.07 10*3/MM3 (ref 0–0.2)
BASOPHILS NFR BLD AUTO: 0.7 % (ref 0–1.5)
BH CV ECHO MEAS - BSA(HAYCOCK): 1.9 M^2
BH CV ECHO MEAS - BSA: 1.9 M^2
BH CV ECHO MEAS - BZI_BMI: 30.7 KILOGRAMS/M^2
BH CV ECHO MEAS - BZI_METRIC_HEIGHT: 162.6 CM
BH CV ECHO MEAS - BZI_METRIC_WEIGHT: 81.2 KG
BILIRUB SERPL-MCNC: 0.3 MG/DL (ref 0–1.2)
BUN SERPL-MCNC: 26 MG/DL (ref 6–20)
BUN/CREAT SERPL: 25.2 (ref 7–25)
CALCIUM SPEC-SCNC: 9 MG/DL (ref 8.6–10.5)
CHLORIDE SERPL-SCNC: 104 MMOL/L (ref 98–107)
CHOLEST SERPL-MCNC: 109 MG/DL (ref 0–200)
CO2 SERPL-SCNC: 26 MMOL/L (ref 22–29)
CREAT BLDA-MCNC: 1 MG/DL (ref 0.6–1.3)
CREAT SERPL-MCNC: 1.03 MG/DL (ref 0.57–1)
DEPRECATED RDW RBC AUTO: 39.9 FL (ref 37–54)
EOSINOPHIL # BLD AUTO: 0.73 10*3/MM3 (ref 0–0.4)
EOSINOPHIL NFR BLD AUTO: 6.9 % (ref 0.3–6.2)
ERYTHROCYTE [DISTWIDTH] IN BLOOD BY AUTOMATED COUNT: 11.7 % (ref 12.3–15.4)
GFR SERPL CREATININE-BSD FRML MDRD: 56 ML/MIN/1.73
GLOBULIN UR ELPH-MCNC: 2.6 GM/DL
GLUCOSE SERPL-MCNC: 165 MG/DL (ref 65–99)
HBA1C MFR BLD: 6.9 % (ref 4.8–5.6)
HCT VFR BLD AUTO: 37.3 % (ref 34–46.6)
HDLC SERPL-MCNC: 43 MG/DL (ref 40–60)
HGB BLD-MCNC: 12.3 G/DL (ref 12–15.9)
IMM GRANULOCYTES # BLD AUTO: 0.02 10*3/MM3 (ref 0–0.05)
IMM GRANULOCYTES NFR BLD AUTO: 0.2 % (ref 0–0.5)
LDLC SERPL CALC-MCNC: 51 MG/DL (ref 0–100)
LDLC/HDLC SERPL: 1.19 {RATIO}
LYMPHOCYTES # BLD AUTO: 2.53 10*3/MM3 (ref 0.7–3.1)
LYMPHOCYTES NFR BLD AUTO: 23.9 % (ref 19.6–45.3)
MCH RBC QN AUTO: 30.8 PG (ref 26.6–33)
MCHC RBC AUTO-ENTMCNC: 33 G/DL (ref 31.5–35.7)
MCV RBC AUTO: 93.5 FL (ref 79–97)
MONOCYTES # BLD AUTO: 0.71 10*3/MM3 (ref 0.1–0.9)
MONOCYTES NFR BLD AUTO: 6.7 % (ref 5–12)
NEUTROPHILS NFR BLD AUTO: 6.54 10*3/MM3 (ref 1.7–7)
NEUTROPHILS NFR BLD AUTO: 61.6 % (ref 42.7–76)
NRBC BLD AUTO-RTO: 0 /100 WBC (ref 0–0.2)
PLATELET # BLD AUTO: 291 10*3/MM3 (ref 140–450)
PMV BLD AUTO: 10.7 FL (ref 6–12)
POTASSIUM SERPL-SCNC: 4.6 MMOL/L (ref 3.5–5.2)
PROT SERPL-MCNC: 6.9 G/DL (ref 6–8.5)
RBC # BLD AUTO: 3.99 10*6/MM3 (ref 3.77–5.28)
SARS-COV-2 RDRP RESP QL NAA+PROBE: NORMAL
SODIUM SERPL-SCNC: 137 MMOL/L (ref 136–145)
TRIGL SERPL-MCNC: 75 MG/DL (ref 0–150)
VLDLC SERPL-MCNC: 15 MG/DL (ref 5–40)
WBC # BLD AUTO: 10.6 10*3/MM3 (ref 3.4–10.8)

## 2021-04-19 PROCEDURE — 81025 URINE PREGNANCY TEST: CPT | Performed by: INTERNAL MEDICINE

## 2021-04-19 PROCEDURE — 93312 ECHO TRANSESOPHAGEAL: CPT

## 2021-04-19 PROCEDURE — 93325 DOPPLER ECHO COLOR FLOW MAPG: CPT | Performed by: INTERNAL MEDICINE

## 2021-04-19 PROCEDURE — 93325 DOPPLER ECHO COLOR FLOW MAPG: CPT

## 2021-04-19 PROCEDURE — 85025 COMPLETE CBC W/AUTO DIFF WBC: CPT | Performed by: NURSE PRACTITIONER

## 2021-04-19 PROCEDURE — 33285 INSJ SUBQ CAR RHYTHM MNTR: CPT | Performed by: INTERNAL MEDICINE

## 2021-04-19 PROCEDURE — 93321 DOPPLER ECHO F-UP/LMTD STD: CPT | Performed by: INTERNAL MEDICINE

## 2021-04-19 PROCEDURE — 82565 ASSAY OF CREATININE: CPT

## 2021-04-19 PROCEDURE — 80053 COMPREHEN METABOLIC PANEL: CPT | Performed by: NURSE PRACTITIONER

## 2021-04-19 PROCEDURE — C9803 HOPD COVID-19 SPEC COLLECT: HCPCS

## 2021-04-19 PROCEDURE — 25010000003 CEFAZOLIN IN DEXTROSE 2-4 GM/100ML-% SOLUTION: Performed by: NURSE PRACTITIONER

## 2021-04-19 PROCEDURE — 93321 DOPPLER ECHO F-UP/LMTD STD: CPT

## 2021-04-19 PROCEDURE — 83036 HEMOGLOBIN GLYCOSYLATED A1C: CPT | Performed by: NURSE PRACTITIONER

## 2021-04-19 PROCEDURE — 25010000002 MIDAZOLAM PER 1 MG: Performed by: INTERNAL MEDICINE

## 2021-04-19 PROCEDURE — 87635 SARS-COV-2 COVID-19 AMP PRB: CPT | Performed by: INTERNAL MEDICINE

## 2021-04-19 PROCEDURE — 80061 LIPID PANEL: CPT | Performed by: NURSE PRACTITIONER

## 2021-04-19 PROCEDURE — S0260 H&P FOR SURGERY: HCPCS | Performed by: INTERNAL MEDICINE

## 2021-04-19 PROCEDURE — 93312 ECHO TRANSESOPHAGEAL: CPT | Performed by: INTERNAL MEDICINE

## 2021-04-19 PROCEDURE — C1764 EVENT RECORDER, CARDIAC: HCPCS | Performed by: INTERNAL MEDICINE

## 2021-04-19 DEVICE — LUX-DX™ INSERTABLE CARDIAC MONITOR
Type: IMPLANTABLE DEVICE | Site: CHEST | Status: FUNCTIONAL
Brand: LUX-DX™ INSERTABLE CARDIAC MONITOR

## 2021-04-19 RX ORDER — FENTANYL CITRATE 50 UG/ML
INJECTION, SOLUTION INTRAMUSCULAR; INTRAVENOUS
Status: DISCONTINUED
Start: 2021-04-19 | End: 2021-04-19 | Stop reason: WASHOUT

## 2021-04-19 RX ORDER — SODIUM CHLORIDE 0.9 % (FLUSH) 0.9 %
10 SYRINGE (ML) INJECTION AS NEEDED
Status: DISCONTINUED | OUTPATIENT
Start: 2021-04-19 | End: 2021-04-19 | Stop reason: HOSPADM

## 2021-04-19 RX ORDER — NALOXONE HYDROCHLORIDE 0.4 MG/ML
INJECTION, SOLUTION INTRAMUSCULAR; INTRAVENOUS; SUBCUTANEOUS
Status: DISCONTINUED
Start: 2021-04-19 | End: 2021-04-19 | Stop reason: WASHOUT

## 2021-04-19 RX ORDER — LIDOCAINE HYDROCHLORIDE 10 MG/ML
0.1 INJECTION, SOLUTION EPIDURAL; INFILTRATION; INTRACAUDAL; PERINEURAL ONCE AS NEEDED
Status: DISCONTINUED | OUTPATIENT
Start: 2021-04-19 | End: 2021-04-19 | Stop reason: HOSPADM

## 2021-04-19 RX ORDER — MIDAZOLAM HYDROCHLORIDE 1 MG/ML
INJECTION INTRAMUSCULAR; INTRAVENOUS
Status: COMPLETED | OUTPATIENT
Start: 2021-04-19 | End: 2021-04-19

## 2021-04-19 RX ORDER — MIDAZOLAM HYDROCHLORIDE 1 MG/ML
INJECTION INTRAMUSCULAR; INTRAVENOUS
Status: DISCONTINUED
Start: 2021-04-19 | End: 2021-04-19 | Stop reason: HOSPADM

## 2021-04-19 RX ORDER — ONDANSETRON 2 MG/ML
4 INJECTION INTRAMUSCULAR; INTRAVENOUS EVERY 6 HOURS PRN
Status: DISCONTINUED | OUTPATIENT
Start: 2021-04-19 | End: 2021-04-19 | Stop reason: HOSPADM

## 2021-04-19 RX ORDER — LIDOCAINE HYDROCHLORIDE 10 MG/ML
INJECTION, SOLUTION EPIDURAL; INFILTRATION; INTRACAUDAL; PERINEURAL AS NEEDED
Status: DISCONTINUED | OUTPATIENT
Start: 2021-04-19 | End: 2021-04-19 | Stop reason: HOSPADM

## 2021-04-19 RX ORDER — SODIUM CHLORIDE 0.9 % (FLUSH) 0.9 %
3 SYRINGE (ML) INJECTION EVERY 12 HOURS SCHEDULED
Status: DISCONTINUED | OUTPATIENT
Start: 2021-04-19 | End: 2021-04-19 | Stop reason: HOSPADM

## 2021-04-19 RX ORDER — FLUMAZENIL 0.1 MG/ML
INJECTION INTRAVENOUS
Status: DISCONTINUED
Start: 2021-04-19 | End: 2021-04-19 | Stop reason: WASHOUT

## 2021-04-19 RX ORDER — CEFAZOLIN SODIUM 2 G/100ML
2 INJECTION, SOLUTION INTRAVENOUS ONCE
Status: COMPLETED | OUTPATIENT
Start: 2021-04-19 | End: 2021-04-19

## 2021-04-19 RX ADMIN — MIDAZOLAM 2 MG: 1 INJECTION INTRAMUSCULAR; INTRAVENOUS at 10:36

## 2021-04-19 RX ADMIN — METHOHEXITAL SODIUM 20 MG: 500 INJECTION, POWDER, LYOPHILIZED, FOR SOLUTION INTRAMUSCULAR; INTRAVENOUS; RECTAL at 10:38

## 2021-04-19 RX ADMIN — METHOHEXITAL SODIUM 30 MG: 500 INJECTION, POWDER, LYOPHILIZED, FOR SOLUTION INTRAMUSCULAR; INTRAVENOUS; RECTAL at 10:36

## 2021-04-19 RX ADMIN — CEFAZOLIN SODIUM 2 G: 2 INJECTION, SOLUTION INTRAVENOUS at 09:49

## 2021-04-19 NOTE — H&P
Rosanne Araya  [id number]  1965   LOS: 0 days   Patient Care Team:  REFERRING PROVIDER: CHANDNI Monsivais  PHYSICIAN: Neftali Mccain MD  NEUROLOGIST: Jesús Costello MD  ENDOCRINOLOGY PROVIDER: Addie Mckeon PA-C    Rosanne Araya is a 55 y.o.  white female from [place], [place], works as a  at [employer]     Chief Complaint: CVA    Problem List:  1. Right posterior cerebral artery CVA probable combined severe hypertension and uncontrolled diabetes with dyslipidemia as etiology.  a. CT head 11/26/2020: No acute intracranial abnormality, age-related changes of the brain including likely chronic right frontal lobe lacunar infarct   b. CT cerebral perfusion 11/26/2020: Findings consistent with focal right PCA territory ischemia and larger area of slow flow.  No apparent core infarct.  c. CTA head/neck 11/26/2020: Truncated appearance of the proximal right posterior cerebral artery consistent with patient's perfusion scan abnormality, potentially 50% or greater distal left cavernous carotid artery stenosis  d. Echocardiogram 11/27/2020: LVEF 65%, LV wall thickness consistent with concentric hypertrophy, the cardiac valves are anatomically and functionally normal, saline test results negative  e. Transcranial Doppler 11/27/2020: Normal mean flow velocities of bilateral MCA, terminal ICA.  No Doppler evidence suggestive of PFO  f. Mobile cardiac outpatient telemetry monitor 12/14/2020: Normal monitor study  g. Negative hypercoagulable work-up  h. MRI brain 11/26/2020: Scattered small old vessel ischemic changes, no evidence of recent ischemia  i. Mild residual left-sided weakness  j. [facility] 3/11/2021 overnight admission for TIA in the setting of KENYA, dehydration  k. CT head 3/11/2021:Stable appearance of the brain with no evidence of acute intracranial abnormality  l. Transcranial Doppler 3/11/2021: Normal mean flow velocities of bilateral MCA, terminal ICA.  No  Doppler evidence suggestive of PFO  m. MRI brain 3/11/2021: No acute infarction, mild to moderate chronic small vessel ischemic disease findings somewhat advanced for patient age, however, no focal or generalized atrophy  n. KHURRAM 2021  o. Implantable loop recorder 2021  2. Uncontrolled hypertension with negative renal artery duplex 2020 for renal artery stenosis  3. Hyperlipidemia; on statin therapy  4. Type 2 diabetes mellitus; hemoglobin A1c 8.4% 2020  5. Depression  6. Mild obesity: BMI 30.38  7. Herpes labialis  8. Remote weekly marijuana use  9. Surgical history:  a. Tonsils and adenoids  b.    c. Laminectomy x2    No Known Allergies  Medications Prior to Admission   Medication Sig Dispense Refill Last Dose   • aspirin 81 MG chewable tablet Chew 1 tablet Daily.      • atorvastatin (LIPITOR) 80 MG tablet Take 1 tablet by mouth Every Night. 30 tablet 6    • carvedilol (COREG) 6.25 MG tablet Take 1 tablet by mouth 2 (Two) Times a Day With Meals.      • clopidogrel (PLAVIX) 75 MG tablet Take 1 tablet by mouth Daily. 30 tablet     • dilTIAZem CD (CARDIZEM CD) 300 MG 24 hr capsule Take 1 capsule by mouth Daily. 90 capsule 1    • Empagliflozin (Jardiance) 10 MG tablet Take 10 mg by mouth Daily. 90 tablet 1    • glipizide (GLUCOTROL XL) 10 MG 24 hr tablet Take 10 mg daily in the morning 90 tablet 1    • glucose blood test strip Check BS  each 11    • glucose monitor monitoring kit Use BID to check BS 1 each 0    • Lancets misc Check BS  each 11    • losartan (COZAAR) 100 MG tablet Take 1 tablet by mouth Daily. 30 tablet 6    • magnesium oxide (MAG-OX) 400 MG tablet 1 mg Every 12 (Twelve) Hours.      • metFORMIN (GLUCOPHAGE) 1000 MG tablet Take 1 tablet by mouth 2 (Two) Times a Day With Meals. 180 tablet 1    • terazosin (HYTRIN) 2 MG capsule Take 1 capsule by mouth 2 (two) times a day. 180 capsule 3    • venlafaxine XR (EFFEXOR-XR) 75 MG 24 hr capsule Take 3  capsules by mouth Daily. 90 capsule       Scheduled Meds:  Continuous Infusions:No current facility-administered medications for this encounter.    PRN Meds:.       History of Present Illness:   This is a 55-year-old white female who had a CVA November 2020 with mild residual left-sided weakness.  The patient wore a mobile cardiac outpatient telemetry monitor which was a negative study.  Her echocardiogram was acceptable and negative for PFO.  At her last office appointment in February 2021 Dr. Ventura recommended an outpatient sleep oximetry study at some point and also to follow-up with her endocrinologist regarding her uncontrolled type 2 diabetes mellitus.  Since that time, the patient had a 20-hour admission March 2021 for a TIA in the setting of KENYA/dehydration.  The patient states that she was having visual disturbances and felt like she lost vision in a pie shape in her left eye.  She denied any headaches, weakness, speech disturbances, or facial drooping during this episode.  The visual disturbances were described as fuzziness and was not black.  This occurred with sudden onset while she was at work.  She was told that it could have been an ocular migraine.  She is not sure when her next neurology follow-up appointment is.  CT head and MRI of the brain were negative for acute infarct.  Transcranial Doppler negative for PFO.  She presents to [facility] 04/19/2021 to have a KHURRAM and a loop recorder implant.  She has had both of her Covid vaccinations.  She believes that she had her Covid nasal swab pre-procedure at [facility], but the results are not available in epic.    Cardiac risk factors: diabetes mellitus, dyslipidemia, hypertension, obesity (BMI >= 30 kg/m2) and sedentary lifestyle.    Social History     Socioeconomic History   • Marital status:      Spouse name: Not on file   • Number of children: Not on file   • Years of education: Not on file   • Highest education level: Not on file   Tobacco  Use   • Smoking status: Former Smoker     Quit date:      Years since quittin.3   • Smokeless tobacco: Never Used   Substance and Sexual Activity   • Alcohol use: Never   • Drug use: Not Currently     Types: Marijuana     Comment: once weekly, last use 20   • Sexual activity: Defer     Family History   Problem Relation Age of Onset   • Thyroid disease Mother    • Breast cancer Maternal Aunt    • Hypertension Maternal Grandmother    • Diabetes Maternal Grandmother    • Stroke Maternal Grandmother    • Heart attack Maternal Grandmother    • Hypertension Maternal Grandfather    • Diabetes Maternal Grandfather    • Hypertension Paternal Grandmother    • Diabetes Paternal Grandmother    • Heart failure Paternal Grandmother    • Hypertension Paternal Grandfather    • Heart attack Paternal Grandfather        Review of Systems  10 point review of systems was completed, positives outlined in the HPI, and otherwise all other systems are negative.      Objective:       Physical Exam  There were no vitals taken for this visit.  There were no vitals filed for this visit.  There is no height or weight on file to calculate BMI.  No intake or output data in the 24 hours ending 21 0844  bp 118/72, HR 70bpm, RR16, O2 sat 94% on RA  General Appearance:  Alert, cooperative, no distress, appears stated age   Head:  Normocephalic, without obvious abnormality, atraumatic   Neck: Supple, symmetrical, trachea midline, no adenopathy, thyroid: not enlarged, symmetric, no tenderness/mass/nodules, no carotid bruit or JVD   Lungs:   Clear to auscultation bilaterally, respirations unlabored   Heart:  Regular rate and rhythm, S1, S2 normal, grade 2/6 murmur, rub or gallop   Abdomen:   Soft, non-tender, no masses, no organomegaly, bowel sounds audible x4   Extremities: No edema, normal range of motion   Pulses: 2+ and symmetric   Skin: Skin color, texture, turgor normal, no rashes or lesions   Neurologic: Normal        Cardiographics  EKG 03/11/2021:  Normal sinus rhythm  Normal ECG  When compared with ECG of 14-DEC-2020 12:47,  Nonspecific T wave abnormality, improved in Inferior leads  Nonspecific T wave abnormality no longer evident in Anterolateral leads  Confirmed by JOSE ALEJANDRO DAWN MD ([id number]) on 3/15/2021 10:06:49 AM    Imaging  Chest x-ray 03/11/2021:  Mild prominence of the perihilar lung markings likely  central vascular congestion without overt edema or effusion.  MRI brain 3/11/2021:  1. No acute infarction.  2. Mild to moderate chronic small vessel ischemic disease findings somewhat advanced for patient age, however, no focal or generalized atrophy  CT head 3/11/2021:  Stable appearance of the brain with no evidence of acute intracranial abnormality  Transcranial Doppler 3/11/2021: Normal mean flow velocities of bilateral MCA, terminal ICA.  No Doppler evidence suggestive of PFO    Lab Review                           Assessment:   Patient with CVA November 2020 with mild residual left-sided weakness.  The patient wore a mobile cardiac outpatient telemetry monitor which was a negative study.  Her echocardiogram was acceptable and negative for PFO.  She had a recurrent TIA March 2021 with visual disturbances in the setting of KENYA/dehydration with negative CT head and MRI of the brain for acute infarct.Transcranial Doppler negative for PFO.  The patient will have a KHURRAM and implantable loop recorder 04/19/2021; procedures, risks, and complications discussed with the patient and she is agreeable to proceed.  Covid testing pending.  No new labs to review.          Plan:      1.  KHURRAM  2.  Implantable loop recorder  3.  Follow-up with Dr. Ventura in 6 weeks    Electronically signed by CHANDNI Beard, 04/19/21, 8:40 AM EDT.

## 2021-07-06 PROCEDURE — G2066 INTER DEVC REMOTE 30D: HCPCS | Performed by: INTERNAL MEDICINE

## 2021-07-06 PROCEDURE — 93298 REM INTERROG DEV EVAL SCRMS: CPT | Performed by: INTERNAL MEDICINE

## 2021-07-09 ENCOUNTER — OFFICE VISIT (OUTPATIENT)
Dept: FAMILY MEDICINE CLINIC | Facility: CLINIC | Age: 56
End: 2021-07-09

## 2021-07-09 VITALS — WEIGHT: 180.6 LBS | BODY MASS INDEX: 30.83 KG/M2 | HEIGHT: 64 IN

## 2021-07-09 DIAGNOSIS — E11.65 UNCONTROLLED TYPE 2 DIABETES MELLITUS WITH HYPERGLYCEMIA (HCC): Chronic | ICD-10-CM

## 2021-07-09 DIAGNOSIS — R23.3 BLEEDING SKIN MOLE: Primary | ICD-10-CM

## 2021-07-09 DIAGNOSIS — D22.5 MELANOCYTIC NEVUS OF TRUNK: ICD-10-CM

## 2021-07-09 DIAGNOSIS — I63.9 ACUTE CVA (CEREBROVASCULAR ACCIDENT) (HCC): ICD-10-CM

## 2021-07-09 DIAGNOSIS — N91.2 AMENORRHEA: ICD-10-CM

## 2021-07-09 DIAGNOSIS — D22.9 BLEEDING SKIN MOLE: Primary | ICD-10-CM

## 2021-07-09 DIAGNOSIS — I10 ESSENTIAL HYPERTENSION: ICD-10-CM

## 2021-07-09 PROCEDURE — 99213 OFFICE O/P EST LOW 20 MIN: CPT | Performed by: INTERNAL MEDICINE

## 2021-07-09 RX ORDER — ATORVASTATIN CALCIUM 80 MG/1
80 TABLET, FILM COATED ORAL NIGHTLY
Qty: 30 TABLET | Refills: 6 | Status: SHIPPED | OUTPATIENT
Start: 2021-07-09 | End: 2022-01-24 | Stop reason: SDUPTHER

## 2021-07-09 RX ORDER — CLOPIDOGREL BISULFATE 75 MG/1
75 TABLET ORAL DAILY
Qty: 30 TABLET
Start: 2021-07-09 | End: 2022-01-24 | Stop reason: SDUPTHER

## 2021-07-09 RX ORDER — MAGNESIUM OXIDE 400 MG/1
400 TABLET ORAL DAILY
Qty: 30 TABLET | Refills: 9 | Status: SHIPPED | OUTPATIENT
Start: 2021-07-09 | End: 2022-01-24 | Stop reason: SDUPTHER

## 2021-07-09 RX ORDER — LOSARTAN POTASSIUM 100 MG/1
100 TABLET ORAL DAILY
Qty: 30 TABLET | Refills: 6 | Status: SHIPPED | OUTPATIENT
Start: 2021-07-09 | End: 2022-01-24 | Stop reason: SDUPTHER

## 2021-07-09 RX ORDER — TERAZOSIN 2 MG/1
2 CAPSULE ORAL 2 TIMES DAILY
Qty: 180 CAPSULE | Refills: 3 | Status: SHIPPED | OUTPATIENT
Start: 2021-07-09 | End: 2022-01-24 | Stop reason: SDUPTHER

## 2021-07-09 RX ORDER — VENLAFAXINE HYDROCHLORIDE 75 MG/1
225 CAPSULE, EXTENDED RELEASE ORAL DAILY
Qty: 90 CAPSULE
Start: 2021-07-09 | End: 2022-01-24 | Stop reason: SDUPTHER

## 2021-07-09 RX ORDER — CARVEDILOL 6.25 MG/1
6.25 TABLET ORAL 2 TIMES DAILY WITH MEALS
Start: 2021-07-09 | End: 2022-01-24 | Stop reason: SDUPTHER

## 2021-07-09 RX ORDER — GLIPIZIDE 10 MG/1
TABLET, FILM COATED, EXTENDED RELEASE ORAL
Qty: 90 TABLET | Refills: 1 | Status: SHIPPED | OUTPATIENT
Start: 2021-07-09 | End: 2022-01-24 | Stop reason: SDUPTHER

## 2021-07-09 RX ORDER — DILTIAZEM HYDROCHLORIDE 300 MG/1
300 CAPSULE, COATED, EXTENDED RELEASE ORAL DAILY
Qty: 90 CAPSULE | Refills: 1 | Status: SHIPPED | OUTPATIENT
Start: 2021-07-09 | End: 2022-01-24 | Stop reason: SDUPTHER

## 2021-07-09 NOTE — ASSESSMENT & PLAN NOTE
This definitely warrants evaluation by dermatology likely biopsy given the spontaneous bleeding nature.  Dermatology referral for further evaluation

## 2021-07-09 NOTE — PROGRESS NOTES
"Rosanne Araya  1965  [id number]  Patient Care Team:  Neftali Mccain MD as PCP - General (Internal Medicine)  Gio Julian MD as Consulting Physician (Neurology)  Royal Lopez MD as Gynecologist (Gynecology)  Lauri Ventura MD as Cardiologist (Cardiology)    Rosanne Araya is a 55 y.o. female here today for follow up.     This patient is accompanied by their son who contributes to the history of their care.    Chief Complaint:    Chief Complaint   Patient presents with   • Suspicious Skin Lesion     upper back mole that is bleeding         History of Present Illness:  I have reviewed and/or updated the patient's past medical, past surgical, family, social history, problem list and allergies as appropriate.     She presents with multiple upper back left shoulder that has been bleeding.  She noted some blood on her brassiere strap.  There is been no increase in size.  She does not have a dermatologist.  She typically wears  sunscreen.  No family history of melanoma or skin cancer.  She does indicate that she has several darkly colored moles on her body.  This run however she is concerned as over the past week or so its began bleeding without any trauma.  No itching.  No other lesions.    Review of Systems:    Review of Systems   Skin: Positive for skin lesions.   Neurological: Negative.    Hematological: Negative.    Psychiatric/Behavioral: Negative.        Vitals:    07/09/21 1027   Weight: 81.9 kg (180 lb 9.6 oz)   Height: 162.6 cm (64.02\")   PainSc: 0-No pain     Body mass index is 30.98 kg/m².      Current Outpatient Medications:   •  aspirin 81 MG chewable tablet, Chew 1 tablet Daily., Disp:  , Rfl:   •  atorvastatin (LIPITOR) 80 MG tablet, Take 1 tablet by mouth Every Night., Disp: 30 tablet, Rfl: 6  •  carvedilol (COREG) 6.25 MG tablet, Take 1 tablet by mouth 2 (Two) Times a Day With Meals., Disp: , Rfl:   •  clopidogrel (PLAVIX) 75 MG tablet, Take 1 tablet by mouth Daily., Disp: " 30 tablet, Rfl:   •  dilTIAZem CD (CARDIZEM CD) 300 MG 24 hr capsule, Take 1 capsule by mouth Daily., Disp: 90 capsule, Rfl: 1  •  empagliflozin (Jardiance) 10 MG tablet tablet, Take 1 tablet by mouth Daily., Disp: 90 tablet, Rfl: 1  •  glipizide (GLUCOTROL XL) 10 MG 24 hr tablet, Take 10 mg daily in the morning, Disp: 90 tablet, Rfl: 1  •  glucose blood test strip, Check BS BID, Disp: 100 each, Rfl: 11  •  glucose monitor monitoring kit, Use BID to check BS, Disp: 1 each, Rfl: 0  •  Lancets misc, Check BS BID, Disp: 100 each, Rfl: 11  •  losartan (COZAAR) 100 MG tablet, Take 1 tablet by mouth Daily., Disp: 30 tablet, Rfl: 6  •  magnesium oxide (MAG-OX) 400 MG tablet, Take 1 tablet by mouth Daily., Disp: 30 tablet, Rfl: 9  •  metFORMIN (GLUCOPHAGE) 1000 MG tablet, Take 1 tablet by mouth 2 (Two) Times a Day With Meals., Disp: 180 tablet, Rfl: 1  •  terazosin (HYTRIN) 2 MG capsule, Take 1 capsule by mouth 2 (two) times a day., Disp: 180 capsule, Rfl: 3  •  venlafaxine XR (EFFEXOR-XR) 75 MG 24 hr capsule, Take 3 capsules by mouth Daily., Disp: 90 capsule, Rfl:     Physical Exam:    Physical Exam  Vitals and nursing note reviewed.   Constitutional:       General: She is not in acute distress.     Appearance: She is well-developed. She is not diaphoretic.   HENT:      Head: Normocephalic and atraumatic.      Right Ear: External ear normal.      Left Ear: External ear normal.      Mouth/Throat:      Pharynx: No oropharyngeal exudate.   Eyes:      General: No scleral icterus.        Right eye: No discharge.      Conjunctiva/sclera: Conjunctivae normal.   Neck:      Thyroid: No thyromegaly.      Vascular: No JVD.      Trachea: No tracheal deviation.   Cardiovascular:      Rate and Rhythm: Normal rate and regular rhythm.      Heart sounds: Normal heart sounds.      Comments: PMI nondisplaced  Pulmonary:      Effort: Pulmonary effort is normal.      Breath sounds: Normal breath sounds. No wheezing or rales.   Abdominal:       General: Bowel sounds are normal.      Palpations: Abdomen is soft.      Tenderness: There is no abdominal tenderness. There is no guarding or rebound.   Musculoskeletal:         General: Normal range of motion.      Cervical back: Normal range of motion and neck supple.      Comments: Normal gait   Lymphadenopathy:      Cervical: No cervical adenopathy.   Skin:     General: Skin is warm and dry.      Capillary Refill: Capillary refill takes less than 2 seconds.      Coloration: Skin is not pale.      Findings: Lesion present. No rash.      Comments: Is approximately 2 mm slightly irregularly bordered hyperpigmented lesion left scapula.  Other lesions in the vicinity as well.  There is no drainage at present.  No bleeding.   Neurological:      Mental Status: She is alert and oriented to person, place, and time.      Motor: No abnormal muscle tone.      Coordination: Coordination normal.   Psychiatric:         Judgment: Judgment normal.         Procedures    Results Review:    None    Assessment/Plan:     Problem List Items Addressed This Visit        Cardiac and Vasculature    Essential hypertension    Relevant Medications    carvedilol (COREG) 6.25 MG tablet    dilTIAZem CD (CARDIZEM CD) 300 MG 24 hr capsule    losartan (COZAAR) 100 MG tablet    terazosin (HYTRIN) 2 MG capsule       Endocrine and Metabolic    Uncontrolled type 2 diabetes mellitus with hyperglycemia (CMS/HCC) (Chronic)    Relevant Medications    glucose monitor monitoring kit    Lancets misc    glucose blood test strip    empagliflozin (Jardiance) 10 MG tablet tablet    glipizide (GLUCOTROL XL) 10 MG 24 hr tablet    metFORMIN (GLUCOPHAGE) 1000 MG tablet       Neuro    Acute CVA (cerebrovascular accident) (CMS/HCC)    Overview     · CT head 11/26/2020: No acute intracranial abnormality, age-related changes of the brain including likely chronic right frontal lobe lacunar infarct   · CT cerebral perfusion 11/26/2020: Findings consistent with focal  right PCA territory ischemia and larger area of slow flow.  No apparent core infarct.  · CTA head/neck (11/26/2020): Truncated appearance of the proximal right posterior cerebral artery consistent with patient's perfusion scan abnormality, potentially 50% or greater distal left cavernous carotid artery stenosis  · Echocardiogram (11/27/2020): LVEF 65%, LV wall thickness consistent with concentric hypertrophy, the cardiac valves are anatomically and functionally normal, saline test results negative  · Transcranial Doppler (11/27/2020): Normal mean flow velocities of bilateral MCA, terminal ICA.  No Doppler evidence suggestive of PFO  · Mobile cardiac outpatient telemetry monitor (12/14/2020): Normal monitor study  · Negative hypercoagulable work-up  · MRI brain 11/26/2020: Scattered small old vessel ischemic changes, no evidence of recent ischemia  · Mild residual left-sided weakness  · [facility] 3/11/2021 overnight admission for TIA in the setting of KENYA, dehydration  · CT head (3/11/2021):  Stable appearance of the brain with no evidence of acute intracranial abnormality  · Transcranial Doppler (3/11/2021): Normal mean flow velocities of bilateral MCA, terminal ICA.  No Doppler evidence suggestive of PFO  · MRI brain (3/11/2021): No acute infarction, mild to moderate chronic small vessel ischemic disease findings somewhat advanced for patient age, however, no focal or generalized atrophy  · KHURRAM (4/19/2021):   · Loop recorder implant ([facility]), 4/19/2021         Relevant Medications    aspirin 81 MG chewable tablet    atorvastatin (LIPITOR) 80 MG tablet    clopidogrel (PLAVIX) 75 MG tablet       Skin    Melanocytic nevus of trunk    Relevant Orders    Ambulatory Referral to Dermatology    Bleeding skin mole - Primary    Current Assessment & Plan     This definitely warrants evaluation by dermatology likely biopsy given the spontaneous bleeding nature.  Dermatology referral for further evaluation         Relevant Orders     Ambulatory Referral to Dermatology      Other Visit Diagnoses     Amenorrhea        Relevant Medications    venlafaxine XR (EFFEXOR-XR) 75 MG 24 hr capsule          Plan of care reviewed with patient at the conclusion of today's visit. Education was provided regarding diagnosis and management.  Patient verbalizes understanding of and agreement with management plan.    Return for Next scheduled follow up.    Neftali Mccain MD    Please note that portions of this note may have been completed with a voice recognition program. Efforts were made to edit the dictations, but occasionally words are mistranscribed.

## 2021-08-12 ENCOUNTER — LAB (OUTPATIENT)
Dept: LAB | Facility: HOSPITAL | Age: 56
End: 2021-08-12

## 2021-08-12 ENCOUNTER — OFFICE VISIT (OUTPATIENT)
Dept: ENDOCRINOLOGY | Facility: CLINIC | Age: 56
End: 2021-08-12

## 2021-08-12 VITALS
BODY MASS INDEX: 31.24 KG/M2 | DIASTOLIC BLOOD PRESSURE: 82 MMHG | WEIGHT: 183 LBS | HEART RATE: 75 BPM | SYSTOLIC BLOOD PRESSURE: 138 MMHG | OXYGEN SATURATION: 97 % | HEIGHT: 64 IN

## 2021-08-12 DIAGNOSIS — E11.29 MICROALBUMINURIA DUE TO TYPE 2 DIABETES MELLITUS (HCC): Chronic | ICD-10-CM

## 2021-08-12 DIAGNOSIS — E11.65 UNCONTROLLED TYPE 2 DIABETES MELLITUS WITH HYPERGLYCEMIA (HCC): Primary | Chronic | ICD-10-CM

## 2021-08-12 DIAGNOSIS — R80.9 MICROALBUMINURIA DUE TO TYPE 2 DIABETES MELLITUS (HCC): Chronic | ICD-10-CM

## 2021-08-12 LAB
EXPIRATION DATE: ABNORMAL
GLUCOSE BLDC GLUCOMTR-MCNC: 135 MG/DL (ref 70–130)
HBA1C MFR BLD: 6.7 %
Lab: ABNORMAL

## 2021-08-12 PROCEDURE — 82947 ASSAY GLUCOSE BLOOD QUANT: CPT | Performed by: PHYSICIAN ASSISTANT

## 2021-08-12 PROCEDURE — 82043 UR ALBUMIN QUANTITATIVE: CPT | Performed by: PHYSICIAN ASSISTANT

## 2021-08-12 PROCEDURE — 84443 ASSAY THYROID STIM HORMONE: CPT | Performed by: PHYSICIAN ASSISTANT

## 2021-08-12 PROCEDURE — 83036 HEMOGLOBIN GLYCOSYLATED A1C: CPT | Performed by: PHYSICIAN ASSISTANT

## 2021-08-12 PROCEDURE — 80053 COMPREHEN METABOLIC PANEL: CPT | Performed by: PHYSICIAN ASSISTANT

## 2021-08-12 PROCEDURE — 99214 OFFICE O/P EST MOD 30 MIN: CPT | Performed by: PHYSICIAN ASSISTANT

## 2021-08-12 PROCEDURE — 82570 ASSAY OF URINE CREATININE: CPT | Performed by: PHYSICIAN ASSISTANT

## 2021-08-12 NOTE — PROGRESS NOTES
Chief Complaint  F/u for Diabetes Mellitus.    HPI   Rosanne Araya is a 55 y.o. female who is here today for f/u of Diabetes Mellitus type 2. The initial diagnosis of diabetes was made 2010. Had GDM 2009.     Had a stroke 11/2020. No residual weakness.   Has been exercising. Endurance is better. Diet hasn't been as great.    A1C- 6.7 (8/12/21), 6.6 (2/24/2021), 8.7 (10/26/2020), 8.3 (8/5/2020)    Diabetic complications: CVA 11/2020  Eye exam current (within one year): updated 4/2021 at   Foot care and dental care: discussed    Current diabetic medications include:  Metformin 1000mg BID  Glipizide XL 10mg QD  Jardiance 10 mg daily (hx of CVA)    Statin: no    Past medications: Ozempic (nausea)    Diabetic Monitoring  -   No meter or log for review- she only had one episode when she felt hypoglycemic while swimming    The following portions of the patient's history were reviewed and updated by me as appropriate: allergies, current medications, past family history, past social history, past surgical history and problem list.        Past Medical History:   Diagnosis Date   • Depression    • Diabetes mellitus (CMS/HCC)    • Herpes    • Hypertension    • Stroke (cerebrum) (CMS/HCC)        Medications    Current Outpatient Medications:   •  aspirin 81 MG chewable tablet, Chew 1 tablet Daily., Disp:  , Rfl:   •  atorvastatin (LIPITOR) 80 MG tablet, Take 1 tablet by mouth Every Night., Disp: 30 tablet, Rfl: 6  •  carvedilol (COREG) 6.25 MG tablet, Take 1 tablet by mouth 2 (Two) Times a Day With Meals., Disp: , Rfl:   •  clopidogrel (PLAVIX) 75 MG tablet, Take 1 tablet by mouth Daily., Disp: 30 tablet, Rfl:   •  dilTIAZem CD (CARDIZEM CD) 300 MG 24 hr capsule, Take 1 capsule by mouth Daily., Disp: 90 capsule, Rfl: 1  •  empagliflozin (Jardiance) 10 MG tablet tablet, Take 1 tablet by mouth Daily., Disp: 90 tablet, Rfl: 1  •  glipizide (GLUCOTROL XL) 10 MG 24 hr tablet, Take 10 mg daily in the morning, Disp: 90 tablet,  "Rfl: 1  •  glucose blood test strip, Check BS BID, Disp: 100 each, Rfl: 11  •  glucose monitor monitoring kit, Use BID to check BS, Disp: 1 each, Rfl: 0  •  Lancets misc, Check BS BID, Disp: 100 each, Rfl: 11  •  losartan (COZAAR) 100 MG tablet, Take 1 tablet by mouth Daily., Disp: 30 tablet, Rfl: 6  •  magnesium oxide (MAG-OX) 400 MG tablet, Take 1 tablet by mouth Daily., Disp: 30 tablet, Rfl: 9  •  metFORMIN (GLUCOPHAGE) 1000 MG tablet, Take 1 tablet by mouth 2 (Two) Times a Day With Meals., Disp: 180 tablet, Rfl: 1  •  terazosin (HYTRIN) 2 MG capsule, Take 1 capsule by mouth 2 (two) times a day., Disp: 180 capsule, Rfl: 3  •  venlafaxine XR (EFFEXOR-XR) 75 MG 24 hr capsule, Take 3 capsules by mouth Daily., Disp: 90 capsule, Rfl:     Review of Systems  Review of Systems   Constitutional: Negative for fatigue and unexpected weight change.   HENT: Negative for trouble swallowing and voice change.    Eyes: Negative for visual disturbance.   Respiratory: Negative for cough, shortness of breath and wheezing.    Cardiovascular: Negative for chest pain.   Gastrointestinal: Negative for abdominal pain, nausea and vomiting.   Endocrine: Negative for cold intolerance, heat intolerance, polydipsia, polyphagia and polyuria.   Genitourinary: Negative for frequency.   Skin: Negative for pallor.   Neurological: Negative for dizziness, tremors, syncope and weakness.   Psychiatric/Behavioral: Negative for confusion and suicidal ideas.        Physical Exam    /82   Pulse 75   Ht 162.6 cm (64\")   Wt 83 kg (183 lb)   SpO2 97%   BMI 31.41 kg/m² Body mass index is 31.41 kg/m².  Physical Exam  Constitutional:       General: She is not in acute distress.     Appearance: She is well-developed. She is not diaphoretic.   HENT:      Head: Normocephalic.      Right Ear: External ear normal.      Left Ear: External ear normal.      Mouth/Throat:      Pharynx: No oropharyngeal exudate.   Eyes:      General: Lids are normal.         " Right eye: No discharge.         Left eye: No discharge.      Conjunctiva/sclera: Conjunctivae normal.      Pupils:      Right eye: Pupil is reactive.      Left eye: Pupil is reactive.   Neck:      Thyroid: No thyroid mass or thyromegaly.      Vascular: No JVD.      Trachea: No tracheal deviation.   Cardiovascular:      Rate and Rhythm: Normal rate and regular rhythm.      Heart sounds: Normal heart sounds. No murmur heard.     Pulmonary:      Effort: Pulmonary effort is normal. No respiratory distress.      Breath sounds: Normal breath sounds. No wheezing.   Abdominal:      General: Bowel sounds are normal.      Palpations: Abdomen is soft.      Tenderness: There is no abdominal tenderness.   Musculoskeletal:         General: No tenderness.   Lymphadenopathy:      Cervical: No cervical adenopathy.   Skin:     General: Skin is warm and dry.      Findings: No erythema or rash.   Neurological:      Mental Status: She is alert and oriented to person, place, and time.   Psychiatric:         Speech: Speech normal.         Behavior: Behavior normal.         Thought Content: Thought content normal.         Labs and Imaging   Lab Results   Component Value Date    HGBA1C 6.7 08/12/2021    HGBA1C 6.90 (H) 04/19/2021    HGBA1C 6.80 (H) 03/12/2021     Lipid Panel (04/19/2021 08:46)  Comprehensive Metabolic Panel (04/19/2021 08:46)  Hemoglobin A1c (04/19/2021 08:46)      Assessment / Plan   Diagnoses and all orders for this visit:    1. Uncontrolled type 2 diabetes mellitus with hyperglycemia (CMS/HCC) (Primary)  -     Microalbumin / Creatinine Urine Ratio - Urine, Clean Catch  -     Comprehensive Metabolic Panel  -     TSH  -     POC Glucose, Blood  -     POC Glycosylated Hemoglobin (Hb A1C)    2. Microalbuminuria due to type 2 diabetes mellitus (CMS/HCC)  -     Microalbumin / Creatinine Urine Ratio - Urine, Clean Catch        Diabetes Mellitus 2 is under good control.  -A1c 6.7, no hypoglycemia  -continue glipizide xl 10 mg  daily  -continue jardiance 10 mg daily  -continue metformin  -did not tolerate GLP-1 agonist in the past due to nausea  -eye exam updated 11/2020, foot exam was done 10/2020, labs are utd 4/2021    1.  Diet: 3-4 carb servings per meal for females, 4-5 carb servings per meal for males  Spread carb intake throughout the day  Increase lean protein and vegetable intake  Avoid sugary drinks and processed carbs including crackers, cookies, cakes  2.  Exercise: Recommend at least 30 minutes of exercise daily, at least 5 days per week. Increase exercise gradually.   3.  Blood Glucose Goal: Blood glucose goal <150 fasting, <180 2 hr postprandial  4.  Microalbumin due 8/2021  5.  Education performed during this visit: long term diabetic complications discussed. , annual eye examinations at Ophthalmology discussed, dental hygiene discussed  and foot care reviewed., home glucose monitoring emphasized, all medications, side effects and compliance discussed carefully and Hypoglycemia management and prevention reviewed. Reviewed ‘ABCs’ of diabetes management (respective goals in parentheses):  A1C (<7), blood pressure (<130/80), and cholesterol (LDL <100, if CVD <70).    Microalbuminuria  -repeat today  -she is on losartan    There are no Patient Instructions on file for this visit.    Follow up: Return in about 4 months (around 12/12/2021).    Discussed the nature of the disease including, risks, complications, implications, management, safe and proper use of medications. Encouraged therapeutic lifestyle changes including low calorie diet with plenty of fruits and vegetables, daily exercise, medication compliance, and keeping scheduled follow up appointments with me and any other providers. Encouraged patient to have appointment for complete physical, fasting labs, appropriate screenings, and immunizations on an annual basis.      Signed by: Addie Mckeon PA-C

## 2021-08-13 LAB
ALBUMIN SERPL-MCNC: 4.4 G/DL (ref 3.5–5.2)
ALBUMIN UR-MCNC: 6 MG/DL
ALBUMIN/GLOB SERPL: 1.6 G/DL
ALP SERPL-CCNC: 76 U/L (ref 39–117)
ALT SERPL W P-5'-P-CCNC: 7 U/L (ref 1–33)
ANION GAP SERPL CALCULATED.3IONS-SCNC: 12.8 MMOL/L (ref 5–15)
AST SERPL-CCNC: 17 U/L (ref 1–32)
BILIRUB SERPL-MCNC: 0.2 MG/DL (ref 0–1.2)
BUN SERPL-MCNC: 21 MG/DL (ref 6–20)
BUN/CREAT SERPL: 20.2 (ref 7–25)
CALCIUM SPEC-SCNC: 9.4 MG/DL (ref 8.6–10.5)
CHLORIDE SERPL-SCNC: 99 MMOL/L (ref 98–107)
CO2 SERPL-SCNC: 26.2 MMOL/L (ref 22–29)
CREAT SERPL-MCNC: 1.04 MG/DL (ref 0.57–1)
CREAT UR-MCNC: 82.7 MG/DL
GFR SERPL CREATININE-BSD FRML MDRD: 55 ML/MIN/1.73
GLOBULIN UR ELPH-MCNC: 2.7 GM/DL
GLUCOSE SERPL-MCNC: 115 MG/DL (ref 65–99)
MICROALBUMIN/CREAT UR: 72.6 MG/G
POTASSIUM SERPL-SCNC: 3.5 MMOL/L (ref 3.5–5.2)
PROT SERPL-MCNC: 7.1 G/DL (ref 6–8.5)
SODIUM SERPL-SCNC: 138 MMOL/L (ref 136–145)
TSH SERPL DL<=0.05 MIU/L-ACNC: 0.94 UIU/ML (ref 0.27–4.2)

## 2021-08-17 ENCOUNTER — OFFICE VISIT (OUTPATIENT)
Dept: OBSTETRICS AND GYNECOLOGY | Facility: CLINIC | Age: 56
End: 2021-08-17

## 2021-08-17 VITALS
BODY MASS INDEX: 31 KG/M2 | DIASTOLIC BLOOD PRESSURE: 90 MMHG | RESPIRATION RATE: 14 BRPM | SYSTOLIC BLOOD PRESSURE: 134 MMHG | WEIGHT: 181.6 LBS | HEIGHT: 64 IN

## 2021-08-17 DIAGNOSIS — Z87.42 HX OF VAGINAL BLEEDING: ICD-10-CM

## 2021-08-17 DIAGNOSIS — Z30.431 IUD CHECK UP: Primary | ICD-10-CM

## 2021-08-17 PROCEDURE — 99202 OFFICE O/P NEW SF 15 MIN: CPT | Performed by: OBSTETRICS & GYNECOLOGY

## 2021-08-17 RX ORDER — DILTIAZEM HYDROCHLORIDE 300 MG/1
CAPSULE, COATED, EXTENDED RELEASE ORAL
COMMUNITY
End: 2022-01-24 | Stop reason: SDUPTHER

## 2021-08-17 NOTE — PROGRESS NOTES
Subjective   Rosanne Araya is a 55 y.o. female is here today for follow-up.    Chief Complaint   Patient presents with   • Establish Care     Patient has an IUD that has been in over 5 years.        History of Present Illness  Patient had a Mirena IUD inserted about 5 years ago for menorrhagia.  This is worked well for menorrhalgia.  She also used initially for birth control.  Patient is still reporting monthly cycles with a min amount of bleeding each month.  She has breast changes and feels like she is still having menstrual cycle.  Patient presents today because she is worried the IUD has been in for 5 years.  She was informed that Mirena IUDs are now acceptable for 6 in this country and possibly 7 in [place].  The patient reports she is on her menstrual cycle now.  Patient is willing to monitor her vaginal bleeding and return in 2 months.  In November 2020 the patient had a cerebrovascular accident.  This was managed in [facility].  She has been on Plavix since this incident.  Patient is being treated for hypertension and type 2 diabetes  The following portions of the patient's history were reviewed and updated as appropriate: allergies, current medications, past family history, past medical history, past social history, past surgical history and problem list.    Review of Systems - History obtained from the patient  General ROS: positive for  - weight loss  Psychological ROS: positive for - anxiety and depression  ENT ROS: negative  Allergy and Immunology ROS: negative  Hematological and Lymphatic ROS: positive for - bruising  Endocrine ROS: positive for - Type 2 diabetes  Breast ROS: negative for breast lumps  Needs to schedule a mammogram  Respiratory ROS: no cough, shortness of breath, or wheezing  Cardiovascular ROS: no chest pain or dyspnea on exertion  Gastrointestinal ROS: no abdominal pain, change in bowel habits, or black or bloody stools  Genito-Urinary ROS: positive for -  incontinence  Musculoskeletal ROS: negative  Neurological ROS: positive for - CVA in 2020 in November, treated [facility]  Dermatological ROS: negative     Objective   General:  well developed; well nourished  no acute distress   Skin:  No suspicious lesions seen   Thyroid: not examined   Breasts:  Examined in supine position  Symmetric without masses or skin dimpling  Nipples normal without inversion, lesions or discharge  There are no palpable axillary nodes   Abdomen: soft, non-tender; no masses  no umbilical or inguinal hernias are present  no hepato-splenomegaly   Heart: regular rate and rhythm, S1, S2 normal, no murmur, click, rub or gallop   Lungs: clear to auscultation   Pelvis: Clinical staff was present for exam  External genitalia:  normal appearance of the external genitalia including Bartholin's and [place]'s glands.  :  urethral meatus normal;  Vaginal:  normal pink mucosa without prolapse or lesions. blood present -  small amount;  Cervix:  normal appearance. IUD string present - 1.5 cms in length;  Uterus:  normal size, shape and consistency.  Adnexa:  normal bimanual exam of the adnexa.  Rectal:  digital rectal exam not performed; anus visually normal appearing.         Assessment/Plan   Diagnoses and all orders for this visit:    1. IUD check up (Primary)    2. Hx of vaginal bleeding      Monitor vaginal bleeding  Return in 2 months    Mayur Dasilva MD

## 2021-11-19 PROCEDURE — 93298 REM INTERROG DEV EVAL SCRMS: CPT | Performed by: INTERNAL MEDICINE

## 2021-11-19 PROCEDURE — G2066 INTER DEVC REMOTE 30D: HCPCS | Performed by: INTERNAL MEDICINE

## 2022-01-24 ENCOUNTER — TELEPHONE (OUTPATIENT)
Dept: FAMILY MEDICINE CLINIC | Facility: CLINIC | Age: 57
End: 2022-01-24

## 2022-01-24 DIAGNOSIS — I63.9 ACUTE CVA (CEREBROVASCULAR ACCIDENT): ICD-10-CM

## 2022-01-24 DIAGNOSIS — E11.65 UNCONTROLLED TYPE 2 DIABETES MELLITUS WITH HYPERGLYCEMIA: Chronic | ICD-10-CM

## 2022-01-24 DIAGNOSIS — I10 ESSENTIAL HYPERTENSION: ICD-10-CM

## 2022-01-24 DIAGNOSIS — N91.2 AMENORRHEA: ICD-10-CM

## 2022-01-24 RX ORDER — CLOPIDOGREL BISULFATE 75 MG/1
75 TABLET ORAL DAILY
Qty: 15 TABLET | Refills: 0 | Status: SHIPPED | OUTPATIENT
Start: 2022-01-24 | End: 2022-01-26 | Stop reason: SDUPTHER

## 2022-01-24 RX ORDER — ATORVASTATIN CALCIUM 80 MG/1
80 TABLET, FILM COATED ORAL NIGHTLY
Qty: 30 TABLET | Refills: 6 | Status: SHIPPED | OUTPATIENT
Start: 2022-01-24 | End: 2022-01-24 | Stop reason: SDUPTHER

## 2022-01-24 RX ORDER — VENLAFAXINE HYDROCHLORIDE 75 MG/1
225 CAPSULE, EXTENDED RELEASE ORAL DAILY
Qty: 45 CAPSULE | Refills: 0 | Status: SHIPPED | OUTPATIENT
Start: 2022-01-24 | End: 2022-01-26 | Stop reason: SDUPTHER

## 2022-01-24 RX ORDER — DILTIAZEM HYDROCHLORIDE 300 MG/1
300 CAPSULE, COATED, EXTENDED RELEASE ORAL DAILY
Qty: 15 CAPSULE | Refills: 0 | Status: SHIPPED | OUTPATIENT
Start: 2022-01-24 | End: 2022-01-26

## 2022-01-24 RX ORDER — CARVEDILOL 6.25 MG/1
6.25 TABLET ORAL 2 TIMES DAILY WITH MEALS
Qty: 30 TABLET | Refills: 0 | Status: SHIPPED | OUTPATIENT
Start: 2022-01-24 | End: 2022-01-26 | Stop reason: SDUPTHER

## 2022-01-24 RX ORDER — GLIPIZIDE 10 MG/1
TABLET, FILM COATED, EXTENDED RELEASE ORAL
Qty: 15 TABLET | Refills: 0 | Status: SHIPPED | OUTPATIENT
Start: 2022-01-24 | End: 2022-01-26 | Stop reason: SDUPTHER

## 2022-01-24 RX ORDER — DILTIAZEM HYDROCHLORIDE 300 MG/1
300 CAPSULE, COATED, EXTENDED RELEASE ORAL DAILY
Qty: 15 CAPSULE | Refills: 0 | Status: SHIPPED | OUTPATIENT
Start: 2022-01-24 | End: 2022-01-26 | Stop reason: SDUPTHER

## 2022-01-24 RX ORDER — ATORVASTATIN CALCIUM 80 MG/1
80 TABLET, FILM COATED ORAL NIGHTLY
Qty: 30 TABLET | Refills: 6 | Status: SHIPPED | OUTPATIENT
Start: 2022-01-24 | End: 2022-01-26 | Stop reason: SDUPTHER

## 2022-01-24 RX ORDER — TERAZOSIN 2 MG/1
2 CAPSULE ORAL NIGHTLY
Qty: 15 CAPSULE | Refills: 0 | Status: SHIPPED | OUTPATIENT
Start: 2022-01-24 | End: 2022-01-26 | Stop reason: SDUPTHER

## 2022-01-24 RX ORDER — MAGNESIUM OXIDE 400 MG/1
400 TABLET ORAL DAILY
Qty: 15 TABLET | Refills: 0 | Status: SHIPPED | OUTPATIENT
Start: 2022-01-24 | End: 2022-02-08 | Stop reason: SDUPTHER

## 2022-01-24 RX ORDER — LOSARTAN POTASSIUM 100 MG/1
100 TABLET ORAL DAILY
Qty: 15 TABLET | Refills: 0 | Status: SHIPPED | OUTPATIENT
Start: 2022-01-24 | End: 2022-01-26 | Stop reason: SDUPTHER

## 2022-01-24 NOTE — TELEPHONE ENCOUNTER
Rx Refill Note  Requested Prescriptions     Pending Prescriptions Disp Refills   • atorvastatin (LIPITOR) 80 MG tablet 30 tablet 6     Sig: Take 1 tablet by mouth Every Night.      Last office visit with prescribing clinician: 7/9/2021      Next office visit with prescribing clinician: Visit date not found            Malu Banuelos MA  01/24/22, 10:25 EST

## 2022-01-24 NOTE — TELEPHONE ENCOUNTER
Rx Refill Note  Requested Prescriptions     Signed Prescriptions Disp Refills   • atorvastatin (LIPITOR) 80 MG tablet 30 tablet 6     Sig: Take 1 tablet by mouth Every Night.     Authorizing Provider: LUCILLE BUI     Ordering User: ANDERSON DOMINGUEZ   • carvedilol (COREG) 6.25 MG tablet 30 tablet 0     Sig: Take 1 tablet by mouth 2 (Two) Times a Day With Meals.     Authorizing Provider: LUCILLE BUI     Ordering User: ANDERSON DOMINGUEZ   • clopidogrel (PLAVIX) 75 MG tablet 15 tablet 0     Sig: Take 1 tablet by mouth Daily.     Authorizing Provider: LUCILLE BUI     Ordering User: ANDERSON DOMINGUEZ   • dilTIAZem CD (CARDIZEM CD) 300 MG 24 hr capsule 15 capsule 0     Sig: Take 1 capsule by mouth Daily.     Authorizing Provider: LUCILLE BUI     Ordering User: ANDERSON DOMINGUEZ   • dilTIAZem CD (Cartia XT) 300 MG 24 hr capsule 15 capsule 0     Sig: Take 1 capsule by mouth Daily.     Authorizing Provider: LUCILLE BUI     Ordering User: ANDERSON DOMINGUEZ   • empagliflozin (Jardiance) 10 MG tablet tablet 15 tablet 0     Sig: Take 1 tablet by mouth Daily.     Authorizing Provider: LUCILLE BUI     Ordering User: ANDERSON DOMINGUEZ   • glipizide (GLUCOTROL XL) 10 MG 24 hr tablet 15 tablet 0     Sig: Take 10 mg daily in the morning     Authorizing Provider: LUCILLE BUI     Ordering User: ANDERSON DOMINGUEZ   • losartan (COZAAR) 100 MG tablet 15 tablet 0     Sig: Take 1 tablet by mouth Daily.     Authorizing Provider: LUCILLE BUI     Ordering User: ANDERSON DOMINGUEZ   • magnesium oxide (MAG-OX) 400 MG tablet 15 tablet 0     Sig: Take 1 tablet by mouth Daily.     Authorizing Provider: LUCILLE BUI     Ordering User: ANDERSON DOMINGUEZ   • metFORMIN (GLUCOPHAGE) 1000 MG tablet 30 tablet 0     Sig: Take 1 tablet by mouth 2 (Two) Times a Day With Meals.     Authorizing Provider: LUCILLE BUI     Ordering User: ANDERSON DOMINGUEZ   • terazosin (HYTRIN) 2 MG capsule 15 capsule 0     Sig: Take 1 capsule  by mouth Every Night.     Authorizing Provider: LUCILLE BUI     Ordering User: MIYRAM MARTINEZ   • venlafaxine XR (EFFEXOR-XR) 75 MG 24 hr capsule 45 capsule 0     Sig: Take 3 capsules by mouth Daily.     Authorizing Provider: LUCILLE BUI     Ordering User: MIRYAM MARTINEZ      Last office visit with prescribing clinician: 7/9/2021      Next office visit with prescribing clinician: Visit date not found            Miryam Martinez MA  01/24/22, 16:37 EST     Attempted to contact, no answer LVM asking for return call to schedule upcoming appt. I have sent short term supply until patient scheduled upcoming appt

## 2022-01-24 NOTE — TELEPHONE ENCOUNTER
Caller: Rosanne Araya    Relationship: Self    Best call back number:     Requested Prescriptions:  PATIENT CALLED AND DISCONNECTED CALL, SAYING SHE NEEDED REFILLS ON ALL 14 OF HER MEDICATIONS, ADVISED THERE WERE MORE THAN 14 AND I NEEDED TO KNOW WHICH ONES SHE NEEDED SPECIFICALLY; PATIENT  ADVISED SHE HAD A STROKE AS A RESULT OF NOT HAVING HER MEDICATIONS       Pharmacy where request should be sent:      Additional details provided by patient:     Does the patient have less than a 3 day supply:  [] Yes  [] No    Argelia Jasso Rep   01/24/22 14:12 EST

## 2022-01-26 ENCOUNTER — OFFICE VISIT (OUTPATIENT)
Dept: FAMILY MEDICINE CLINIC | Facility: CLINIC | Age: 57
End: 2022-01-26

## 2022-01-26 VITALS
DIASTOLIC BLOOD PRESSURE: 86 MMHG | HEIGHT: 64 IN | HEART RATE: 75 BPM | BODY MASS INDEX: 31.65 KG/M2 | WEIGHT: 185.4 LBS | SYSTOLIC BLOOD PRESSURE: 132 MMHG | OXYGEN SATURATION: 97 %

## 2022-01-26 DIAGNOSIS — F41.9 ANXIETY AND DEPRESSION: ICD-10-CM

## 2022-01-26 DIAGNOSIS — E11.29 MICROALBUMINURIA DUE TO TYPE 2 DIABETES MELLITUS: Chronic | ICD-10-CM

## 2022-01-26 DIAGNOSIS — F32.A ANXIETY AND DEPRESSION: ICD-10-CM

## 2022-01-26 DIAGNOSIS — N91.2 AMENORRHEA: ICD-10-CM

## 2022-01-26 DIAGNOSIS — Z12.31 ENCOUNTER FOR SCREENING MAMMOGRAM FOR MALIGNANT NEOPLASM OF BREAST: ICD-10-CM

## 2022-01-26 DIAGNOSIS — E11.65 UNCONTROLLED TYPE 2 DIABETES MELLITUS WITH HYPERGLYCEMIA: Chronic | ICD-10-CM

## 2022-01-26 DIAGNOSIS — I63.9 ACUTE CVA (CEREBROVASCULAR ACCIDENT): ICD-10-CM

## 2022-01-26 DIAGNOSIS — R80.9 MICROALBUMINURIA DUE TO TYPE 2 DIABETES MELLITUS: Chronic | ICD-10-CM

## 2022-01-26 DIAGNOSIS — I10 ESSENTIAL HYPERTENSION: ICD-10-CM

## 2022-01-26 DIAGNOSIS — Z12.11 COLON CANCER SCREENING: Primary | ICD-10-CM

## 2022-01-26 PROCEDURE — 99214 OFFICE O/P EST MOD 30 MIN: CPT | Performed by: INTERNAL MEDICINE

## 2022-01-26 RX ORDER — VENLAFAXINE HYDROCHLORIDE 75 MG/1
225 CAPSULE, EXTENDED RELEASE ORAL DAILY
Qty: 45 CAPSULE | Refills: 0 | Status: SHIPPED | OUTPATIENT
Start: 2022-01-26 | End: 2022-02-08 | Stop reason: SDUPTHER

## 2022-01-26 RX ORDER — DILTIAZEM HYDROCHLORIDE 300 MG/1
300 CAPSULE, COATED, EXTENDED RELEASE ORAL DAILY
Qty: 15 CAPSULE | Refills: 0 | Status: SHIPPED | OUTPATIENT
Start: 2022-01-26 | End: 2022-02-08 | Stop reason: SDUPTHER

## 2022-01-26 RX ORDER — CLOPIDOGREL BISULFATE 75 MG/1
75 TABLET ORAL DAILY
Qty: 15 TABLET | Refills: 0 | Status: SHIPPED | OUTPATIENT
Start: 2022-01-26 | End: 2022-02-08 | Stop reason: SDUPTHER

## 2022-01-26 RX ORDER — LOSARTAN POTASSIUM 100 MG/1
100 TABLET ORAL DAILY
Qty: 15 TABLET | Refills: 0 | Status: SHIPPED | OUTPATIENT
Start: 2022-01-26 | End: 2022-02-08 | Stop reason: SDUPTHER

## 2022-01-26 RX ORDER — CARVEDILOL 6.25 MG/1
6.25 TABLET ORAL 2 TIMES DAILY WITH MEALS
Qty: 30 TABLET | Refills: 0 | Status: SHIPPED | OUTPATIENT
Start: 2022-01-26 | End: 2022-02-08 | Stop reason: SDUPTHER

## 2022-01-26 RX ORDER — TERAZOSIN 2 MG/1
2 CAPSULE ORAL NIGHTLY
Qty: 15 CAPSULE | Refills: 0 | Status: SHIPPED | OUTPATIENT
Start: 2022-01-26 | End: 2022-02-08 | Stop reason: SDUPTHER

## 2022-01-26 RX ORDER — ATORVASTATIN CALCIUM 80 MG/1
80 TABLET, FILM COATED ORAL NIGHTLY
Qty: 30 TABLET | Refills: 6 | Status: SHIPPED | OUTPATIENT
Start: 2022-01-26 | End: 2022-02-08 | Stop reason: SDUPTHER

## 2022-01-26 RX ORDER — GLIPIZIDE 10 MG/1
TABLET, FILM COATED, EXTENDED RELEASE ORAL
Qty: 15 TABLET | Refills: 0 | Status: SHIPPED | OUTPATIENT
Start: 2022-01-26 | End: 2022-02-08 | Stop reason: SDUPTHER

## 2022-01-26 NOTE — ASSESSMENT & PLAN NOTE
Patient's depression is single episode and is mild without psychosis. Their depression is currently in partial remission and the condition is unchanged. This will be reassessed at the next regular appointment. F/U as described:comtiue effexor.

## 2022-01-26 NOTE — ASSESSMENT & PLAN NOTE
Hypertension is unchanged.  Continue current treatment regimen.  Dietary sodium restriction.  Regular aerobic exercise.  Continue current medications.  Given her excess fatigue after her morning medications of asked her to take Cardizem and Cozaar at night to see if this has any impact.  Blood pressure will be reassessed at the next regular appointment.

## 2022-01-26 NOTE — PROGRESS NOTES
"Rosanne Araya  1965  [id number]  Patient Care Team:  Neftali Mccain MD as PCP - General (Internal Medicine)  Gio Julian MD as Consulting Physician (Neurology)  Royal Lopez MD as Gynecologist (Gynecology)  Lauri Ventura MD as Cardiologist (Cardiology)  Omer Hernandez MD as Consulting Physician (Dermatology)  Addie Mckeon PA-C as Physician Assistant (Endocrinology)    Rosanne Araya is a 56 y.o. female here today for follow up.     This patient is accompanied by their self who contributes to the history of their care.    Chief Complaint:    Chief Complaint   Patient presents with   • Hypertension     medication refills         History of Present Illness:  I have reviewed and/or updated the patient's past medical, past surgical, family, social history, problem list and allergies as appropriate.      Quite fatigued, she noted after inadvertantly skipping meds she felt better. Feels rest in am. Severe fatigue 2.5 hrs after taking. Takes all of her bp meds in am. Does not routinely snore, no witnessed apnea.Denies palpitations or chest ivania.  No hypoglycemia.  Continues to endocrine and reports glycemic control stable. Current with EYE exam 4/202 Is currently due for screening colonoscopy, mammogram for breast cancer screening as well as Pap and pelvic.       Review of Systems   Constitutional: Positive for fatigue.   Eyes: Negative.    Gastrointestinal: Negative.    Endocrine: Negative for cold intolerance and heat intolerance.   Genitourinary: Negative.    Musculoskeletal: Negative.    Neurological: Negative for numbness and headache.   Hematological: Negative for adenopathy. Does not bruise/bleed easily.   Psychiatric/Behavioral: Positive for sleep disturbance.       Vitals:    01/26/22 1127   BP: 132/86   Pulse: 75   SpO2: 97%   Weight: 84.1 kg (185 lb 6.4 oz)   Height: 162.6 cm (64.02\")     Body mass index is 31.81 kg/m².    Physical Exam  Cardiovascular:      Pulses:           " Dorsalis pedis pulses are 2+ on the right side and 2+ on the left side.        Posterior tibial pulses are 2+ on the right side and 2+ on the left side.   Feet:      Right foot:      Protective Sensation: 10 sites tested.      Skin integrity: Skin integrity normal.      Toenail Condition: Right toenails are normal.      Left foot:      Protective Sensation: 10 sites tested. 10 sites sensed.      Skin integrity: Skin integrity normal.      Toenail Condition: Left toenails are normal.         Procedures    Results Review:    None     Assessment/Plan:     Problem List Items Addressed This Visit        Cardiac and Vasculature    Essential hypertension    Current Assessment & Plan     Hypertension is unchanged.  Continue current treatment regimen.  Dietary sodium restriction.  Regular aerobic exercise.  Continue current medications.  Given her excess fatigue after her morning medications of asked her to take Cardizem and Cozaar at night to see if this has any impact.  Blood pressure will be reassessed at the next regular appointment.         Relevant Medications    carvedilol (COREG) 6.25 MG tablet    dilTIAZem CD (Cartia XT) 300 MG 24 hr capsule    losartan (COZAAR) 100 MG tablet    terazosin (HYTRIN) 2 MG capsule       Endocrine and Metabolic    Uncontrolled type 2 diabetes mellitus with hyperglycemia (HCC) (Chronic)    Relevant Medications    glucose monitor monitoring kit    Lancets misc    glucose blood test strip    empagliflozin (Jardiance) 10 MG tablet tablet    glipizide (GLUCOTROL XL) 10 MG 24 hr tablet    metFORMIN (GLUCOPHAGE) 1000 MG tablet    Microalbuminuria due to type 2 diabetes mellitus (HCC) (Chronic)    Current Assessment & Plan     Renal condition is unchanged.  Continue current treatment regimen.  Renal condition will be reassessed in 6 months.         Relevant Medications    empagliflozin (Jardiance) 10 MG tablet tablet    glipizide (GLUCOTROL XL) 10 MG 24 hr tablet    metFORMIN (GLUCOPHAGE) 1000  MG tablet       Mental Health    Anxiety and depression    Current Assessment & Plan     Patient's depression is single episode and is mild without psychosis. Their depression is currently in partial remission and the condition is unchanged. This will be reassessed at the next regular appointment. F/U as described:comtiue effexor.         Relevant Medications    venlafaxine XR (EFFEXOR-XR) 75 MG 24 hr capsule       Neuro    Acute CVA (cerebrovascular accident) (HCC)    Overview     · CT head 11/26/2020: No acute intracranial abnormality, age-related changes of the brain including likely chronic right frontal lobe lacunar infarct   · CT cerebral perfusion 11/26/2020: Findings consistent with focal right PCA territory ischemia and larger area of slow flow.  No apparent core infarct.  · CTA head/neck (11/26/2020): Truncated appearance of the proximal right posterior cerebral artery consistent with patient's perfusion scan abnormality, potentially 50% or greater distal left cavernous carotid artery stenosis  · Echocardiogram (11/27/2020): LVEF 65%, LV wall thickness consistent with concentric hypertrophy, the cardiac valves are anatomically and functionally normal, saline test results negative  · Transcranial Doppler (11/27/2020): Normal mean flow velocities of bilateral MCA, terminal ICA.  No Doppler evidence suggestive of PFO  · Mobile cardiac outpatient telemetry monitor (12/14/2020): Normal monitor study  · Negative hypercoagulable work-up  · MRI brain 11/26/2020: Scattered small old vessel ischemic changes, no evidence of recent ischemia  · Mild residual left-sided weakness  · [facility] 3/11/2021 overnight admission for TIA in the setting of KENYA, dehydration  · CT head (3/11/2021):  Stable appearance of the brain with no evidence of acute intracranial abnormality  · Transcranial Doppler (3/11/2021): Normal mean flow velocities of bilateral MCA, terminal ICA.  No Doppler evidence suggestive of PFO  · MRI brain  (3/11/2021): No acute infarction, mild to moderate chronic small vessel ischemic disease findings somewhat advanced for patient age, however, no focal or generalized atrophy  · K[facility]URRAM (4/19/2021):   · Loop recorder implant ([facility]), 4/19/2021         Relevant Medications    aspirin 81 MG chewable tablet    atorvastatin (LIPITOR) 80 MG tablet    clopidogrel (PLAVIX) 75 MG tablet      Other Visit Diagnoses     Colon cancer screening    -  Primary    Relevant Orders    Ambulatory Referral For Screening Colonoscopy    Amenorrhea        Relevant Medications    venlafaxine XR (EFFEXOR-XR) 75 MG 24 hr capsule    Other Relevant Orders    Ambulatory Referral to Gynecology    Encounter for screening mammogram for malignant neoplasm of breast        Relevant Orders    Mammo Screening Digital Tomosynthesis Bilateral With CAD          Plan of care reviewed with patient at the conclusion of today's visit. Education was provided regarding diagnosis and management.  Patient verbalizes understanding of and agreement with management plan.    Return in about 6 months (around 7/26/2022) for htn, Annual.    Neftali Mccain MD      Please note than portions of this note were completed wth a Voice Recognition Program

## 2022-01-31 PROCEDURE — 93298 REM INTERROG DEV EVAL SCRMS: CPT | Performed by: INTERNAL MEDICINE

## 2022-01-31 PROCEDURE — G2066 INTER DEVC REMOTE 30D: HCPCS | Performed by: INTERNAL MEDICINE

## 2022-02-02 ENCOUNTER — HOSPITAL ENCOUNTER (OUTPATIENT)
Dept: MAMMOGRAPHY | Facility: HOSPITAL | Age: 57
Discharge: HOME | End: 2022-02-02
Admitting: INTERNAL MEDICINE

## 2022-02-02 ENCOUNTER — APPOINTMENT (OUTPATIENT)
Dept: OTHER | Facility: HOSPITAL | Age: 57
End: 2022-02-02

## 2022-02-02 DIAGNOSIS — Z12.31 ENCOUNTER FOR SCREENING MAMMOGRAM FOR MALIGNANT NEOPLASM OF BREAST: ICD-10-CM

## 2022-02-02 PROCEDURE — 77067 SCR MAMMO BI INCL CAD: CPT | Performed by: RADIOLOGY

## 2022-02-02 PROCEDURE — 77063 BREAST TOMOSYNTHESIS BI: CPT

## 2022-02-02 PROCEDURE — 77063 BREAST TOMOSYNTHESIS BI: CPT | Performed by: RADIOLOGY

## 2022-02-02 PROCEDURE — 77067 SCR MAMMO BI INCL CAD: CPT

## 2022-02-08 DIAGNOSIS — N91.2 AMENORRHEA: ICD-10-CM

## 2022-02-08 DIAGNOSIS — I63.9 ACUTE CVA (CEREBROVASCULAR ACCIDENT): ICD-10-CM

## 2022-02-08 DIAGNOSIS — E11.65 UNCONTROLLED TYPE 2 DIABETES MELLITUS WITH HYPERGLYCEMIA: Chronic | ICD-10-CM

## 2022-02-08 DIAGNOSIS — I10 ESSENTIAL HYPERTENSION: ICD-10-CM

## 2022-02-09 ENCOUNTER — TELEPHONE (OUTPATIENT)
Dept: FAMILY MEDICINE CLINIC | Facility: CLINIC | Age: 57
End: 2022-02-09

## 2022-02-09 ENCOUNTER — APPOINTMENT (OUTPATIENT)
Dept: GENERAL RADIOLOGY | Facility: HOSPITAL | Age: 57
End: 2022-02-09

## 2022-02-09 ENCOUNTER — HOSPITAL ENCOUNTER (EMERGENCY)
Facility: HOSPITAL | Age: 57
Discharge: LEFT BEFORE DISCHARGE | End: 2022-02-09

## 2022-02-09 VITALS
SYSTOLIC BLOOD PRESSURE: 178 MMHG | HEART RATE: 72 BPM | OXYGEN SATURATION: 97 % | BODY MASS INDEX: 29.88 KG/M2 | TEMPERATURE: 98.7 F | RESPIRATION RATE: 20 BRPM | DIASTOLIC BLOOD PRESSURE: 98 MMHG | HEIGHT: 64 IN | WEIGHT: 175 LBS

## 2022-02-09 LAB
ALBUMIN SERPL-MCNC: 4.2 G/DL (ref 3.5–5.2)
ALBUMIN/GLOB SERPL: 1.4 G/DL
ALP SERPL-CCNC: 81 U/L (ref 39–117)
ALT SERPL W P-5'-P-CCNC: 7 U/L (ref 1–33)
ANION GAP SERPL CALCULATED.3IONS-SCNC: 16 MMOL/L (ref 5–15)
AST SERPL-CCNC: 15 U/L (ref 1–32)
BASOPHILS # BLD AUTO: 0.06 10*3/MM3 (ref 0–0.2)
BASOPHILS NFR BLD AUTO: 0.4 % (ref 0–1.5)
BILIRUB SERPL-MCNC: 0.8 MG/DL (ref 0–1.2)
BUN SERPL-MCNC: 16 MG/DL (ref 6–20)
BUN/CREAT SERPL: 20.5 (ref 7–25)
CALCIUM SPEC-SCNC: 9.2 MG/DL (ref 8.6–10.5)
CHLORIDE SERPL-SCNC: 99 MMOL/L (ref 98–107)
CO2 SERPL-SCNC: 21 MMOL/L (ref 22–29)
CREAT SERPL-MCNC: 0.78 MG/DL (ref 0.57–1)
DEPRECATED RDW RBC AUTO: 41 FL (ref 37–54)
EOSINOPHIL # BLD AUTO: 0.05 10*3/MM3 (ref 0–0.4)
EOSINOPHIL NFR BLD AUTO: 0.3 % (ref 0.3–6.2)
ERYTHROCYTE [DISTWIDTH] IN BLOOD BY AUTOMATED COUNT: 12.7 % (ref 12.3–15.4)
GFR SERPL CREATININE-BSD FRML MDRD: 77 ML/MIN/1.73
GLOBULIN UR ELPH-MCNC: 2.9 GM/DL
GLUCOSE SERPL-MCNC: 195 MG/DL (ref 65–99)
HCT VFR BLD AUTO: 41.8 % (ref 34–46.6)
HGB BLD-MCNC: 14.2 G/DL (ref 12–15.9)
HOLD SPECIMEN: NORMAL
IMM GRANULOCYTES # BLD AUTO: 0.06 10*3/MM3 (ref 0–0.05)
IMM GRANULOCYTES NFR BLD AUTO: 0.4 % (ref 0–0.5)
LIPASE SERPL-CCNC: 17 U/L (ref 13–60)
LYMPHOCYTES # BLD AUTO: 1.82 10*3/MM3 (ref 0.7–3.1)
LYMPHOCYTES NFR BLD AUTO: 12.4 % (ref 19.6–45.3)
MCH RBC QN AUTO: 29.8 PG (ref 26.6–33)
MCHC RBC AUTO-ENTMCNC: 34 G/DL (ref 31.5–35.7)
MCV RBC AUTO: 87.8 FL (ref 79–97)
MONOCYTES # BLD AUTO: 0.99 10*3/MM3 (ref 0.1–0.9)
MONOCYTES NFR BLD AUTO: 6.8 % (ref 5–12)
NEUTROPHILS NFR BLD AUTO: 11.68 10*3/MM3 (ref 1.7–7)
NEUTROPHILS NFR BLD AUTO: 79.7 % (ref 42.7–76)
NRBC BLD AUTO-RTO: 0 /100 WBC (ref 0–0.2)
PLATELET # BLD AUTO: 355 10*3/MM3 (ref 140–450)
PMV BLD AUTO: 10.2 FL (ref 6–12)
POTASSIUM SERPL-SCNC: 3.6 MMOL/L (ref 3.5–5.2)
PROT SERPL-MCNC: 7.1 G/DL (ref 6–8.5)
RBC # BLD AUTO: 4.76 10*6/MM3 (ref 3.77–5.28)
SODIUM SERPL-SCNC: 136 MMOL/L (ref 136–145)
TROPONIN T SERPL-MCNC: <0.01 NG/ML (ref 0–0.03)
WBC NRBC COR # BLD: 14.66 10*3/MM3 (ref 3.4–10.8)
WHOLE BLOOD HOLD SPECIMEN: NORMAL
WHOLE BLOOD HOLD SPECIMEN: NORMAL

## 2022-02-09 PROCEDURE — 85025 COMPLETE CBC W/AUTO DIFF WBC: CPT

## 2022-02-09 PROCEDURE — 93005 ELECTROCARDIOGRAM TRACING: CPT

## 2022-02-09 PROCEDURE — 84484 ASSAY OF TROPONIN QUANT: CPT

## 2022-02-09 PROCEDURE — 80053 COMPREHEN METABOLIC PANEL: CPT

## 2022-02-09 PROCEDURE — 99211 OFF/OP EST MAY X REQ PHY/QHP: CPT

## 2022-02-09 PROCEDURE — 83690 ASSAY OF LIPASE: CPT

## 2022-02-09 RX ORDER — VENLAFAXINE HYDROCHLORIDE 75 MG/1
225 CAPSULE, EXTENDED RELEASE ORAL DAILY
Qty: 90 CAPSULE | Refills: 1 | Status: SHIPPED | OUTPATIENT
Start: 2022-02-09 | End: 2022-05-23 | Stop reason: SDUPTHER

## 2022-02-09 RX ORDER — GLIPIZIDE 10 MG/1
TABLET, FILM COATED, EXTENDED RELEASE ORAL
Qty: 90 TABLET | Refills: 1 | Status: SHIPPED | OUTPATIENT
Start: 2022-02-09 | End: 2022-05-23 | Stop reason: SDUPTHER

## 2022-02-09 RX ORDER — ATORVASTATIN CALCIUM 80 MG/1
80 TABLET, FILM COATED ORAL NIGHTLY
Qty: 90 TABLET | Refills: 3 | Status: SHIPPED | OUTPATIENT
Start: 2022-02-09 | End: 2022-05-23 | Stop reason: SDUPTHER

## 2022-02-09 RX ORDER — LANCETS 30 GAUGE
EACH MISCELLANEOUS
Qty: 100 EACH | Refills: 1 | Status: SHIPPED | OUTPATIENT
Start: 2022-02-09

## 2022-02-09 RX ORDER — CARVEDILOL 6.25 MG/1
6.25 TABLET ORAL 2 TIMES DAILY WITH MEALS
Qty: 90 TABLET | Refills: 1 | Status: SHIPPED | OUTPATIENT
Start: 2022-02-09 | End: 2022-06-03

## 2022-02-09 RX ORDER — DILTIAZEM HYDROCHLORIDE 300 MG/1
300 CAPSULE, COATED, EXTENDED RELEASE ORAL DAILY
Qty: 90 CAPSULE | Refills: 1 | Status: SHIPPED | OUTPATIENT
Start: 2022-02-09 | End: 2022-05-23 | Stop reason: SDUPTHER

## 2022-02-09 RX ORDER — TERAZOSIN 2 MG/1
2 CAPSULE ORAL NIGHTLY
Qty: 90 CAPSULE | Refills: 1 | Status: SHIPPED | OUTPATIENT
Start: 2022-02-09 | End: 2022-05-23 | Stop reason: SDUPTHER

## 2022-02-09 RX ORDER — SODIUM CHLORIDE 0.9 % (FLUSH) 0.9 %
10 SYRINGE (ML) INJECTION AS NEEDED
Status: DISCONTINUED | OUTPATIENT
Start: 2022-02-09 | End: 2022-02-09 | Stop reason: HOSPADM

## 2022-02-09 RX ORDER — MAGNESIUM OXIDE 400 MG/1
400 TABLET ORAL DAILY
Qty: 90 TABLET | Refills: 1 | Status: SHIPPED | OUTPATIENT
Start: 2022-02-09 | End: 2022-05-23

## 2022-02-09 RX ORDER — CLOPIDOGREL BISULFATE 75 MG/1
75 TABLET ORAL DAILY
Qty: 90 TABLET | Refills: 1 | Status: SHIPPED | OUTPATIENT
Start: 2022-02-09 | End: 2022-05-23 | Stop reason: SDUPTHER

## 2022-02-09 RX ORDER — ASPIRIN 81 MG/1
81 TABLET, CHEWABLE ORAL DAILY
Qty: 90 TABLET | Refills: 1 | Status: SHIPPED | OUTPATIENT
Start: 2022-02-09 | End: 2022-09-29 | Stop reason: SDUPTHER

## 2022-02-09 RX ORDER — BLOOD-GLUCOSE METER
KIT MISCELLANEOUS
Qty: 1 EACH | Refills: 1 | Status: SHIPPED | OUTPATIENT
Start: 2022-02-09

## 2022-02-09 RX ORDER — LOSARTAN POTASSIUM 100 MG/1
100 TABLET ORAL DAILY
Qty: 90 TABLET | Refills: 1 | Status: SHIPPED | OUTPATIENT
Start: 2022-02-09 | End: 2022-05-23 | Stop reason: SDUPTHER

## 2022-02-09 NOTE — TELEPHONE ENCOUNTER
Caller: Rosanne Araya    Relationship to patient: Self    Best call back number: [phone]    Chief complaint: RIGHT UPPER QUADRANT PAIN, NAUSEA AND VOMITING    Type of visit: HOSPITAL FOLLOW UP    Requested date: ASAP     If rescheduling, when is the original appointment:     Additional notes: WHITE BLOOD CELL COUNT IS ELEVATED. PATIENT WAS SEEN AT ED 2/9/22 AND WAS NOT ABLE TO BE ROOMED DUE TO ALL BEDS BEING FULL AT THE TIME. PATIENT WAS OFFERED AN APPOINTMENT FOR 2/16/22 BUT WOULD LIKE TO REQUEST A SOONER APPOINTMENT DUE TO DISCOMFORT.

## 2022-02-10 ENCOUNTER — OFFICE VISIT (OUTPATIENT)
Dept: FAMILY MEDICINE CLINIC | Facility: CLINIC | Age: 57
End: 2022-02-10

## 2022-02-10 VITALS
BODY MASS INDEX: 30.9 KG/M2 | HEART RATE: 74 BPM | TEMPERATURE: 98.4 F | SYSTOLIC BLOOD PRESSURE: 122 MMHG | HEIGHT: 64 IN | DIASTOLIC BLOOD PRESSURE: 76 MMHG | WEIGHT: 181 LBS

## 2022-02-10 DIAGNOSIS — R11.14 BILIOUS VOMITING WITH NAUSEA: ICD-10-CM

## 2022-02-10 DIAGNOSIS — R10.13 EPIGASTRIC PAIN: ICD-10-CM

## 2022-02-10 DIAGNOSIS — R50.9 FEVER, UNSPECIFIED FEVER CAUSE: ICD-10-CM

## 2022-02-10 DIAGNOSIS — R10.11 RUQ PAIN: Primary | ICD-10-CM

## 2022-02-10 DIAGNOSIS — R19.5 PALE STOOL: ICD-10-CM

## 2022-02-10 PROCEDURE — 99214 OFFICE O/P EST MOD 30 MIN: CPT | Performed by: PHYSICIAN ASSISTANT

## 2022-02-10 RX ORDER — ONDANSETRON 4 MG/1
4 TABLET, ORALLY DISINTEGRATING ORAL EVERY 8 HOURS PRN
Qty: 30 TABLET | Refills: 0 | Status: SHIPPED | OUTPATIENT
Start: 2022-02-10 | End: 2022-05-23

## 2022-02-10 NOTE — TELEPHONE ENCOUNTER
Called patient twice to schedule an appointment, no answer and mailbox is full to leave a message.

## 2022-02-10 NOTE — TELEPHONE ENCOUNTER
Contacted patient and scheduled upcoming appt with another provider on staff. She went to ED yesterday but was left because of the long wait.

## 2022-02-10 NOTE — PROGRESS NOTES
Chief Complaint   Patient presents with   • Abdominal Pain      night gallbladder attack went to ER yesterday but didnt stay due to being busy. finally keeping liquids down, has some soreness       HPI      Rosanne Araya is a 56 y.o. female who presents for Abdominal Pain ( night gallbladder attack went to ER yesterday but didnt stay due to being busy. finally keeping liquids down, has some soreness)    Patient presents with abdominal pain that started  night.  She has severe right upper quadrant pain with radiation into her back, nausea and vomiting, fever and chills.  Went to ER but was waiting for a few hours and started to feel better, so went home.  Reviewed labs from [facility] ER and they show elevated WBC with normal lipase, electrolytes, kidney and liver function.  Having ongoing pain with nausea/vomiting/fever/chills.  Able to tolerate liquids.  Has only had applesauce.  Baseline constipation.  Pale stools recently.   present at appointment.    Past Medical History:   Diagnosis Date   • Depression    • Diabetes mellitus (HCC)    • Herpes    • Hypertension    • IUD (intrauterine device) in place     due to menorrhagia   • Stroke (cerebrum) (HCC)        Past Surgical History:   Procedure Laterality Date   • BACK SURGERY      disc slipped   • CARDIAC ELECTROPHYSIOLOGY PROCEDURE N/A 2021    Procedure: Loop insertion- getting c19 elsewhere in [place] and aware to do on  and bring results;  Surgeon: Adonis Eng IV, MD;  Location: [facility] INVASIVE LOCATION;  Service: Cardiovascular;  Laterality: N/A;   •  SECTION     • TONSILLECTOMY AND ADENOIDECTOMY     • WISDOM TOOTH EXTRACTION         Family History   Problem Relation Age of Onset   • Thyroid disease Mother    • Breast cancer Maternal Aunt 45   • Hypertension Maternal Grandmother    • Diabetes Maternal Grandmother    • Stroke Maternal Grandmother    • Heart attack Maternal Grandmother    •  "Hypertension Maternal Grandfather    • Diabetes Maternal Grandfather    • Hypertension Paternal Grandmother    • Diabetes Paternal Grandmother    • Heart failure Paternal Grandmother    • Hypertension Paternal Grandfather    • Heart attack Paternal Grandfather    • Ovarian cancer Neg Hx        Social History     Socioeconomic History   • Marital status:    Tobacco Use   • Smoking status: Former Smoker     Types: Cigarettes     Quit date:      Years since quittin.1   • Smokeless tobacco: Never Used   Vaping Use   • Vaping Use: Never used   Substance and Sexual Activity   • Alcohol use: Not Currently   • Drug use: Not Currently     Types: Marijuana     Comment: once weekly, last use 22   • Sexual activity: Yes     Partners: Male     Birth control/protection: I.U.D.       No Known Allergies    ROS    Review of Systems   Constitutional: Positive for chills, fatigue and fever.   Respiratory: Negative for cough and shortness of breath.    Cardiovascular: Negative for chest pain.   Gastrointestinal: Positive for abdominal pain, constipation, nausea and vomiting. Negative for blood in stool.   Genitourinary: Negative for dysuria, frequency, hematuria and urgency.   Musculoskeletal: Positive for back pain.       Vitals:    02/10/22 1302   BP: 122/76   Pulse: 74   Temp: 98.4 °F (36.9 °C)   Weight: 82.1 kg (181 lb)   Height: 162.6 cm (64.02\")     Body mass index is 31.05 kg/m².    Current Outpatient Medications on File Prior to Visit   Medication Sig Dispense Refill   • aspirin 81 MG chewable tablet Chew 1 tablet Daily. 90 tablet 1   • atorvastatin (LIPITOR) 80 MG tablet Take 1 tablet by mouth Every Night. 90 tablet 3   • carvedilol (COREG) 6.25 MG tablet Take 1 tablet by mouth 2 (Two) Times a Day With Meals. 90 tablet 1   • clopidogrel (PLAVIX) 75 MG tablet Take 1 tablet by mouth Daily. 90 tablet 1   • dilTIAZem CD (Cartia XT) 300 MG 24 hr capsule Take 1 capsule by mouth Daily. 90 capsule 1   • " empagliflozin (Jardiance) 10 MG tablet tablet Take 1 tablet by mouth Daily. 90 tablet 1   • glucose monitor monitoring kit Use BID to check BS 1 each 1   • Lancets misc Check BS  each 1   • losartan (COZAAR) 100 MG tablet Take 1 tablet by mouth Daily. 90 tablet 1   • magnesium oxide (MAG-OX) 400 MG tablet Take 1 tablet by mouth Daily. 90 tablet 1   • terazosin (HYTRIN) 2 MG capsule Take 1 capsule by mouth Every Night. 90 capsule 1   • venlafaxine XR (EFFEXOR-XR) 75 MG 24 hr capsule Take 3 capsules by mouth Daily. 90 capsule 1   • glipizide (GLUCOTROL XL) 10 MG 24 hr tablet Take 10 mg daily in the morning 90 tablet 1   • glucose blood test strip Check BS  each 1   • metFORMIN (GLUCOPHAGE) 1000 MG tablet Take 1 tablet by mouth 2 (Two) Times a Day With Meals. 90 tablet 1     Current Facility-Administered Medications on File Prior to Visit   Medication Dose Route Frequency Provider Last Rate Last Admin   • [DISCONTINUED] sodium chloride 0.9 % flush 10 mL  10 mL Intravenous PRN Emergency, Triage Protocol, MD           Results for orders placed or performed in visit on 08/12/21   Microalbumin / Creatinine Urine Ratio - Urine, Clean Catch    Specimen: Urine, Clean Catch   Result Value Ref Range    Microalbumin/Creatinine Ratio 72.6 mg/g    Creatinine, Urine 82.7 mg/dL    Microalbumin, Urine 6.0 mg/dL   Comprehensive Metabolic Panel    Specimen: Blood   Result Value Ref Range    Glucose 115 (H) 65 - 99 mg/dL    BUN 21 (H) 6 - 20 mg/dL    Creatinine 1.04 (H) 0.57 - 1.00 mg/dL    Sodium 138 136 - 145 mmol/L    Potassium 3.5 3.5 - 5.2 mmol/L    Chloride 99 98 - 107 mmol/L    CO2 26.2 22.0 - 29.0 mmol/L    Calcium 9.4 8.6 - 10.5 mg/dL    Total Protein 7.1 6.0 - 8.5 g/dL    Albumin 4.40 3.50 - 5.20 g/dL    ALT (SGPT) 7 1 - 33 U/L    AST (SGOT) 17 1 - 32 U/L    Alkaline Phosphatase 76 39 - 117 U/L    Total Bilirubin 0.2 0.0 - 1.2 mg/dL    eGFR Non African Amer 55 (L) >60 mL/min/1.73    Globulin 2.7 gm/dL    A/G  Ratio 1.6 g/dL    BUN/Creatinine Ratio 20.2 7.0 - 25.0    Anion Gap 12.8 5.0 - 15.0 mmol/L   TSH    Specimen: Blood   Result Value Ref Range    TSH 0.942 0.270 - 4.200 uIU/mL   POC Glucose, Blood    Specimen: Blood   Result Value Ref Range    Glucose 135 (A) 70 - 130 mg/dL    Lot Number [id number]     Expiration Date 02/05/2022    POC Glycosylated Hemoglobin (Hb A1C)    Specimen: Blood   Result Value Ref Range    Hemoglobin A1C 6.7 %       PE    Physical Exam  Vitals reviewed.   Constitutional:       General: She is not in acute distress.     Appearance: Normal appearance. She is well-developed. She is obese. She is ill-appearing. She is not diaphoretic.   HENT:      Head: Normocephalic and atraumatic.   Eyes:      Extraocular Movements: Extraocular movements intact.      Conjunctiva/sclera: Conjunctivae normal.   Pulmonary:      Effort: No respiratory distress.   Abdominal:      General: Abdomen is flat. Bowel sounds are decreased.      Palpations: Abdomen is soft.      Tenderness: There is abdominal tenderness in the right upper quadrant and epigastric area. There is guarding.   Musculoskeletal:         General: Normal range of motion.      Cervical back: Normal range of motion.   Neurological:      General: No focal deficit present.      Mental Status: She is alert.   Psychiatric:         Attention and Perception: She is attentive.         Mood and Affect: Mood normal.         Speech: Speech normal.         Behavior: Behavior normal. Behavior is cooperative.         Thought Content: Thought content normal.         Judgment: Judgment normal.          A/P    Diagnoses and all orders for this visit:    1. RUQ pain (Primary)  -     CT Abdomen Pelvis Without Contrast; Future    2. Bilious vomiting with nausea  -     CT Abdomen Pelvis Without Contrast; Future    3. Fever, unspecified fever cause  -     CT Abdomen Pelvis Without Contrast; Future    4. Epigastric pain  -     CT Abdomen Pelvis Without Contrast;  Future    5. Pale stool    Suspect cholecystitis with possible infection given elevated WBC, reported fever, chills, nausea and vomiting.  Will send in zofran.  CT abd/pelvis without contrast ordered STAT.  Advised patient of symptoms to watch for and when to go to ER.   is present who is a [facility] in [facility].    Plan of care reviewed with patient at the conclusion of today's visit. Education was provided regarding diagnosis, management and any prescribed or recommended OTC medications.  Patient verbalizes understanding of and agreement with management plan.    No follow-ups on file.     Mariama Jack PA-C

## 2022-02-11 ENCOUNTER — TELEPHONE (OUTPATIENT)
Dept: FAMILY MEDICINE CLINIC | Facility: CLINIC | Age: 57
End: 2022-02-11

## 2022-02-11 ENCOUNTER — HOSPITAL ENCOUNTER (OUTPATIENT)
Dept: CT IMAGING | Facility: HOSPITAL | Age: 57
Discharge: HOME | End: 2022-02-11
Admitting: PHYSICIAN ASSISTANT

## 2022-02-11 DIAGNOSIS — R10.13 EPIGASTRIC PAIN: ICD-10-CM

## 2022-02-11 DIAGNOSIS — K81.0 ACUTE CHOLECYSTITIS: Primary | ICD-10-CM

## 2022-02-11 DIAGNOSIS — R10.11 RUQ PAIN: ICD-10-CM

## 2022-02-11 DIAGNOSIS — R11.14 BILIOUS VOMITING WITH NAUSEA: ICD-10-CM

## 2022-02-11 DIAGNOSIS — R50.9 FEVER, UNSPECIFIED FEVER CAUSE: ICD-10-CM

## 2022-02-11 PROCEDURE — 74176 CT ABD & PELVIS W/O CONTRAST: CPT

## 2022-02-11 NOTE — TELEPHONE ENCOUNTER
Dr. Lele Kennedy at Radiology called to speak with Mariama Jack. He would like a call back ASAP.

## 2022-02-11 NOTE — TELEPHONE ENCOUNTER
Spoke with radiology.  Patient has acute cholecystitis with wall thickening and stranding.  No biliary obstruction.    Attempted to call patient.  No answer, voicemail full.  Attempted to call patient's .  No answer, left message.    Started referral to general surgery.  Recommend going to ER if symptoms worsen.

## 2022-03-22 DIAGNOSIS — E11.65 UNCONTROLLED TYPE 2 DIABETES MELLITUS WITH HYPERGLYCEMIA: Chronic | ICD-10-CM

## 2022-03-23 NOTE — TELEPHONE ENCOUNTER
Rx Refill Note  Requested Prescriptions     Pending Prescriptions Disp Refills   • metFORMIN (GLUCOPHAGE) 1000 MG tablet [Pharmacy Med Name: metFORMIN HCl 1000 MG Oral Tablet] 180 tablet 0     Sig: TAKE 1 TABLET BY MOUTH TWICE DAILY WITH MEALS      Last office visit with prescribing clinician: 1/26/2022      Next office visit with prescribing clinician: 7/27/2022            Sukhdev Levy MA  03/23/22, 08:21 EDT

## 2022-04-08 ENCOUNTER — TELEPHONE (OUTPATIENT)
Dept: CARDIOLOGY | Facility: CLINIC | Age: 57
End: 2022-04-08

## 2022-04-08 NOTE — TELEPHONE ENCOUNTER
Called Mrs Araya due to [facility] [place].  Left message to reboot monitor and check all connections and return my call.

## 2022-05-09 ENCOUNTER — TELEPHONE (OUTPATIENT)
Dept: FAMILY MEDICINE CLINIC | Facility: CLINIC | Age: 57
End: 2022-05-09

## 2022-05-23 ENCOUNTER — OFFICE VISIT (OUTPATIENT)
Dept: FAMILY MEDICINE CLINIC | Facility: CLINIC | Age: 57
End: 2022-05-23

## 2022-05-23 VITALS
WEIGHT: 176.4 LBS | DIASTOLIC BLOOD PRESSURE: 88 MMHG | SYSTOLIC BLOOD PRESSURE: 132 MMHG | OXYGEN SATURATION: 97 % | HEART RATE: 71 BPM | BODY MASS INDEX: 30.11 KG/M2 | HEIGHT: 64 IN

## 2022-05-23 DIAGNOSIS — E11.29 MICROALBUMINURIA DUE TO TYPE 2 DIABETES MELLITUS: Chronic | ICD-10-CM

## 2022-05-23 DIAGNOSIS — N91.2 AMENORRHEA: ICD-10-CM

## 2022-05-23 DIAGNOSIS — E11.65 UNCONTROLLED TYPE 2 DIABETES MELLITUS WITH HYPERGLYCEMIA: Primary | ICD-10-CM

## 2022-05-23 DIAGNOSIS — I63.9 ACUTE CVA (CEREBROVASCULAR ACCIDENT): ICD-10-CM

## 2022-05-23 DIAGNOSIS — R80.9 MICROALBUMINURIA DUE TO TYPE 2 DIABETES MELLITUS: Chronic | ICD-10-CM

## 2022-05-23 DIAGNOSIS — I10 ESSENTIAL HYPERTENSION: ICD-10-CM

## 2022-05-23 DIAGNOSIS — Z12.11 COLON CANCER SCREENING: ICD-10-CM

## 2022-05-23 DIAGNOSIS — E78.00 HIGH CHOLESTEROL: ICD-10-CM

## 2022-05-23 PROBLEM — R11.2 PONV (POSTOPERATIVE NAUSEA AND VOMITING): Status: ACTIVE | Noted: 2022-03-23

## 2022-05-23 PROBLEM — E11.9 DIABETES MELLITUS, TYPE 2: Status: ACTIVE | Noted: 2022-02-23

## 2022-05-23 PROBLEM — K80.00 ACUTE CALCULOUS CHOLECYSTITIS: Status: ACTIVE | Noted: 2022-02-23

## 2022-05-23 PROBLEM — Z98.890 PONV (POSTOPERATIVE NAUSEA AND VOMITING): Status: ACTIVE | Noted: 2022-03-23

## 2022-05-23 PROBLEM — I49.9 DYSRHYTHMIAS: Status: ACTIVE | Noted: 2022-03-23

## 2022-05-23 PROBLEM — K80.12 CALCULUS OF GALLBLADDER WITH ACUTE ON CHRONIC CHOLECYSTITIS WITHOUT OBSTRUCTION: Status: ACTIVE | Noted: 2022-02-23

## 2022-05-23 PROBLEM — J45.909 ASTHMA: Status: ACTIVE | Noted: 2022-03-23

## 2022-05-23 LAB
EXPIRATION DATE: ABNORMAL
HBA1C MFR BLD: 7.7 %
Lab: ABNORMAL

## 2022-05-23 PROCEDURE — 83036 HEMOGLOBIN GLYCOSYLATED A1C: CPT | Performed by: INTERNAL MEDICINE

## 2022-05-23 PROCEDURE — 99214 OFFICE O/P EST MOD 30 MIN: CPT | Performed by: INTERNAL MEDICINE

## 2022-05-23 RX ORDER — ATORVASTATIN CALCIUM 80 MG/1
80 TABLET, FILM COATED ORAL NIGHTLY
Qty: 90 TABLET | Refills: 3 | Status: SHIPPED | OUTPATIENT
Start: 2022-05-23 | End: 2022-09-29 | Stop reason: SDUPTHER

## 2022-05-23 RX ORDER — OXYCODONE HYDROCHLORIDE 5 MG/1
TABLET ORAL
COMMUNITY
Start: 2022-04-05 | End: 2022-05-23

## 2022-05-23 RX ORDER — ASPIRIN 81 MG/1
1 TABLET, CHEWABLE ORAL DAILY
COMMUNITY
Start: 2022-02-09 | End: 2022-05-23

## 2022-05-23 RX ORDER — SPIRONOLACTONE 50 MG/1
50 TABLET, FILM COATED ORAL DAILY
COMMUNITY
Start: 2022-05-02 | End: 2022-05-23

## 2022-05-23 RX ORDER — LOSARTAN POTASSIUM 100 MG/1
100 TABLET ORAL DAILY
Qty: 90 TABLET | Refills: 1 | Status: SHIPPED | OUTPATIENT
Start: 2022-05-23 | End: 2022-09-29 | Stop reason: SDUPTHER

## 2022-05-23 RX ORDER — METRONIDAZOLE 7.5 MG/G
GEL VAGINAL
COMMUNITY
Start: 2022-05-03 | End: 2022-05-23

## 2022-05-23 RX ORDER — CLOPIDOGREL BISULFATE 75 MG/1
75 TABLET ORAL DAILY
Qty: 90 TABLET | Refills: 1 | Status: SHIPPED | OUTPATIENT
Start: 2022-05-23 | End: 2022-09-29 | Stop reason: SDUPTHER

## 2022-05-23 RX ORDER — ATORVASTATIN CALCIUM 80 MG/1
1 TABLET, FILM COATED ORAL NIGHTLY
COMMUNITY
Start: 2022-02-17 | End: 2022-05-23

## 2022-05-23 RX ORDER — GLIPIZIDE 10 MG/1
TABLET, FILM COATED, EXTENDED RELEASE ORAL
Qty: 90 TABLET | Refills: 1 | Status: SHIPPED | OUTPATIENT
Start: 2022-05-23 | End: 2022-09-29 | Stop reason: SDUPTHER

## 2022-05-23 RX ORDER — VENLAFAXINE HYDROCHLORIDE 75 MG/1
225 CAPSULE, EXTENDED RELEASE ORAL DAILY
Qty: 90 CAPSULE | Refills: 1 | Status: SHIPPED | OUTPATIENT
Start: 2022-05-23 | End: 2022-09-29 | Stop reason: SDUPTHER

## 2022-05-23 RX ORDER — DILTIAZEM HYDROCHLORIDE 300 MG/1
300 CAPSULE, COATED, EXTENDED RELEASE ORAL DAILY
Qty: 90 CAPSULE | Refills: 1 | Status: SHIPPED | OUTPATIENT
Start: 2022-05-23 | End: 2022-09-29 | Stop reason: SDUPTHER

## 2022-05-23 RX ORDER — LANOLIN ALCOHOL/MO/W.PET/CERES
CREAM (GRAM) TOPICAL
COMMUNITY
End: 2022-05-23

## 2022-05-23 RX ORDER — DILTIAZEM HYDROCHLORIDE 300 MG/1
300 CAPSULE, EXTENDED RELEASE ORAL DAILY
COMMUNITY
Start: 2022-01-30 | End: 2022-05-23

## 2022-05-23 RX ORDER — TERAZOSIN 2 MG/1
2 CAPSULE ORAL NIGHTLY
Qty: 90 CAPSULE | Refills: 1 | Status: SHIPPED | OUTPATIENT
Start: 2022-05-23 | End: 2022-06-08 | Stop reason: SDUPTHER

## 2022-05-23 NOTE — PROGRESS NOTES
Rosanne Araya  1965  [id number]  Patient Care Team:  Neftali Mccain MD as PCP - General (Internal Medicine)  Gio Julian MD as Consulting Physician (Neurology)  Royal Lopez MD as Gynecologist (Gynecology)  Lauri Ventura MD as Cardiologist (Cardiology)  Omer Hernandez MD as Consulting Physician (Dermatology)  Addie Mckeon PA-C as Physician Assistant (Endocrinology)    Rosanne Araya is a 56 y.o. female here today for follow up.     This patient is accompanied by their self who contributes to the history of their care.    Chief Complaint:    Chief Complaint   Patient presents with   • Diabetes        History of Present Illness:  I have reviewed and/or updated the patient's past medical, past surgical, family, social history, problem list and allergies as appropriate.     Has been eating more freely since her GB removal in march. Continues to use metformin, holding glipizide. Continues on jardiance. Denies hypoglycemia. Needs to see optho and has followed up for colonoscopy. Denies hypoglycemia.   Over all feels well.  She is due for for fasting cholesertol. Conitues to be monitored with loop recorder. Denies myalgias or dark colored urine.  Denies any chest pain shortness of breath orthopnea or PND.  She is due for shingles as well as pneumonia vaccine.  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  We will check on this.    Review of Systems   Constitutional: Negative for chills, fatigue, fever, unexpected weight gain and unexpected weight loss.   HENT: Negative for ear pain, postnasal drip, sinus pressure and sore throat.    Eyes: Negative for blurred vision, double vision and visual disturbance.   Respiratory: Negative for cough, shortness of breath and wheezing.    Cardiovascular: Negative for chest pain, palpitations and leg swelling.   Gastrointestinal: Negative for abdominal pain, blood in stool, diarrhea, nausea and vomiting.   Endocrine: Negative  "for cold intolerance, heat intolerance, polydipsia, polyphagia and polyuria.   Genitourinary: Negative for dysuria, flank pain and hematuria.   Musculoskeletal: Negative for arthralgias and joint swelling.   Skin: Negative for dry skin and rash.   Neurological: Negative for weakness, numbness and headache.   Psychiatric/Behavioral: Negative for self-injury, suicidal ideas and depressed mood.       Vitals:    05/23/22 0852   BP: 132/88   Pulse: 71   SpO2: 97%   Weight: 80 kg (176 lb 6.4 oz)   Height: 162.6 cm (64.02\")     Body mass index is 30.26 kg/m².    Physical Exam  Vitals and nursing note reviewed.   Constitutional:       General: She is not in acute distress.     Appearance: She is well-developed. She is not diaphoretic.   HENT:      Head: Normocephalic and atraumatic.      Right Ear: External ear normal.      Left Ear: External ear normal.      Mouth/Throat:      Pharynx: No oropharyngeal exudate.   Eyes:      General: No scleral icterus.        Right eye: No discharge.      Conjunctiva/sclera: Conjunctivae normal.   Neck:      Thyroid: No thyromegaly.      Vascular: No JVD.      Trachea: No tracheal deviation.   Cardiovascular:      Rate and Rhythm: Normal rate and regular rhythm.      Heart sounds: Normal heart sounds.      Comments: PMI nondisplaced  Pulmonary:      Effort: Pulmonary effort is normal.      Breath sounds: Normal breath sounds. No wheezing or rales.   Abdominal:      General: Bowel sounds are normal.      Palpations: Abdomen is soft.      Tenderness: There is no abdominal tenderness. There is no guarding or rebound.   Musculoskeletal:      Cervical back: Normal range of motion and neck supple.      Comments: Normal gait   Lymphadenopathy:      Cervical: No cervical adenopathy.   Skin:     General: Skin is warm and dry.      Capillary Refill: Capillary refill takes less than 2 seconds.      Coloration: Skin is not pale.      Findings: No rash.   Neurological:      Mental Status: She is alert " and oriented to person, place, and time.      Motor: No abnormal muscle tone.      Coordination: Coordination normal.   Psychiatric:         Judgment: Judgment normal.         Procedures    Results Review:    I reviewed the patient's new clinical results.  A1c is increased from 6.7-7.7    Assessment/Plan:    Problem List Items Addressed This Visit        Cardiac and Vasculature    Essential hypertension    Relevant Medications    carvedilol (COREG) 6.25 MG tablet    losartan (COZAAR) 100 MG tablet    dilTIAZem CD (Cartia XT) 300 MG 24 hr capsule    terazosin (HYTRIN) 2 MG capsule    Other Relevant Orders    CBC & Differential    Comprehensive Metabolic Panel    High cholesterol    Relevant Medications    atorvastatin (LIPITOR) 80 MG tablet       Endocrine and Metabolic    Uncontrolled type 2 diabetes mellitus with hyperglycemia (HCC) - Primary (Chronic)    Current Assessment & Plan     Diabetes is worsening.   Reminded to bring in blood sugar diary at next visit.  Regular aerobic exercise.  Reminded to get yearly retinal exam.  Medication changes per orders.  resume glipizide and efforts at diet  Diabetes will be reassessed in 3 months.           Relevant Medications    Lancets misc    glucose monitor monitoring kit    glucose blood test strip    empagliflozin (Jardiance) 10 MG tablet tablet    glipizide (GLUCOTROL XL) 10 MG 24 hr tablet    metFORMIN (GLUCOPHAGE) 1000 MG tablet    Other Relevant Orders    POC Glycosylated Hemoglobin (Hb A1C) (Completed)    Ambulatory Referral for Diabetic Eye Exam-Optometry    Microalbuminuria due to type 2 diabetes mellitus (HCC) (Chronic)    Relevant Medications    empagliflozin (Jardiance) 10 MG tablet tablet    glipizide (GLUCOTROL XL) 10 MG 24 hr tablet    metFORMIN (GLUCOPHAGE) 1000 MG tablet       Neuro    Acute CVA (cerebrovascular accident) (HCC)    Overview     · CT head 11/26/2020: No acute intracranial abnormality, age-related changes of the brain including likely  chronic right frontal lobe lacunar infarct   · CT cerebral perfusion 11/26/2020: Findings consistent with focal right PCA territory ischemia and larger area of slow flow.  No apparent core infarct.  · CTA head/neck (11/26/2020): Truncated appearance of the proximal right posterior cerebral artery consistent with patient's perfusion scan abnormality, potentially 50% or greater distal left cavernous carotid artery stenosis  · Echocardiogram (11/27/2020): LVEF 65%, LV wall thickness consistent with concentric hypertrophy, the cardiac valves are anatomically and functionally normal, saline test results negative  · Transcranial Doppler (11/27/2020): Normal mean flow velocities of bilateral MCA, terminal ICA.  No Doppler evidence suggestive of PFO  · Mobile cardiac outpatient telemetry monitor (12/14/2020): Normal monitor study  · Negative hypercoagulable work-up  · MRI brain 11/26/2020: Scattered small old vessel ischemic changes, no evidence of recent ischemia  · Mild residual left-sided weakness  · [facility] 3/11/2021 overnight admission for TIA in the setting of KENYA, dehydration  · CT head (3/11/2021):  Stable appearance of the brain with no evidence of acute intracranial abnormality  · Transcranial Doppler (3/11/2021): Normal mean flow velocities of bilateral MCA, terminal ICA.  No Doppler evidence suggestive of PFO  · MRI brain (3/11/2021): No acute infarction, mild to moderate chronic small vessel ischemic disease findings somewhat advanced for patient age, however, no focal or generalized atrophy  · KHURRAM (4/19/2021):   · Loop recorder implant ([facility]), 4/19/2021           Relevant Medications    aspirin 81 MG chewable tablet    atorvastatin (LIPITOR) 80 MG tablet    clopidogrel (PLAVIX) 75 MG tablet      Other Visit Diagnoses     Colon cancer screening        Relevant Orders    Ambulatory Referral For Screening Colonoscopy    Amenorrhea        Relevant Medications    venlafaxine XR (EFFEXOR-XR) 75 MG 24 hr capsule    Other  Relevant Orders    Ambulatory Referral to Gynecology          Plan of care reviewed with patient at the conclusion of today's visit. Education was provided regarding diagnosis and management.  Patient verbalizes understanding of and agreement with management plan.    Return in about 3 months (around 8/23/2022) for diabetes , Next scheduled follow up.    Neftali Mccain MD      Please note than portions of this note were completed wt a Voice Recognition Program

## 2022-05-23 NOTE — ASSESSMENT & PLAN NOTE
Diabetes is worsening.   Reminded to bring in blood sugar diary at next visit.  Regular aerobic exercise.  Reminded to get yearly retinal exam.  Medication changes per orders.  resume glipizide and efforts at diet  Diabetes will be reassessed in 3 months.

## 2022-05-31 ENCOUNTER — TELEPHONE (OUTPATIENT)
Dept: GASTROENTEROLOGY | Facility: CLINIC | Age: 57
End: 2022-05-31

## 2022-05-31 NOTE — TELEPHONE ENCOUNTER
May 31, 2022 [address] [place] [address]  Phone: [phone]  Fax: [phone]         Rosanne Araya  [address] [id number]  1965        Patient will be having a COLONOSCOPY with Dr Brendon Cormier on 6/30/2022. The patient is needing cardiac clearance due to being on blood thinners. Please complete the following and fax back to the office.     Patient's was last seen in the office on:_____________________    Procedure/Test Performed:    _____ Stress Test       _____ Echocardiogram    _____ EKG     _____ Heart Cath    Based on the above test results and/or clinical evaluation it is my recommendation:    ____ Patient may proceed with the scheduled surgical procedure with an acceptable cardiac risk.    ____ Patient CAN NOT stop Plavix, Effient, Ticlid, Aspirin, Coumadin, Pradaxa, Xarelto, Eliquis, Savaysa, or Brilinta prior to procedure.     ____ Patient CAN stop Plavix, Effient, Ticlid, Aspirin, Coumadin, Pradaxa, Xarelto, Eliquis, Savaysa, or Brilinta  ______ days prior to procedure.    Please sign and date below.    Signature________________________________________   Date:_________________     Thank You      Brendon Cormier M.D.

## 2022-05-31 NOTE — TELEPHONE ENCOUNTER
I spoke with Ms Araya. Patient agreed to call Dr Ventura's office to schedule follow up to get surgical clearance for Colonoscopy with Dr Cormier.

## 2022-05-31 NOTE — TELEPHONE ENCOUNTER
This is a Dr. Ventura patient but has not been seen since 2/19/2021. You can check with his nurse but will probably need to be seen prior.

## 2022-06-03 RX ORDER — CARVEDILOL 6.25 MG/1
TABLET ORAL
Qty: 90 TABLET | Refills: 0 | Status: SHIPPED | OUTPATIENT
Start: 2022-06-03 | End: 2022-07-14

## 2022-06-08 RX ORDER — TERAZOSIN 2 MG/1
2 CAPSULE ORAL NIGHTLY
Qty: 90 CAPSULE | Refills: 1 | Status: SHIPPED | OUTPATIENT
Start: 2022-06-08 | End: 2022-07-27 | Stop reason: DRUGHIGH

## 2022-06-08 NOTE — TELEPHONE ENCOUNTER
Rx Refill Note  Requested Prescriptions     Pending Prescriptions Disp Refills   • terazosin (HYTRIN) 2 MG capsule 90 capsule 1     Sig: Take 1 capsule by mouth Every Night.      Last office visit with prescribing clinician: 5/23/2022      Next office visit with prescribing clinician: 7/27/2022            Ana Ling MA  06/08/22, 16:33 EDT

## 2022-06-08 NOTE — TELEPHONE ENCOUNTER
Caller: JOANNA ROSAS    Relationship: SELF    Best call back number: [phone]    Requested Prescriptions:   Requested Prescriptions     Pending Prescriptions Disp Refills   • terazosin (HYTRIN) 2 MG capsule 90 capsule 1     Sig: Take 1 capsule by mouth Every Night.        Pharmacy where request should be sent: [facility] [address] [place] [address] [phone] [facility] [phone] FX     Additional details provided by patient: PATIENT WOULD LIKE MEDICATION CHANGED TO BID.      Does the patient have less than a 3 day supply:  [x] Yes  [] No    Priya Smart   06/08/22 16:31 EDT

## 2022-07-14 RX ORDER — CARVEDILOL 6.25 MG/1
TABLET ORAL
Qty: 90 TABLET | Refills: 0 | Status: SHIPPED | OUTPATIENT
Start: 2022-07-14 | End: 2022-07-27 | Stop reason: DRUGHIGH

## 2022-07-14 NOTE — TELEPHONE ENCOUNTER
Rx Refill Note  Requested Prescriptions     Pending Prescriptions Disp Refills   • carvedilol (COREG) 6.25 MG tablet [Pharmacy Med Name: Carvedilol 6.25 MG Oral Tablet] 90 tablet 0     Sig: TAKE 1 TABLET BY MOUTH TWICE DAILY WITH MEALS      Last office visit with prescribing clinician: 5/23/2022      Next office visit with prescribing clinician: 7/27/2022            Ana Ling MA  07/14/22, 08:04 EDT

## 2022-07-22 NOTE — PROGRESS NOTES
Subjective:     Encounter Date:07/27/2022    Patient ID: Rosanne Araya is a 56 y.o. white  female, unemployed, from [place], [place].    REFERRING PROVIDER: CHANDNI Mckinney  INTERNIST: Neftali Mccain MD  NEUROLOGIST: Jesús Costello MD  ENDOCRINOLOGY PROVIDER: Addie Mckeon PA-C  INTERVENTIONAL CARDIOLOGIST: Adonis Eng IV, MD, [facility], [facility]  FORMER GYNECOLOGIST: Royal Lopez MD  GASTROENTEROLOGIST: Brendon Cormier MD    Chief Complaint:   Chief Complaint   Patient presents with   • Cerebrovascular Accident       Problem List:  1. Right posterior cerebral artery CVA probable combined severe hypertension and uncontrolled diabetes with dyslipidemia as etiology.  a. CT head 11/26/2020: No acute intracranial abnormality, age-related changes of the brain including likely chronic right frontal lobe lacunar infarct   b. CT cerebral perfusion 11/26/2020: Findings consistent with focal right PCA territory ischemia and larger area of slow flow.  No apparent core infarct.  c. CTA head/neck 11/26/2020: Truncated appearance of the proximal right posterior cerebral artery consistent with patient's perfusion scan abnormality, potentially 50% or greater distal left cavernous carotid artery stenosis  d. Echocardiogram 11/27/2020: LVEF 65%, LV wall thickness consistent with concentric hypertrophy, the cardiac valves are anatomically and functionally normal, saline test results negative  e. Transcranial Doppler 11/27/2020: Normal mean flow velocities of bilateral MCA, terminal ICA.  No Doppler evidence suggestive of PFO  f. Mobile cardiac outpatient telemetry monitor 12/14/2020: Normal monitor study  g. Negative hypercoagulable work-up  h. MRI brain 11/26/2020: Scattered small old vessel ischemic changes, no evidence of recent ischemia  i. Mild residual left-sided weakness, July 2022  2. Uncontrolled hypertension with negative renal artery duplex November 2020 for renal artery stenosis  with continued labile readings.  3. Hyperlipidemia; on statin therapy  4. Type 2 diabetes mellitus; hemoglobin A1c 8.4% 2020, 7.7% May 2022  5. Depression  6. Mild obesity: BMI 30.79  7. Herpes labialis  8. Remote weekly marijuana use  9. Surgical history:  a. Tonsils and adenoids  b.    c. Laminectomy x2    No Known Allergies    Current Outpatient Medications:   •  aspirin 81 MG chewable tablet, Chew 1 tablet Daily., Disp: 90 tablet, Rfl: 1  •  atorvastatin (LIPITOR) 80 MG tablet, Take 1 tablet by mouth Every Night., Disp: 90 tablet, Rfl: 3  •  carvedilol (COREG) 6.25 MG tablet, TAKE 1 TABLET BY MOUTH TWICE DAILY WITH MEALS, Disp: 90 tablet, Rfl: 0  •  clopidogrel (PLAVIX) 75 MG tablet, Take 1 tablet by mouth Daily., Disp: 90 tablet, Rfl: 1  •  dilTIAZem CD (Cartia XT) 300 MG 24 hr capsule, Take 1 capsule by mouth Daily., Disp: 90 capsule, Rfl: 1  •  empagliflozin (Jardiance) 10 MG tablet tablet, Take 1 tablet by mouth Daily., Disp: 90 tablet, Rfl: 1  •  glipizide (GLUCOTROL XL) 10 MG 24 hr tablet, Take 10 mg daily in the morning, Disp: 90 tablet, Rfl: 1  •  glucose blood test strip, Check BS BID, Disp: 100 each, Rfl: 1  •  glucose monitor monitoring kit, Use BID to check BS, Disp: 1 each, Rfl: 1  •  Lancets misc, Check BS BID, Disp: 100 each, Rfl: 1  •  losartan (COZAAR) 100 MG tablet, Take 1 tablet by mouth Daily., Disp: 90 tablet, Rfl: 1  •  metFORMIN (GLUCOPHAGE) 1000 MG tablet, Take 1 tablet by mouth 2 (Two) Times a Day With Meals., Disp: 180 tablet, Rfl: 0  •  terazosin (HYTRIN) 2 MG capsule, Take 1 capsule by mouth Every Night., Disp: 90 capsule, Rfl: 1  •  venlafaxine XR (EFFEXOR-XR) 75 MG 24 hr capsule, Take 3 capsules by mouth Daily., Disp: 90 capsule, Rfl: 1    History of Present Illness: Patient presents for cardiac clearance for a colonoscopy after a 17-month hiatus. She was a no show for her 2021 appointment with us, and her 2021 appointment was cancelled via  interface. Since her 19 February 2021 consult, she had a 20 hour [facility] admission March 2021 for transient ischemic attack. Her urine screening had no growth, but had moderate blood presence (2+), and trace leukocytes. Her COVID-19 screening was negative. She underwent imaging as outlined below. At discharge, her carvedilol was altered to 12.5 mg PO BID with meals, and her spironolactone was discontinued. She had a 3 hour [facility] admission 19 April 2021 for loop insertion with Dr. Eng. She also underwent KHURRAM at that time; see below. No medication changes were made. She presented to [facility] ED on 09 February 2022 for abdominal pain-data deficit. Patient reports that her terazosin helps with her blood pressure, but also causes her to feel fatigue, so she has been only taking it at night. She has been doing well overall from a cardiovascular standpoint. She had been walking her dog, named Veronika Lane, each day at a two mile loop near her house until the dog passed away approximately one month ago. She has not been walking as much since then. She states that she has been experiencing some mild depression since her dog passed away. Patient denies chest pain, shortness of breath, orthopnea, palpitations, edema, dizziness, and syncope.  She has had no additional interim ER visits, hospitalizations, serious illnesses, or surgeries.     ROS   Obtained and negative except as outlined in problem list and HPI.      ECG 12 Lead    Date/Time: 7/27/2022 9:37 AM  Performed by: Lauri Ventura MD  Authorized by: Lauri Ventura MD   Comparison: compared with previous ECG from 2/9/2022  Similar to previous ECG  Rhythm: sinus rhythm  BPM: 77  Other findings: T wave abnormality    Clinical impression: abnormal EKG  Comments: Rightward axis; Consider inferior ischemia  QRS 94 ms  QTc 448 ms   ms               Objective:       Vitals:    07/27/22 0920 07/27/22 0921 07/27/22 0932   BP: 180/60 170/98 162/90   BP Location: Right arm Right  "arm Right arm   Patient Position: Sitting Standing Sitting   Cuff Size: Adult Adult Adult   Pulse: 77 79    SpO2: 99% 99%    Weight: 81.4 kg (179 lb 6.4 oz) 81.4 kg (179 lb 6.4 oz)    Height: 162.6 cm (64\") 162.6 cm (64\")      Body mass index is 30.79 kg/m².  Last weight: 177 lbs at 19 February 2021 consult    Vitals reviewed.   Constitutional:       Appearance: Well-developed.   Neck:      Thyroid: No thyromegaly.      Vascular: No carotid bruit or JVD.      Lymphadenopathy: No cervical adenopathy.   Pulmonary:      Effort: Pulmonary effort is normal.      Breath sounds: Normal breath sounds. No wheezing. No rhonchi. No rales.   Cardiovascular:      Regular rhythm.      Murmurs: There is a grade 2/6 mid frequency early systolic murmur at the LLSB.      No gallop. No S3 gallop.   Pulses:     Dorsalis pedis: 1+ bilaterally.     Posterior tibial: 1+ bilaterally.  Abdominal:      Palpations: Abdomen is soft. There is no abdominal mass.      Tenderness: There is no abdominal tenderness.   Musculoskeletal: Normal range of motion. Skin:     General: Skin is warm and dry.      Findings: No rash.   Neurological:      Mental Status: Alert and oriented to person, place, and time.           Lab Review:   Lab Results   Component Value Date    GLUCOSE 195 (H) 02/09/2022    BUN 16 03/23/2022    CREATININE 0.77 03/23/2022    EGFRIFNONA >60 03/23/2022    EGFRIFAFRI >60 03/23/2022    BCR 21 03/23/2022     03/23/2022    K 4.5 03/23/2022     03/23/2022    CO2 27 03/23/2022    CALCIUM 9.5 03/23/2022    ALBUMIN 4.20 02/09/2022    AST 15 02/09/2022    ALT 7 02/09/2022       Lab Results   Component Value Date    WBC 14.66 (H) 02/09/2022    HGB 14.2 02/09/2022    HCT 41.8 02/09/2022    MCV 87.8 02/09/2022     02/09/2022       Lab Results   Component Value Date    HGBA1C 7.7 05/23/2022       Lab Results   Component Value Date    TSH 0.942 08/12/2021       Lab Results   Component Value Date    CHOL 109 04/19/2021    CHOL " 120 03/12/2021    CHOL 112 01/19/2021     Lab Results   Component Value Date    TRIG 75 04/19/2021    TRIG 72 03/12/2021    TRIG 142 01/19/2021     Lab Results   Component Value Date    HDL 43 04/19/2021    HDL 37 (L) 03/12/2021    HDL 31 (L) 01/19/2021     Lab Results   Component Value Date    LDL 51 04/19/2021    LDL 68 03/12/2021    LDL 56 01/19/2021     · CT head without contrast, stroke protocol, 11 March 2021  FINDINGS: Gray-white differentiation is maintained, with redemonstrated chronic appearing right frontal centrum semiovale lacunar infarct. There is otherwise no evidence of acute ischemia, hemorrhage, mass or mass effect. The ventricles are normal in size and configuration. The orbits are normal and the paranasal sinuses are grossly clear.    IMPRESSION: Stable appearance of the brain with no evidence of acute intracranial abnormality.    · XR Chest, 11 March 2021  FINDINGS: Cardiac size within normal limits. Increased perihilar lung markings right greater than left consistent with likely central vascular congestion versus minimal peribronchial inflammatory findings. No pneumothorax or pleural effusion. Degenerative changes of the spine.      IMPRESSION: Mild prominence of the perihilar lung markings likely central vascular congestion without overt edema or effusion.    · MRI brain without contrast, 11 March 2021  FINDINGS: No restriction on diffusion-weighted sequences. Midline structures are symmetric without evidence of mass, mass effect or midline shift. Ventricles and sulci within normal limits. Mild to moderate increased signal findings in the periventricular deep white matter suggesting likely small vessel ischemic disease of a somewhat advanced for patient age, however, no susceptibility artifact at these areas or elsewhere apart from mild chronic changes. Globes and orbits retain normal T2 signal characteristics. Paranasal sinuses and mastoid air cells demonstrate mild mucosal thickening of the  maxillary sinuses and ethmoid air cells along with a trace right mastoid effusion. No cerebellopontine angle mass lesion. Normal signal flow voids of the distal internal carotid and vertebrobasilar arteries. Pituitary and sella within normal limits. Cervicomedullary junction widely patent.   IMPRESSION:  1. No acute infarction.   2. Mild to moderate chronic small vessel ischemic disease findings somewhat advanced for patient age, however, no focal or generalized atrophy.    · EP/CRM study, 19 April 2021  · Successful loop recorder (BSC) implantation for cryptogenic CVA    · Bilateral screening mammogram with CAD, 02 February 2022  FINDINGS:  There are scattered areas of fibroglandular density.    The fibroglandular pattern is stable.  There are no suspicious masses, worrisome calcifications, nonsurgical areas of architectural distortion, or other secondary signs of malignancy. There is a loop recorder that projects over the left medial breast.   IMPRESSION: No mammographic findings suspicious for malignancy.    · Mammogram outside films, 02 February 2022- data deficit    · Mammogram outside films, 02 February 2022- data deficit    · Mammogram outside films, 04 February 2022- data deficit    · CT abdomen pelvis without contrast, 11 February 2022  FINDINGS:  There is atelectasis within both lung bases.  The liver is within normal limits.  Gallbladder contains multiple gallstones.  There is gallbladder wall thickening and mild pericholecystic fat stranding.  Findings are compatible with acute cholecystitis.  There is no evidence of biliary tract obstruction.  Pancreas appears within normal limits.  The spleen is homogeneous and within normal limits of size.  The adrenal glands are within normal limits bilaterally  There are low density masses of the left kidney compatible cysts. Kidneys are otherwise unremarkable.  There is no abdominal or retroperitoneal adenopathy.  The upper GI tract is within normal  limits.  Pelvis:  Urinary bladder is within normal limits.  There are scattered colonic diverticula.  GI tract is otherwise unremarkable.  The appendix is within normal limits.  IUD is noted be in place within the uterus.  Uterus and ovaries are  otherwise unremarkable.  Normal-sized follicles of the right ovary.   There is a 3.4 cm cyst within the left ovary, likely functional given the patient's age.  Consider follow-up pelvic ultrasound in 6 weeks to evaluate for resolution.  No pelvic or inguinal adenopathy is seen.   There is no free intraperitoneal fluid.  There are bilateral L3 pars defects with 8 mm of anterolisthesis of L3 on L4.  No suspicious lytic or sclerotic bony lesion identified.  IMPRESSION:  1.  Thick-walled gallbladder.  There are multiple stones within the gallbladder.  Findings would be consistent with acute cholecystitis.  No evidence of biliary tract obstruction.  Finds were personally called to Mildred Jack PA-C at 12:06 PM  on 2/11/2022  2.  Left ovarian cyst measuring 3.47 m in size.  This is likely functional given the patient's age.  Follow-up pelvic ultrasound could be performed in 6 weeks to evaluate for resolution.    · KHURRAM, 19 April 2021   · No cardiac source of emboli identified.  · No PFO identified.  · No atherosclerosis of the ascending or transverse aorta noted  · Saline test results are negative for right to left atrial level shunt.    · MURJ, 28 June 2022  · [employer] ILR M301  · This is a normal remote diagnostic device check  · Alerts or events: None  · Battery data was reviewed  · Battery status: PAPO,  · Presenting rhythm reviewed  · Heart Rate Histograms reviewed  · Implant date 19 April 2021          Assessment:       Overall continued acceptable course with no new interim cardiopulmonary complaints with acceptable functional status, except for uncontrolled blood pressure readings. We will defer additional diagnostic or therapeutic intervention from a  cardiac perspective at this time, other than the medication changes outlined below. If her blood pressure control improves, she can undergo colonoscopy, with apixaban held three days prior to the procedure.     Diagnosis Plan   1. Acute CVA (cerebrovascular accident) (HCC)  No neurologic deficits.   2. Essential hypertension  Suboptimal control in office. Continue current treatment, including heart healthy diet, exercise, and cardiac medications.   3. Dyslipidemia  Excellent control. Continue current treatment, including heart healthy diet, exercise, and cardiac medications.   4. Uncontrolled type 2 diabetes mellitus with hyperglycemia ([facility])  HgbA1c 7.7% May 2022. Continue current treatment, including heart healthy diet, exercise, and cardiac medications.          Plan:         1. Patient to continue current medications and close follow up with the above providers.  A. Increase carvedilol to 12.5 mg BID  B. Increase terazosin to 5 mg HS daily  C. She will update us in the next 2-3 weeks concerning blood pressure readings.  2. Patient is encouraged to implement a regular aerobic exercise routine, at least 30 minutes daily, 4-5 days per week.  3. Tentative cardiology follow up in December 2022 or patient may return sooner PRN.    Scribed for Lauri Ventura MD by Stephania Corrales. 7/27/2022  10:14 EDT    I, Lauri Ventura MD, [facility], personally performed the services described in this documentation as scribed by the above named individual in my presence, and it is both accurate and complete. At 10:18 EDT on 07/27/2022

## 2022-07-27 ENCOUNTER — OFFICE VISIT (OUTPATIENT)
Dept: CARDIOLOGY | Facility: CLINIC | Age: 57
End: 2022-07-27

## 2022-07-27 VITALS
OXYGEN SATURATION: 99 % | BODY MASS INDEX: 30.63 KG/M2 | WEIGHT: 179.4 LBS | HEART RATE: 79 BPM | DIASTOLIC BLOOD PRESSURE: 90 MMHG | SYSTOLIC BLOOD PRESSURE: 162 MMHG | HEIGHT: 64 IN

## 2022-07-27 DIAGNOSIS — E78.5 DYSLIPIDEMIA: ICD-10-CM

## 2022-07-27 DIAGNOSIS — I63.9 ACUTE CVA (CEREBROVASCULAR ACCIDENT): ICD-10-CM

## 2022-07-27 DIAGNOSIS — E11.65 UNCONTROLLED TYPE 2 DIABETES MELLITUS WITH HYPERGLYCEMIA: ICD-10-CM

## 2022-07-27 DIAGNOSIS — I10 ESSENTIAL HYPERTENSION: Primary | ICD-10-CM

## 2022-07-27 PROCEDURE — 93000 ELECTROCARDIOGRAM COMPLETE: CPT | Performed by: INTERNAL MEDICINE

## 2022-07-27 PROCEDURE — 99214 OFFICE O/P EST MOD 30 MIN: CPT | Performed by: INTERNAL MEDICINE

## 2022-07-27 RX ORDER — CARVEDILOL 12.5 MG/1
12.5 TABLET ORAL 2 TIMES DAILY
Qty: 180 TABLET | Refills: 3 | Status: SHIPPED | OUTPATIENT
Start: 2022-07-27 | End: 2022-09-29 | Stop reason: SDUPTHER

## 2022-07-27 RX ORDER — TERAZOSIN 5 MG/1
5 CAPSULE ORAL NIGHTLY
Qty: 90 CAPSULE | Refills: 1 | Status: SHIPPED | OUTPATIENT
Start: 2022-07-27 | End: 2022-09-29 | Stop reason: SDUPTHER

## 2022-08-26 DIAGNOSIS — Z12.11 COLON CANCER SCREENING: Primary | ICD-10-CM

## 2022-08-26 RX ORDER — SODIUM, POTASSIUM,MAG SULFATES 17.5-3.13G
1 SOLUTION, RECONSTITUTED, ORAL ORAL TAKE AS DIRECTED
Qty: 354 ML | Refills: 0 | Status: SHIPPED | OUTPATIENT
Start: 2022-08-26

## 2022-09-20 ENCOUNTER — TELEPHONE (OUTPATIENT)
Dept: CARDIOLOGY | Facility: CLINIC | Age: 57
End: 2022-09-20

## 2022-09-20 NOTE — TELEPHONE ENCOUNTER
Per [facility] message, pt's home monitor hasn't communicated with web site since 7/21/2022. Attempted to call pt, no answer & her voice mailbox was full.     Left message on pt's spouse's phone to reset home monitor and/or call device clinic for assistance-contact # provided.

## 2022-09-29 ENCOUNTER — OFFICE VISIT (OUTPATIENT)
Dept: FAMILY MEDICINE CLINIC | Facility: CLINIC | Age: 57
End: 2022-09-29

## 2022-09-29 VITALS
DIASTOLIC BLOOD PRESSURE: 80 MMHG | SYSTOLIC BLOOD PRESSURE: 128 MMHG | BODY MASS INDEX: 30.9 KG/M2 | HEART RATE: 67 BPM | OXYGEN SATURATION: 98 % | TEMPERATURE: 98.4 F | WEIGHT: 180 LBS

## 2022-09-29 DIAGNOSIS — I63.9 ACUTE CVA (CEREBROVASCULAR ACCIDENT): ICD-10-CM

## 2022-09-29 DIAGNOSIS — E11.29 MICROALBUMINURIA DUE TO TYPE 2 DIABETES MELLITUS: Chronic | ICD-10-CM

## 2022-09-29 DIAGNOSIS — E78.5 DYSLIPIDEMIA: ICD-10-CM

## 2022-09-29 DIAGNOSIS — I10 ESSENTIAL HYPERTENSION: Primary | ICD-10-CM

## 2022-09-29 DIAGNOSIS — R80.9 MICROALBUMINURIA DUE TO TYPE 2 DIABETES MELLITUS: Chronic | ICD-10-CM

## 2022-09-29 DIAGNOSIS — Z23 NEED FOR VACCINATION: ICD-10-CM

## 2022-09-29 DIAGNOSIS — E11.65 UNCONTROLLED TYPE 2 DIABETES MELLITUS WITH HYPERGLYCEMIA: Chronic | ICD-10-CM

## 2022-09-29 DIAGNOSIS — F33.1 MODERATE EPISODE OF RECURRENT MAJOR DEPRESSIVE DISORDER: ICD-10-CM

## 2022-09-29 DIAGNOSIS — N91.2 AMENORRHEA: ICD-10-CM

## 2022-09-29 PROBLEM — Z98.890 PONV (POSTOPERATIVE NAUSEA AND VOMITING): Status: RESOLVED | Noted: 2022-03-23 | Resolved: 2022-09-29

## 2022-09-29 PROBLEM — R11.2 PONV (POSTOPERATIVE NAUSEA AND VOMITING): Status: RESOLVED | Noted: 2022-03-23 | Resolved: 2022-09-29

## 2022-09-29 PROCEDURE — 0124A COVID-19 (PFIZER) BIVALENT BOOSTER 12+YRS: CPT | Performed by: INTERNAL MEDICINE

## 2022-09-29 PROCEDURE — 90686 IIV4 VACC NO PRSV 0.5 ML IM: CPT | Performed by: INTERNAL MEDICINE

## 2022-09-29 PROCEDURE — 90471 IMMUNIZATION ADMIN: CPT | Performed by: INTERNAL MEDICINE

## 2022-09-29 PROCEDURE — 91312 COVID-19 (PFIZER) BIVALENT BOOSTER 12+YRS: CPT | Performed by: INTERNAL MEDICINE

## 2022-09-29 PROCEDURE — 99214 OFFICE O/P EST MOD 30 MIN: CPT | Performed by: INTERNAL MEDICINE

## 2022-09-29 RX ORDER — CLOPIDOGREL BISULFATE 75 MG/1
75 TABLET ORAL DAILY
Qty: 90 TABLET | Refills: 1 | Status: SHIPPED | OUTPATIENT
Start: 2022-09-29

## 2022-09-29 RX ORDER — ASPIRIN 81 MG/1
81 TABLET, CHEWABLE ORAL DAILY
Qty: 90 TABLET | Refills: 1 | Status: SHIPPED | OUTPATIENT
Start: 2022-09-29

## 2022-09-29 RX ORDER — VENLAFAXINE HYDROCHLORIDE 75 MG/1
225 CAPSULE, EXTENDED RELEASE ORAL DAILY
Qty: 90 CAPSULE | Refills: 1 | Status: SHIPPED | OUTPATIENT
Start: 2022-09-29

## 2022-09-29 RX ORDER — TERAZOSIN 5 MG/1
5 CAPSULE ORAL NIGHTLY
Qty: 90 CAPSULE | Refills: 1 | Status: SHIPPED | OUTPATIENT
Start: 2022-09-29 | End: 2022-10-12 | Stop reason: SDUPTHER

## 2022-09-29 RX ORDER — DILTIAZEM HYDROCHLORIDE 300 MG/1
300 CAPSULE, COATED, EXTENDED RELEASE ORAL DAILY
Qty: 90 CAPSULE | Refills: 1 | Status: SHIPPED | OUTPATIENT
Start: 2022-09-29

## 2022-09-29 RX ORDER — ATORVASTATIN CALCIUM 80 MG/1
80 TABLET, FILM COATED ORAL NIGHTLY
Qty: 90 TABLET | Refills: 3 | Status: SHIPPED | OUTPATIENT
Start: 2022-09-29 | End: 2023-09-29

## 2022-09-29 RX ORDER — LOSARTAN POTASSIUM 100 MG/1
100 TABLET ORAL DAILY
Qty: 90 TABLET | Refills: 1 | Status: SHIPPED | OUTPATIENT
Start: 2022-09-29

## 2022-09-29 RX ORDER — CARVEDILOL 12.5 MG/1
12.5 TABLET ORAL 2 TIMES DAILY
Qty: 180 TABLET | Refills: 3 | Status: SHIPPED | OUTPATIENT
Start: 2022-09-29

## 2022-09-29 RX ORDER — GLIPIZIDE 10 MG/1
TABLET, FILM COATED, EXTENDED RELEASE ORAL
Qty: 90 TABLET | Refills: 1 | Status: SHIPPED | OUTPATIENT
Start: 2022-09-29

## 2022-09-29 NOTE — PROGRESS NOTES
Rosanne Araya  1965  [id number]  Patient Care Team:  Neftali Mccain MD as PCP - General (Internal Medicine)  Gio Julian MD as Consulting Physician (Neurology)  Royal Lopez MD as Gynecologist (Gynecology)  Lauri Ventura MD as Cardiologist (Cardiology)  Omer Hernandez MD as Consulting Physician (Dermatology)  Addie Mckeon PA-C as Physician Assistant (Endocrinology)    Rosanne Araya is a 56 y.o. female here today for follow up.     This patient is accompanied by their self who contributes to the history of their care.    Chief Complaint:    Chief Complaint   Patient presents with   • Follow-up   • Med Refill         History of Present Illness:  I have reviewed and/or updated the patient's past medical, past surgical, family, social history, problem list and allergies as appropriate.     She is here to follow up. Has not seen endo. Not checking her sugars but every 2 or three days. Denies any pu/pd. She has seen opthalmology. Continues on glipizide, metformin and glucotrol. She had one hypoglycemia 2/2 to missing a meal.    Still needs colonoscopy. Bps under better control after Dr. Ventura adjusted her meds.  She continues to tolerate Lipitor 80 mg daily without any muscle aches.  Continues to to take Plavix as well as aspirin can Jonathan stroke prevention.    She is having a lot of depression with marital issues, loss or 2 dogs. She is not wanting to increase effexor.  Denies any homicidal suicidal ideations.  She is spoken with a counselor in the past is willing to.  She is wondering whether they would need marital counseling as well.  A lot of issues with her 13-year-old son    Review of Systems   Constitutional: Positive for unexpected weight gain.   HENT: Negative.    Cardiovascular: Negative.    Gastrointestinal: Negative.    Genitourinary: Negative.    Neurological: Negative.    Psychiatric/Behavioral: Positive for depressed mood.       Vitals:    09/29/22 1236   BP: 128/80   BP  Location: Left arm   Patient Position: Sitting   Cuff Size: Adult   Pulse: 67   Temp: 98.4 °F (36.9 °C)   TempSrc: Infrared   SpO2: 98%   Weight: 81.6 kg (180 lb)     Body mass index is 30.9 kg/m².    Physical Exam  Vitals and nursing note reviewed.   Constitutional:       General: She is not in acute distress.     Appearance: Normal appearance. She is well-developed. She is not diaphoretic.   HENT:      Head: Normocephalic and atraumatic.      Right Ear: External ear normal.      Left Ear: External ear normal.      Mouth/Throat:      Mouth: Mucous membranes are moist.      Pharynx: No oropharyngeal exudate.   Eyes:      General: No scleral icterus.        Right eye: No discharge.      Conjunctiva/sclera: Conjunctivae normal.   Neck:      Thyroid: No thyromegaly.      Vascular: No JVD.      Trachea: No tracheal deviation.   Cardiovascular:      Rate and Rhythm: Normal rate and regular rhythm.      Heart sounds: Normal heart sounds.      Comments: PMI nondisplaced  Pulmonary:      Effort: Pulmonary effort is normal.      Breath sounds: Normal breath sounds. No wheezing or rales.   Abdominal:      General: Bowel sounds are normal.      Palpations: Abdomen is soft.      Tenderness: There is no abdominal tenderness. There is no guarding or rebound.   Musculoskeletal:      Cervical back: Normal range of motion and neck supple.      Comments: Normal gait   Lymphadenopathy:      Cervical: No cervical adenopathy.   Skin:     General: Skin is warm and dry.      Capillary Refill: Capillary refill takes less than 2 seconds.      Coloration: Skin is not pale.      Findings: No rash.   Neurological:      Mental Status: She is alert and oriented to person, place, and time.      Motor: No abnormal muscle tone.      Coordination: Coordination normal.   Psychiatric:         Judgment: Judgment normal.      Comments: Flat affect         Procedures    Results Review:    None    Assessment/Plan:     Problem List Items Addressed This  Visit        Cardiac and Vasculature    Essential hypertension - Primary    Relevant Medications    losartan (COZAAR) 100 MG tablet    terazosin (HYTRIN) 5 MG capsule    dilTIAZem CD (Cartia XT) 300 MG 24 hr capsule    carvedilol (COREG) 12.5 MG tablet    Other Relevant Orders    MicroAlbumin, Urine, Random - Urine, Clean Catch    Hemoglobin A1c    Basic metabolic panel    Dyslipidemia    Current Assessment & Plan     Currently tolerating Lipitor 80 mg daily.  Lipid panel in follow-up            Endocrine and Metabolic    Uncontrolled type 2 diabetes mellitus with hyperglycemia (HCC) (Chronic)    Current Assessment & Plan     Diabetes is worsening.   Continue current treatment regimen.  Reminded to bring in blood sugar diary at next visit.  Dietary recommendations for ADA diet.  Regular aerobic exercise.  Reminded to get yearly retinal exam.  I suspect her depression is waiting into her apathy about her disease.  I requested a urine microalbumin and hemoglobin A1c.  We also discussed the potential addition of a GLP-1 agonist to facilitate glycemic control and weight loss.  The recommendations will follow once results are obtained  Diabetes will be reassessed in 3 months.         Relevant Medications    Lancets misc    glucose monitor monitoring kit    glucose blood test strip    empagliflozin (Jardiance) 10 MG tablet tablet    metFORMIN (GLUCOPHAGE) 1000 MG tablet    glipizide (GLUCOTROL XL) 10 MG 24 hr tablet    Other Relevant Orders    MicroAlbumin, Urine, Random - Urine, Clean Catch    Hemoglobin A1c    Basic metabolic panel    Microalbuminuria due to type 2 diabetes mellitus (HCC) (Chronic)    Relevant Medications    empagliflozin (Jardiance) 10 MG tablet tablet    metFORMIN (GLUCOPHAGE) 1000 MG tablet    glipizide (GLUCOTROL XL) 10 MG 24 hr tablet    Other Relevant Orders    MicroAlbumin, Urine, Random - Urine, Clean Catch    Hemoglobin A1c    Basic metabolic panel       Mental Health    Moderate episode of  recurrent major depressive disorder (HCC)    Current Assessment & Plan     Patient's depression is recurrent and is moderate without psychosis. Their depression is currently active and the condition is worsening. This will be reassessed in 3 months. F/U as described:patient referred to Mental Health Specialist.  I offered increasing Effexor however she declined.  I have given her the name of a counselor.         Relevant Medications    venlafaxine XR (EFFEXOR-XR) 75 MG 24 hr capsule       Neuro    Acute CVA (cerebrovascular accident) (HCC)    Overview     · CT head 11/26/2020: No acute intracranial abnormality, age-related changes of the brain including likely chronic right frontal lobe lacunar infarct   · CT cerebral perfusion 11/26/2020: Findings consistent with focal right PCA territory ischemia and larger area of slow flow.  No apparent core infarct.  · CTA head/neck (11/26/2020): Truncated appearance of the proximal right posterior cerebral artery consistent with patient's perfusion scan abnormality, potentially 50% or greater distal left cavernous carotid artery stenosis  · Echocardiogram (11/27/2020): LVEF 65%, LV wall thickness consistent with concentric hypertrophy, the cardiac valves are anatomically and functionally normal, saline test results negative  · Transcranial Doppler (11/27/2020): Normal mean flow velocities of bilateral MCA, terminal ICA.  No Doppler evidence suggestive of PFO  · Mobile cardiac outpatient telemetry monitor (12/14/2020): Normal monitor study  · Negative hypercoagulable work-up  · MRI brain 11/26/2020: Scattered small old vessel ischemic changes, no evidence of recent ischemia  · Mild residual left-sided weakness  · [facility] 3/11/2021 overnight admission for TIA in the setting of KENYA, dehydration  · CT head (3/11/2021):  Stable appearance of the brain with no evidence of acute intracranial abnormality  · Transcranial Doppler (3/11/2021): Normal mean flow velocities of bilateral MCA,  terminal ICA.  No Doppler evidence suggestive of PFO  · MRI brain (3/11/2021): No acute infarction, mild to moderate chronic small vessel ischemic disease findings somewhat advanced for patient age, however, no focal or generalized atrophy  · K[facility]URRAM (4/19/2021):   · Loop recorder implant ([facility]), 4/19/2021         Relevant Medications    aspirin 81 MG chewable tablet    atorvastatin (LIPITOR) 80 MG tablet    clopidogrel (PLAVIX) 75 MG tablet      Other Visit Diagnoses     Amenorrhea        Relevant Medications    venlafaxine XR (EFFEXOR-XR) 75 MG 24 hr capsule    Need for vaccination        Relevant Orders    COVID-19 Bivalent Booster (Pfizer) 12+yrs (Completed)    FluLaval/Fluzone >6 mos ([id number]) (Completed)      Have encouraged her to schedule her colonoscopy.    Plan of care reviewed with patient at the conclusion of today's visit. Education was provided regarding diagnosis and management.  Patient verbalizes understanding of and agreement with management plan.    Return in about 3 months (around 12/29/2022) for Annual.    Neftali Mccain MD      Please note than portions of this note were completed wt a Voice Recognition Program          Answers for HPI/ROS submitted by the patient on 9/28/2022  What is the primary reason for your visit?: High Blood Pressure

## 2022-09-29 NOTE — ASSESSMENT & PLAN NOTE
Diabetes is worsening.   Continue current treatment regimen.  Reminded to bring in blood sugar diary at next visit.  Dietary recommendations for ADA diet.  Regular aerobic exercise.  Reminded to get yearly retinal exam.  I suspect her depression is waiting into her apathy about her disease.  I requested a urine microalbumin and hemoglobin A1c.  We also discussed the potential addition of a GLP-1 agonist to facilitate glycemic control and weight loss.  The recommendations will follow once results are obtained  Diabetes will be reassessed in 3 months.

## 2022-09-29 NOTE — ASSESSMENT & PLAN NOTE
Patient's depression is recurrent and is moderate without psychosis. Their depression is currently active and the condition is worsening. This will be reassessed in 3 months. F/U as described:patient referred to Mental Health Specialist.  I offered increasing Effexor however she declined.  I have given her the name of a counselor.

## 2022-10-12 NOTE — TELEPHONE ENCOUNTER
Caller: ArayaRosanne    Relationship: Self    Best call back number:  [phone]     Requested Prescriptions:   Requested Prescriptions     Pending Prescriptions Disp Refills   • terazosin (HYTRIN) 5 MG capsule 90 capsule 1     Sig: Take 1 capsule by mouth Every Night.        Pharmacy where request should be sent: [facility] [address] [place] [address] [phone] [facility] [phone] [facility]     Additional details provided by patient:  PATIENT SAID THIS PRESCRIPTION WAS WRITTEN BEFORE BY HER HEART DOCTOR THAT HAS RETIRED AND SHE IS REQUESTING A PRESCRIPTION WRITTEN BY DR BUI   SHE TOOK HER LAST TABLET TODAY     SHE IS REQUESTING A 90 DAY SUPPLY       Does the patient have less than a 3 day supply:  [x] Yes  [] No

## 2022-10-13 RX ORDER — TERAZOSIN 5 MG/1
5 CAPSULE ORAL NIGHTLY
Qty: 90 CAPSULE | Refills: 1 | Status: SHIPPED | OUTPATIENT
Start: 2022-10-13

## 2022-10-13 NOTE — TELEPHONE ENCOUNTER
Rx Refill Note  Requested Prescriptions     Pending Prescriptions Disp Refills   • terazosin (HYTRIN) 5 MG capsule 90 capsule 1     Sig: Take 1 capsule by mouth Every Night.      Last office visit with prescribing clinician: 9/29/2022      Next office visit with prescribing clinician: Visit date not found            Malu Banuelos MA  10/13/22, 08:57 EDT

## 2022-11-24 PROCEDURE — 93298 REM INTERROG DEV EVAL SCRMS: CPT | Performed by: INTERNAL MEDICINE

## 2022-11-24 PROCEDURE — G2066 INTER DEVC REMOTE 30D: HCPCS | Performed by: INTERNAL MEDICINE

## 2023-01-16 PROBLEM — Z86.73 HISTORY OF CVA (CEREBROVASCULAR ACCIDENT): Status: ACTIVE | Noted: 2020-11-26

## 2023-01-26 ENCOUNTER — TELEPHONE (OUTPATIENT)
Dept: CARDIOLOGY | Facility: CLINIC | Age: 58
End: 2023-01-26
Payer: COMMERCIAL

## 2023-01-26 NOTE — TELEPHONE ENCOUNTER
Called Mrs Araya due to [employer] monitor isn't reading loop recorder.  She will check connections and call if she is unable to get reconnected.

## 2023-02-24 ENCOUNTER — OFFICE VISIT (OUTPATIENT)
Dept: FAMILY MEDICINE CLINIC | Facility: CLINIC | Age: 58
End: 2023-02-24
Payer: COMMERCIAL

## 2023-02-24 VITALS
BODY MASS INDEX: 31.79 KG/M2 | SYSTOLIC BLOOD PRESSURE: 124 MMHG | HEIGHT: 64 IN | HEART RATE: 96 BPM | WEIGHT: 186.2 LBS | OXYGEN SATURATION: 98 % | DIASTOLIC BLOOD PRESSURE: 82 MMHG

## 2023-02-24 DIAGNOSIS — E78.00 HIGH CHOLESTEROL: ICD-10-CM

## 2023-02-24 DIAGNOSIS — Z12.31 ENCOUNTER FOR SCREENING MAMMOGRAM FOR MALIGNANT NEOPLASM OF BREAST: Primary | ICD-10-CM

## 2023-02-24 DIAGNOSIS — E11.65 TYPE 2 DIABETES MELLITUS WITH HYPERGLYCEMIA, WITHOUT LONG-TERM CURRENT USE OF INSULIN: ICD-10-CM

## 2023-02-24 DIAGNOSIS — E11.65 UNCONTROLLED TYPE 2 DIABETES MELLITUS WITH HYPERGLYCEMIA: ICD-10-CM

## 2023-02-24 DIAGNOSIS — E11.29 MICROALBUMINURIA DUE TO TYPE 2 DIABETES MELLITUS: ICD-10-CM

## 2023-02-24 DIAGNOSIS — R80.9 MICROALBUMINURIA DUE TO TYPE 2 DIABETES MELLITUS: ICD-10-CM

## 2023-02-24 DIAGNOSIS — I10 ESSENTIAL HYPERTENSION: ICD-10-CM

## 2023-02-24 DIAGNOSIS — E78.5 DYSLIPIDEMIA: ICD-10-CM

## 2023-02-24 LAB
ALBUMIN UR-MCNC: 12 MG/DL
EXPIRATION DATE: ABNORMAL
HBA1C MFR BLD: 7.8 %
Lab: ABNORMAL

## 2023-02-24 PROCEDURE — 83036 HEMOGLOBIN GLYCOSYLATED A1C: CPT | Performed by: INTERNAL MEDICINE

## 2023-02-24 PROCEDURE — 99214 OFFICE O/P EST MOD 30 MIN: CPT | Performed by: INTERNAL MEDICINE

## 2023-02-24 PROCEDURE — 82043 UR ALBUMIN QUANTITATIVE: CPT | Performed by: INTERNAL MEDICINE

## 2023-02-24 RX ORDER — TIRZEPATIDE 2.5 MG/.5ML
2.5 INJECTION, SOLUTION SUBCUTANEOUS WEEKLY
Qty: 2 ML | Refills: 1 | Status: SHIPPED | OUTPATIENT
Start: 2023-02-24

## 2023-02-24 NOTE — ASSESSMENT & PLAN NOTE
She continues on intensive statin after her CVA.  Fasting lipid profile requested.  Recommended Mediterranean type diet.

## 2023-02-24 NOTE — ASSESSMENT & PLAN NOTE
Diabetes is worsening.   Reminded to bring in blood sugar diary at next visit.  Dietary recommendations for ADA diet.  Regular aerobic exercise.  Reminded to get yearly retinal exam.  Medication changes per orders.  Diabetes will be reassessed in 1 month.  We did test the hypothesis that perhaps her nauseousness was severe secondary to declining gallbladder function.  Given her worsening A1c, we have placed her on Mounjaro 2.5 mg daily.  She will increase the frequency of her fingerstick blood sugars.  Side effect profile was discussed.  She can use Zofran as needed which she has at home.

## 2023-02-24 NOTE — PROGRESS NOTES
Rosanne Araya  1965  [id number]  Patient Care Team:  Neftali Mccain MD as PCP - General (Internal Medicine)  Gio Julian MD as Consulting Physician (Neurology)  Royal Lopez MD as Gynecologist (Gynecology)  Omer Hernandez MD as Consulting Physician (Dermatology)  Addie Mckeon PA-C as Physician Assistant (Endocrinology)  Marivel Becker APRN as Nurse Practitioner (Cardiology)    Rosanne Araya is a 57 y.o. female here today for follow up.     This patient is accompanied by their self who contributes to the history of their care.    Chief Complaint:    Chief Complaint   Patient presents with   • Diabetes         History of Present Illness:  I have reviewed and/or updated the patient's past medical, past surgical, family, social history, problem list and allergies as appropriate.     Still with increased stress with son as well as marital discord. She has not reached out to a counselor. Looking into self help readings. Continues on effexor 75 mg. Walks daily for exercise.  Denies HI or SI.  She declines dose increase in effexor. Just recently saw opth. She checks fsbs, eating poorly. Denies polyuria/dipsia.  Has any hypoglycemic episodes.  Blood pressures are under good control at home.  She does continue to take Lipitor 80 mg daily aspirin and Plavix.  She has been fine on her healthcare maintenance.  She feels her.'s are slowing.  She does not experience an increase in appetite around the beginning of her cycle.    She tried Ozempic however stopped secondary to nauseous however this was just before her cholecystectomy for cholecystitis.    Review of Systems   Constitutional: Positive for appetite change and unexpected weight gain.   HENT: Negative.    Eyes: Negative.    Respiratory: Negative.    Cardiovascular: Negative.    Gastrointestinal: Negative.    Endocrine: Negative.    Genitourinary: Negative.    Psychiatric/Behavioral: Positive for depressed mood. The patient is  "nervous/anxious.        Vitals:    02/24/23 0905   BP: 124/82   Pulse: 96   SpO2: 98%   Weight: 84.5 kg (186 lb 3.2 oz)   Height: 162.6 cm (64.02\")     Body mass index is 31.95 kg/m².    Physical Exam  Vitals and nursing note reviewed.   Constitutional:       General: She is not in acute distress.     Appearance: She is well-developed. She is not diaphoretic.   HENT:      Head: Normocephalic and atraumatic.      Right Ear: External ear normal.      Left Ear: External ear normal.      Mouth/Throat:      Pharynx: No oropharyngeal exudate.   Eyes:      General: No scleral icterus.        Right eye: No discharge.      Conjunctiva/sclera: Conjunctivae normal.   Neck:      Thyroid: No thyromegaly.      Vascular: No JVD.      Trachea: No tracheal deviation.   Cardiovascular:      Rate and Rhythm: Normal rate and regular rhythm.      Pulses:           Dorsalis pedis pulses are 2+ on the right side and 2+ on the left side.        Posterior tibial pulses are 2+ on the right side and 2+ on the left side.      Heart sounds: Normal heart sounds.      Comments: PMI nondisplaced  Pulmonary:      Effort: Pulmonary effort is normal.      Breath sounds: Normal breath sounds. No wheezing or rales.   Abdominal:      General: Bowel sounds are normal.      Palpations: Abdomen is soft.      Tenderness: There is no abdominal tenderness. There is no guarding or rebound.   Musculoskeletal:      Cervical back: Normal range of motion and neck supple.      Comments: Normal gait   Feet:      Right foot:      Protective Sensation: 5 sites tested. 5 sites sensed.      Skin integrity: Skin integrity normal.      Left foot:      Protective Sensation: 5 sites tested. 5 sites sensed.      Skin integrity: Skin integrity normal.   Lymphadenopathy:      Cervical: No cervical adenopathy.   Skin:     General: Skin is warm and dry.      Capillary Refill: Capillary refill takes less than 2 seconds.      Coloration: Skin is not pale.      Findings: No rash. "   Neurological:      Mental Status: She is alert and oriented to person, place, and time.      Motor: No abnormal muscle tone.      Coordination: Coordination normal.   Psychiatric:         Judgment: Judgment normal.         Procedures    Results Review:    I reviewed the patient's new clinical results.    Assessment/Plan:     Problem List Items Addressed This Visit        Cardiac and Vasculature    Essential hypertension    Current Assessment & Plan     Hypertension is improving with treatment.  Continue current treatment regimen.  Dietary sodium restriction.  Weight loss.  Regular aerobic exercise.  Continue current medications.  Blood pressure will be reassessed in 3 months.         Relevant Medications    losartan (COZAAR) 100 MG tablet    dilTIAZem CD (Cartia XT) 300 MG 24 hr capsule    carvedilol (COREG) 12.5 MG tablet    terazosin (HYTRIN) 5 MG capsule    Other Relevant Orders    CBC & Differential    Comprehensive Metabolic Panel    TSH Rfx On Abnormal To Free T4    Dyslipidemia    Current Assessment & Plan     She continues on intensive statin after her CVA.  Fasting lipid profile requested.  Recommended Mediterranean type diet.         Relevant Orders    Comprehensive Metabolic Panel    Lipid Panel    RESOLVED: High cholesterol    Relevant Medications    atorvastatin (LIPITOR) 80 MG tablet       Endocrine and Metabolic    Uncontrolled type 2 diabetes mellitus with hyperglycemia (HCC) (Chronic)    Current Assessment & Plan     Diabetes is worsening.   Reminded to bring in blood sugar diary at next visit.  Dietary recommendations for ADA diet.  Regular aerobic exercise.  Reminded to get yearly retinal exam.  Medication changes per orders.  Diabetes will be reassessed in 1 month.  We did test the hypothesis that perhaps her nauseousness was severe secondary to declining gallbladder function.  Given her worsening A1c, we have placed her on Mounjaro 2.5 mg daily.  She will increase the frequency of her  fingerstick blood sugars.  Side effect profile was discussed.  She can use Zofran as needed which she has at home.         Relevant Medications    Lancets misc    glucose monitor monitoring kit    glucose blood test strip    empagliflozin (Jardiance) 10 MG tablet tablet    metFORMIN (GLUCOPHAGE) 1000 MG tablet    glipizide (GLUCOTROL XL) 10 MG 24 hr tablet    Tirzepatide (Mounjaro) 2.5 MG/0.5ML solution pen-injector    Other Relevant Orders    POC Glycosylated Hemoglobin (Hb A1C) (Completed)    Microalbumin / Creatinine Urine Ratio - Urine, Clean Catch    Microalbuminuria due to type 2 diabetes mellitus (HCC) (Chronic)    Relevant Medications    empagliflozin (Jardiance) 10 MG tablet tablet    metFORMIN (GLUCOPHAGE) 1000 MG tablet    glipizide (GLUCOTROL XL) 10 MG 24 hr tablet    Tirzepatide (Mounjaro) 2.5 MG/0.5ML solution pen-injector   Other Visit Diagnoses     Encounter for screening mammogram for malignant neoplasm of breast    -  Primary    Relevant Orders    Mammo Screening Digital Tomosynthesis Bilateral With CAD    Type 2 diabetes mellitus with hyperglycemia, without long-term current use of insulin (HCC)        Relevant Medications    Tirzepatide (Mounjaro) 2.5 MG/0.5ML solution pen-injector          Plan of care reviewed with patient at the conclusion of today's visit. Education was provided regarding diagnosis and management.  Patient verbalizes understanding of and agreement with management plan.    Return in about 6 weeks (around 4/7/2023) for Annual, mounjar.    Neftali Mccain MD      Please note than portions of this note were completed [facility] a Voice Recognition Program           KATHLEEN

## 2023-03-14 ENCOUNTER — PRIOR AUTHORIZATION (OUTPATIENT)
Dept: FAMILY MEDICINE CLINIC | Facility: CLINIC | Age: 58
End: 2023-03-14
Payer: COMMERCIAL

## 2023-03-14 NOTE — TELEPHONE ENCOUNTER
(Key: [id number])  Med:Mounjaro 2.5MG/0.5ML pen-injectors  Status:sent to plan, awaiting determination   Created:03/14/2023

## 2023-04-12 ENCOUNTER — OFFICE VISIT (OUTPATIENT)
Dept: FAMILY MEDICINE CLINIC | Facility: CLINIC | Age: 58
End: 2023-04-12
Payer: COMMERCIAL

## 2023-04-12 VITALS
HEART RATE: 73 BPM | TEMPERATURE: 96.9 F | SYSTOLIC BLOOD PRESSURE: 136 MMHG | RESPIRATION RATE: 16 BRPM | WEIGHT: 187 LBS | OXYGEN SATURATION: 97 % | HEIGHT: 64 IN | DIASTOLIC BLOOD PRESSURE: 84 MMHG | BODY MASS INDEX: 31.92 KG/M2

## 2023-04-12 DIAGNOSIS — E78.5 DYSLIPIDEMIA: ICD-10-CM

## 2023-04-12 DIAGNOSIS — E11.29 MICROALBUMINURIA DUE TO TYPE 2 DIABETES MELLITUS: Chronic | ICD-10-CM

## 2023-04-12 DIAGNOSIS — F41.9 ANXIETY AND DEPRESSION: Primary | ICD-10-CM

## 2023-04-12 DIAGNOSIS — J00 ACUTE RHINITIS: ICD-10-CM

## 2023-04-12 DIAGNOSIS — I10 ESSENTIAL HYPERTENSION: ICD-10-CM

## 2023-04-12 DIAGNOSIS — R80.9 MICROALBUMINURIA DUE TO TYPE 2 DIABETES MELLITUS: Chronic | ICD-10-CM

## 2023-04-12 DIAGNOSIS — F32.A ANXIETY AND DEPRESSION: Primary | ICD-10-CM

## 2023-04-12 DIAGNOSIS — Z00.00 ENCOUNTER FOR ROUTINE HISTORY AND PHYSICAL EXAM IN FEMALE: ICD-10-CM

## 2023-04-12 DIAGNOSIS — Z00.00 ANNUAL PHYSICAL EXAM: ICD-10-CM

## 2023-04-12 DIAGNOSIS — E11.65 UNCONTROLLED TYPE 2 DIABETES MELLITUS WITH HYPERGLYCEMIA: Chronic | ICD-10-CM

## 2023-04-12 DIAGNOSIS — R09.89 RUNNY NOSE: ICD-10-CM

## 2023-04-12 LAB
EXPIRATION DATE: NORMAL
FLUAV AG UPPER RESP QL IA.RAPID: NOT DETECTED
FLUBV AG UPPER RESP QL IA.RAPID: NOT DETECTED
INTERNAL CONTROL: NORMAL
Lab: NORMAL
SARS-COV-2 AG UPPER RESP QL IA.RAPID: NOT DETECTED

## 2023-04-12 NOTE — ASSESSMENT & PLAN NOTE
Secondary to her allergic rhinitis.  If she does develop fevers given her COVID exposure she will notify the office for possible Paxlovid therapy.  Recommend avoiding close contact with her  who is positive

## 2023-04-12 NOTE — PROGRESS NOTES
Rosanne Araya  1965  [id number]  Patient Care Team:  Neftali Mccain MD as PCP - General (Internal Medicine)  Gio Julian MD as Consulting Physician (Neurology)  Royal Lopez MD as Gynecologist (Gynecology)  Omer Hernandez MD as Consulting Physician (Dermatology)  Addie Mckeon PA-C as Physician Assistant (Endocrinology)  Marivel Becker APRN as Nurse Practitioner (Cardiology)    Rosanne Araya is a 57 y.o. female here today for annual exam.  This patient is accompanied by their self who contributes to the history of their care.    Chief Complaint:    Chief Complaint   Patient presents with   • Diabetes     6 week follow up; mounjaro   • Med Refill   • Exposure To Known Illness   • Annual Exam         History of Present Illness:     Was originally here for annual exam, however she has been exposed to Covid (  + l;ast night.... she reports symptoms of sore throat ears clogged and drainage x 1 week. Denies fevers/chills. + cough. She thinks it could be allergies.   Denies SOA/Wheezing.     She did not start mounjaro 2/2 to cost. She has ozempic still at home from prior Rx. Remains on glipizide, metformin and jardiance. ( not checking sugars). Exercising more and eating better.  She feels better.  Still struggling with her relationship.  Her son is now in counseling.  She herself has not contacted the counselor said his name have given.    Blood pressures running okay at home.  No chest pain shortness of breath orthopnea or PND.  She is current on colonoscopy has upcoming mammogram scheduled.  She does need to see gynecology.  She still has an IUD in place and is thinking about having ember removed.    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  She would like shingles vaccination.  She is agreeable to pneumonia 20 however we do not have this in stock today    Past Medical History:   Diagnosis Date   • Depression    • Diabetes mellitus    • Herpes     • Hypertension    • IUD (intrauterine device) in place     due to menorrhagia   • Stroke (cerebrum)        Past Surgical History:   Procedure Laterality Date   • ADENOIDECTOMY     • BACK SURGERY      disc slipped   • CARDIAC ELECTROPHYSIOLOGY PROCEDURE N/A 2021    Procedure: Loop insertion- getting c19 elsewhere in [place] and aware to do on  and bring results;  Surgeon: Adonis Eng IV, MD;  Location: [facility] INVASIVE LOCATION;  Service: Cardiovascular;  Laterality: N/A;   •  SECTION     • CHOLECYSTECTOMY     • GALLBLADDER SURGERY     • TONSILLECTOMY AND ADENOIDECTOMY     • TUBAL ABDOMINAL LIGATION     • WISDOM TOOTH EXTRACTION          Family History   Problem Relation Age of Onset   • Thyroid disease Mother    • Breast cancer Maternal Aunt 45   • Hypertension Maternal Grandmother    • Diabetes Maternal Grandmother    • Stroke Maternal Grandmother    • Heart attack Maternal Grandmother    • Hypertension Maternal Grandfather    • Diabetes Maternal Grandfather    • Hypertension Paternal Grandmother    • Diabetes Paternal Grandmother    • Heart failure Paternal Grandmother    • Hypertension Paternal Grandfather    • Heart attack Paternal Grandfather    • Ovarian cancer Neg Hx        Social History     Socioeconomic History   • Marital status:    Tobacco Use   • Smoking status: Former     Packs/day: 0.25     Years: 9.00     Pack years: 2.25     Types: Cigarettes     Start date: 1977     Quit date: 1986     Years since quittin.3   • Smokeless tobacco: Never   Vaping Use   • Vaping Use: Never used   Substance and Sexual Activity   • Alcohol use: Not Currently   • Drug use: Not Currently     Types: Marijuana     Comment: once weekly, last use 20   • Sexual activity: Yes     Partners: Male     Birth control/protection: I.U.D.       No Known Allergies    Depression: PHQ-2 Depression Screening  Little interest or pleasure in doing things?  0  "  Feeling down, depressed, or hopeless?  0   PHQ-2 Total Score  0      Immunization History   Administered Date(s) Administered   • COVID-19 (MODERNA) 1st, 2nd, 3rd Dose Only 10/06/2021   • COVID-19 (PFIZER) BIVALENT BOOSTER 12+YRS 09/29/2022   • COVID-19 (PFIZER) PURPLE CAP 01/20/2021, 02/09/2021   • FluLaval/Fluzone >6mos 09/29/2022   • Flublock Quad =>18yrs 10/29/2020   • Hepatitis A 02/26/2020   • Hepatitis B 02/16/2000   • Influenza Injectable Mdck Pf Quad 09/29/2022   • Influenza, Unspecified 11/01/2008, 09/16/2009   • Pneumococcal Polysaccharide (PPSV23) 01/24/2017, 08/05/2020   • Tdap 10/12/2010, 02/26/2020       Intimate partner violence ( Screen on initial visit, pregnant women, women with injuries, older adult with injury or evidence of neglect):  -Violence can be a problem in many people's lives, so I now ask every patient about trauma or abuse they may have experienced in a relationship.  • Stress/Safety - Do you feel safe in your relationship? y  • Afraid/Abused - Have you ever been in a relationship where you were threatened, hurt, or afraid? n  • Friend/Family - Are your friends aware you have been hurt?n  • Emergency Plan - Do you have a safe place to go and the resources you need in an emergency? na    Review of Systems:    Review of Systems   Constitutional: Positive for fatigue.   HENT: Positive for postnasal drip, rhinorrhea, sinus pressure and sore throat.    Eyes: Negative.    Respiratory: Positive for cough.    Cardiovascular: Negative.    Gastrointestinal: Negative.    Endocrine: Negative.    Genitourinary: Negative.    Musculoskeletal: Negative.    Allergic/Immunologic: Negative.    Neurological: Negative.    Psychiatric/Behavioral: Negative.        Vitals:    04/12/23 0835   BP: 136/84   Pulse: 73   Resp: 16   Temp: 96.9 °F (36.1 °C)   TempSrc: Infrared   SpO2: 97%   Weight: 84.8 kg (187 lb)   Height: 162.6 cm (64.02\")     Body mass index is 32.08 kg/m².      Current Outpatient " Medications:   •  aspirin 81 MG chewable tablet, Chew 1 tablet Daily., Disp: 90 tablet, Rfl: 1  •  atorvastatin (LIPITOR) 80 MG tablet, Take 1 tablet by mouth Every Night., Disp: 90 tablet, Rfl: 3  •  carvedilol (COREG) 12.5 MG tablet, Take 1 tablet by mouth 2 (Two) Times a Day., Disp: 180 tablet, Rfl: 3  •  clopidogrel (PLAVIX) 75 MG tablet, Take 1 tablet by mouth Daily., Disp: 90 tablet, Rfl: 1  •  dilTIAZem CD (Cartia XT) 300 MG 24 hr capsule, Take 1 capsule by mouth Daily., Disp: 90 capsule, Rfl: 1  •  empagliflozin (Jardiance) 10 MG tablet tablet, Take 1 tablet by mouth Daily., Disp: 90 tablet, Rfl: 1  •  glipizide (GLUCOTROL XL) 10 MG 24 hr tablet, Take 10 mg daily in the morning, Disp: 90 tablet, Rfl: 1  •  glucose blood test strip, Check BS BID, Disp: 100 each, Rfl: 1  •  glucose monitor monitoring kit, Use BID to check BS, Disp: 1 each, Rfl: 1  •  Lancets misc, Check BS BID, Disp: 100 each, Rfl: 1  •  losartan (COZAAR) 100 MG tablet, Take 1 tablet by mouth Daily., Disp: 90 tablet, Rfl: 1  •  metFORMIN (GLUCOPHAGE) 1000 MG tablet, Take 1 tablet by mouth 2 (Two) Times a Day With Meals., Disp: 180 tablet, Rfl: 0  •  terazosin (HYTRIN) 5 MG capsule, Take 1 capsule by mouth Every Night., Disp: 90 capsule, Rfl: 1  •  venlafaxine XR (EFFEXOR-XR) 75 MG 24 hr capsule, Take 3 capsules by mouth Daily., Disp: 90 capsule, Rfl: 1  •  Semaglutide,0.25 or 0.5MG/DOS, (OZEMPIC) 2 MG/1.5ML solution pen-injector, Inject 0.25 mg under the skin into the appropriate area as directed 1 (One) Time Per Week., Disp: 3 mL, Rfl: 1  •  Zoster Vac Recomb Adjuvanted 50 MCG/0.5ML reconstituted suspension, Inject 0.5 mL into the appropriate muscle as directed by prescriber 1 (One) Time for 1 dose., Disp: 1 each, Rfl: 0    Physical Exam:    Physical Exam  Vitals and nursing note reviewed.   Constitutional:       General: She is not in acute distress.     Appearance: Normal appearance. She is well-developed. She is not diaphoretic.   HENT:       Head: Normocephalic and atraumatic.      Right Ear: External ear normal.      Left Ear: External ear normal.      Nose: Rhinorrhea present.      Mouth/Throat:      Pharynx: No oropharyngeal exudate.   Eyes:      General: No scleral icterus.        Right eye: No discharge.         Left eye: No discharge.      Extraocular Movements: Extraocular movements intact.      Conjunctiva/sclera: Conjunctivae normal.   Neck:      Thyroid: No thyromegaly.      Vascular: No JVD.      Trachea: No tracheal deviation.   Cardiovascular:      Rate and Rhythm: Normal rate and regular rhythm.      Pulses: Normal pulses.      Heart sounds: Normal heart sounds.      Comments: PMI nondisplaced  Pulmonary:      Effort: Pulmonary effort is normal.      Breath sounds: Normal breath sounds. No wheezing or rales.   Abdominal:      General: Bowel sounds are normal.      Palpations: Abdomen is soft.      Tenderness: There is no abdominal tenderness. There is no guarding or rebound.   Musculoskeletal:      Cervical back: Normal range of motion and neck supple.      Comments: Normal gait   Lymphadenopathy:      Cervical: No cervical adenopathy.   Skin:     General: Skin is warm and dry.      Capillary Refill: Capillary refill takes less than 2 seconds.      Coloration: Skin is not pale.      Findings: No rash.   Neurological:      Mental Status: She is alert and oriented to person, place, and time.      Motor: No abnormal muscle tone.      Coordination: Coordination normal.   Psychiatric:         Mood and Affect: Mood normal.         Behavior: Behavior normal.         Judgment: Judgment normal.         Procedures    Results Review:    I reviewed the patient's new clinical results.  COVID, flu negative    Assessment/Plan:    Problem List Items Addressed This Visit        Cardiac and Vasculature    Essential hypertension    Relevant Medications    losartan (COZAAR) 100 MG tablet    dilTIAZem CD (Cartia XT) 300 MG 24 hr capsule    carvedilol (COREG)  12.5 MG tablet    terazosin (HYTRIN) 5 MG capsule    Dyslipidemia       ENT    Acute rhinitis    Current Assessment & Plan     Secondary to her allergic rhinitis.  If she does develop fevers given her COVID exposure she will notify the office for possible Paxlovid therapy.  Recommend avoiding close contact with her  who is positive            Endocrine and Metabolic    Uncontrolled type 2 diabetes mellitus with hyperglycemia (Chronic)    Relevant Medications    Lancets misc    glucose monitor monitoring kit    glucose blood test strip    empagliflozin (Jardiance) 10 MG tablet tablet    glipizide (GLUCOTROL XL) 10 MG 24 hr tablet    Semaglutide,0.25 or 0.5MG/DOS, (OZEMPIC) 2 MG/1.5ML solution pen-injector    metFORMIN (GLUCOPHAGE) 1000 MG tablet    Microalbuminuria due to type 2 diabetes mellitus (Chronic)    Relevant Medications    empagliflozin (Jardiance) 10 MG tablet tablet    glipizide (GLUCOTROL XL) 10 MG 24 hr tablet    Semaglutide,0.25 or 0.5MG/DOS, (OZEMPIC) 2 MG/1.5ML solution pen-injector    metFORMIN (GLUCOPHAGE) 1000 MG tablet       Health Encounters    Annual physical exam       Mental Health    Anxiety and depression - Primary    Relevant Medications    venlafaxine XR (EFFEXOR-XR) 75 MG 24 hr capsule   Other Visit Diagnoses     Encounter for routine history and physical exam in female        Relevant Orders    Ambulatory Referral to Gynecology          Plan of care was reviewed with patient at the conclusion of today's visit. Counseled patient with regards to good nutrition and diet. Maintaining a healthy lifestyle including exercise and physical activities. Spoke with patient on ways to reduce stress, getting adequate sleep and injury prevention.  Discussed mammogram, colon cancer screening, osteoporosis and pap smear including benefit of early detection and potential need for follow-up. Patient agrees to screening mammogram, colonoscopy, bone density and gyn referral today. Annual dental and  eye exams were encouraged. Encouraged patient to continue to follow up with annual immunizations.     Return in about 3 months (around 7/12/2023) for dm/htn.    Neftali Mccain MD    Please note than portions of this note were completed wt a Voice Recognition Program

## 2023-05-18 ENCOUNTER — TELEPHONE (OUTPATIENT)
Dept: CARDIOLOGY | Facility: CLINIC | Age: 58
End: 2023-05-18
Payer: COMMERCIAL

## 2023-05-18 DIAGNOSIS — E11.65 UNCONTROLLED TYPE 2 DIABETES MELLITUS WITH HYPERGLYCEMIA: Chronic | ICD-10-CM

## 2023-05-18 NOTE — TELEPHONE ENCOUNTER
Rx Refill Note  Requested Prescriptions     Pending Prescriptions Disp Refills   • metFORMIN (GLUCOPHAGE) 1000 MG tablet [Pharmacy Med Name: metFORMIN HCl 1000 MG Oral Tablet] 180 tablet 0     Sig: TAKE 1 TABLET BY MOUTH TWICE DAILY WITH MEALS      Last office visit with prescribing clinician: 4/12/2023   Last telemedicine visit with prescribing clinician: 4/12/2023   Next office visit with prescribing clinician: 7/12/2023                         Would you like a call back once the refill request has been completed: [] Yes [] No    If the office needs to give you a call back, can they leave a voicemail: [] Yes [] No    Ana Ling MA  05/18/23, 11:47 EDT

## 2023-05-18 NOTE — TELEPHONE ENCOUNTER
Per [employer] web site (loop monitor), pt’s home monitor isn't communicating with the web site. Left voice mail message advising her to check the power cord connections, then to power the monitor off & back on. Provided call back phone number for the device clinic if assistance is needed

## 2023-05-25 DIAGNOSIS — I63.9 ACUTE CVA (CEREBROVASCULAR ACCIDENT): ICD-10-CM

## 2023-05-26 RX ORDER — ATORVASTATIN CALCIUM 80 MG/1
TABLET, FILM COATED ORAL
Qty: 90 TABLET | Refills: 0 | Status: SHIPPED | OUTPATIENT
Start: 2023-05-26

## 2023-05-26 NOTE — TELEPHONE ENCOUNTER
Rx Refill Note  Requested Prescriptions     Pending Prescriptions Disp Refills   • atorvastatin (LIPITOR) 80 MG tablet [Pharmacy Med Name: Atorvastatin Calcium 80 MG Oral Tablet] 90 tablet 0     Sig: TAKE 1 TABLET BY MOUTH ONCE DAILY AT NIGHT      Last office visit with prescribing clinician: 4/12/2023   Last telemedicine visit with prescribing clinician: Visit date not found   Next office visit with prescribing clinician: 7/12/2023                         Would you like a call back once the refill request has been completed: [] Yes [] No    If the office needs to give you a call back, can they leave a voicemail: [] Yes [] No    Marley Grimes MA  05/26/23, 08:20 EDT

## 2023-07-12 PROBLEM — E66.9 OBESITY (BMI 30-39.9): Status: ACTIVE | Noted: 2023-07-12

## 2023-07-25 ENCOUNTER — TELEPHONE (OUTPATIENT)
Dept: CARDIOLOGY | Facility: CLINIC | Age: 58
End: 2023-07-25
Payer: COMMERCIAL

## 2023-07-25 NOTE — TELEPHONE ENCOUNTER
Called due to [employer] loop recorder is not reading.  Left message to check connections and my name and number if there are any questions.

## 2023-07-26 NOTE — TELEPHONE ENCOUNTER
Mrs Araya returned my call and she is unable to get monitor to work.  Gave her [employer] # to troubleshoot.

## 2023-07-28 ENCOUNTER — OFFICE VISIT (OUTPATIENT)
Dept: OBSTETRICS AND GYNECOLOGY | Facility: CLINIC | Age: 58
End: 2023-07-28
Payer: COMMERCIAL

## 2023-07-28 VITALS
HEIGHT: 64 IN | DIASTOLIC BLOOD PRESSURE: 80 MMHG | WEIGHT: 192.8 LBS | SYSTOLIC BLOOD PRESSURE: 118 MMHG | BODY MASS INDEX: 32.91 KG/M2

## 2023-07-28 DIAGNOSIS — Z00.00 ANNUAL PHYSICAL EXAM: Primary | ICD-10-CM

## 2023-07-28 DIAGNOSIS — Z30.432 ENCOUNTER FOR IUD REMOVAL: ICD-10-CM

## 2023-07-28 DIAGNOSIS — N91.2 AMENORRHEA: ICD-10-CM

## 2023-07-28 DIAGNOSIS — Z12.31 BREAST CANCER SCREENING BY MAMMOGRAM: ICD-10-CM

## 2023-07-28 NOTE — PROGRESS NOTES
Gynecologic Annual Exam Note          GYN Annual Exam     Gynecologic Exam        Subjective     HPI  Rosanne Araya is a 57 y.o. female, , who presents for annual well woman exam as a new patient . Patient has IUD in place. She thinks it is the Mirena and has had it for approximately 5 years. She would like to have it removed today. Patient reports problems with: none.  Her periods are absent secondary to birth control.. She reports dysmenorrhea is none. Partner Status: Marital Status: . She is is not currently sexually active. STD testing recommendations have been explained to the patient and she does not desire STD testing. There were no changes to her medical or surgical history since her last visit..       Additional OB/GYN History   Current contraception: contraceptive methods: IUD.  Insertion date: ()  per patient   Desires to:  remove IUD  contraception    Last Pap : 5 years ago per patient . Result: negative. HPV: unknown .   Last Completed Pap Smear       This patient has no relevant Health Maintenance data.          History of abnormal Pap smear: no  Family history of uterine, colon, breast, or ovarian cancer: yes - MA and MGM had breast cancer  Performs monthly Self-Breast Exam: yes  Last mammogram: 2022. Done at . Negative Has next one scheduled for 2023    Last Completed Mammogram       This patient has no relevant Health Maintenance data.            History of abnormal mammogram: no    Colonoscopy: has had a colonoscopy 10 year(s) ago. Has one scheduled for 2023  Exercises Regularly: yes  Feelings of Anxiety or Depression: yes - Depression currently being treated with Effexor  Tobacco Usage?: No       Current Outpatient Medications:     atorvastatin (LIPITOR) 80 MG tablet, Take 1 tablet by mouth Every Night., Disp: 90 tablet, Rfl: 2    carvedilol (COREG) 12.5 MG tablet, Take 1 tablet by mouth 2 (Two) Times a Day., Disp: 180 tablet, Rfl: 3     clopidogrel (PLAVIX) 75 MG tablet, Take 1 tablet by mouth Daily., Disp: 90 tablet, Rfl: 1    dilTIAZem CD (Cartia XT) 300 MG 24 hr capsule, Take 1 capsule by mouth Daily., Disp: 90 capsule, Rfl: 1    empagliflozin (Jardiance) 10 MG tablet tablet, Take 2.5 tablets by mouth Daily., Disp: 90 tablet, Rfl: 1    glipizide (GLUCOTROL XL) 10 MG 24 hr tablet, Take 10 mg daily in the morning, Disp: 90 tablet, Rfl: 1    losartan (COZAAR) 100 MG tablet, Take 1 tablet by mouth Daily., Disp: 90 tablet, Rfl: 1    metFORMIN (GLUCOPHAGE) 1000 MG tablet, Take 1 tablet by mouth 2 (Two) Times a Day With Meals., Disp: 180 tablet, Rfl: 3    pioglitazone (Actos) 15 MG tablet, Take 1 tablet by mouth Daily., Disp: 90 tablet, Rfl: 3    terazosin (HYTRIN) 5 MG capsule, Take 1 capsule by mouth Every Night., Disp: 90 capsule, Rfl: 1    venlafaxine XR (EFFEXOR-XR) 75 MG 24 hr capsule, Take 3 capsules by mouth Daily., Disp: 90 capsule, Rfl: 1     Patient denies the need for medication refills today.    OB History          4    Para   3    Term   3            AB        Living   3         SAB        IAB        Ectopic        Molar        Multiple        Live Births   3                Past Medical History:   Diagnosis Date    Depression     Diabetes mellitus     Herpes     Hypertension     IUD (intrauterine device) in place     due to menorrhagia    Stroke (cerebrum)         Past Surgical History:   Procedure Laterality Date    ADENOIDECTOMY      BACK SURGERY      disc slipped    CARDIAC ELECTROPHYSIOLOGY PROCEDURE N/A 2021    Procedure: Loop insertion- getting c19 elsewhere in [place] and aware to do on  and bring results;  Surgeon: Adonis Eng IV, MD;  Location: [facility] INVASIVE LOCATION;  Service: Cardiovascular;  Laterality: N/A;     SECTION      CHOLECYSTECTOMY      GALLBLADDER SURGERY      TONSILLECTOMY AND ADENOIDECTOMY      TUBAL ABDOMINAL LIGATION      WISDOM TOOTH  "EXTRACTION         Health Maintenance   Topic Date Due    Annual Gynecologic Pelvic and Breast Exam  Never done    COLORECTAL CANCER SCREENING  Never done    Hepatitis B (2 of 3 - 19+ 3-dose series) 03/15/2000    PAP SMEAR  Never done    Pneumococcal Vaccine 0-64 (2 - PCV) 08/05/2021    MAMMOGRAM  02/02/2023    ZOSTER VACCINE (2 of 2) 07/20/2023    INFLUENZA VACCINE  10/01/2023    HEMOGLOBIN A1C  01/12/2024    DIABETIC EYE EXAM  02/09/2024    URINE MICROALBUMIN  02/24/2024    ANNUAL PHYSICAL  04/12/2024    DIABETIC FOOT EXAM  07/12/2024    LIPID PANEL  07/12/2024    TDAP/TD VACCINES (3 - Td or Tdap) 02/26/2030    HEPATITIS C SCREENING  Completed    COVID-19 Vaccine  Completed       The additional following portions of the patient's history were reviewed and updated as appropriate: allergies, current medications, past family history, past medical history, past social history, past surgical history, and problem list.    Review of Systems   All other systems reviewed and are negative.      I have reviewed and agree with the HPI, ROS, and historical information as entered above. Pratibha Fitch, APRN          Objective   /80   Ht 162.6 cm (64.02\")   Wt 87.5 kg (192 lb 12.8 oz)   BMI 33.08 kg/m²     Physical Exam  Vitals and nursing note reviewed. Exam conducted with a chaperone present.   Constitutional:       General: She is not in acute distress.     Appearance: Normal appearance. She is well-developed. She is obese. She is not ill-appearing.   Neck:      Thyroid: No thyroid mass or thyromegaly.   Pulmonary:      Effort: Pulmonary effort is normal. No respiratory distress or retractions.   Chest:      Chest wall: No mass.   Breasts:     Right: Normal. No mass, nipple discharge, skin change or tenderness.      Left: Normal. No mass, nipple discharge, skin change or tenderness.   Abdominal:      General: There is no distension.      Palpations: Abdomen is soft. Abdomen is not rigid. There is no mass.      " Tenderness: There is no abdominal tenderness. There is no guarding or rebound.      Hernia: No hernia is present. There is no hernia in the left inguinal area.   Genitourinary:     General: Normal vulva.      Labia:         Right: No rash, tenderness or lesion.         Left: No rash, tenderness or lesion.       Vagina: Normal. No vaginal discharge or lesions.      Cervix: Normal.      Uterus: Normal. Not enlarged, not fixed and not tender.       Adnexa: Right adnexa normal and left adnexa normal.        Right: No mass or tenderness.          Left: No mass or tenderness.        Rectum: No external hemorrhoid.      Comments: IUD strings present; removed-- see procedure note  Musculoskeletal:      Cervical back: No muscular tenderness.   Skin:     General: Skin is warm and dry.   Neurological:      Mental Status: She is alert and oriented to person, place, and time.   Psychiatric:         Mood and Affect: Mood normal.         Behavior: Behavior normal.          Assessment and Plan    Problem List Items Addressed This Visit          Health Encounters    Annual physical exam - Primary    Relevant Orders    LIQUID-BASED PAP SMEAR WITH HPV GENOTYPING REGARDLESS OF INTERPRETATION (TAVO,COR,MAD)     Other Visit Diagnoses       Amenorrhea        Encounter for IUD removal        Breast cancer screening by mammogram        Relevant Orders    Mammo Screening Digital Tomosynthesis Bilateral With CAD            GYN annual well woman exam.   Pap guidelines reviewed.  Reviewed monthly self breast exams.  Instructed to call with lumps, pain, or breast discharge.    Ordered Mammogram today  Reviewed exercise as a preventative health measures.   Reccommended Flu Vaccine in Fall of each year.  Symptoms of menopausal transition reviewed with patient.   RTC in 1 year or PRN with problems.  Discussed importance of routine colon cancer screening. Reviewed current guidelines. Recommended colonoscopy after age 45.  Return in about 1 year  (around 7/28/2024) for Annual physical.     Pratibha Fitch, APRN  07/28/2023            Gynecologic Procedure Note        Procedure: IUD Removal     Procedures    Pre procedure indication     1) Desires Removal of IUD    Post procedure indication   1) Desires Removal of IUD    The patient presents today for removal of her IUD.  She requests removal of her IUD due to  possibly being postmenopausal and does not feel like she needs it anymore.   She plans to use no method for contraception. All her questions were answered and consents were signed.      The patient was placed in a dorsal lithotomy position on the examining table in [facility].  A speculum was inserted into the vagina and the cervix was brought into view.  An IUD string was visualized.  The string was then grasped and removed intact with gentle traction.  There was a Minimal amount of bleeding and cramping.    All  instruments were removed from the vagina.       The patient tolerated the procedure very well with a mild amount of discomfort.  She was monitored for 15 minutes prior to discharge.      There were no complications.    The patient was counseled on other options for birth control. She plans to use no method for contraception.     Problem List Items Addressed This Visit          Health Encounters    Annual physical exam - Primary    Relevant Orders    LIQUID-BASED PAP SMEAR WITH HPV GENOTYPING REGARDLESS OF INTERPRETATION (TAVO,COR,MAD)     Other Visit Diagnoses       Amenorrhea        Encounter for IUD removal        Breast cancer screening by mammogram        Relevant Orders    Mammo Screening Digital Tomosynthesis Bilateral With CAD              Pratibha Fitch, APRN  07/28/2023

## 2023-07-31 LAB — REF LAB TEST METHOD: NORMAL

## 2023-09-12 DIAGNOSIS — I10 ESSENTIAL HYPERTENSION: ICD-10-CM

## 2023-09-12 RX ORDER — LOSARTAN POTASSIUM 100 MG/1
TABLET ORAL
Qty: 90 TABLET | Refills: 0 | Status: SHIPPED | OUTPATIENT
Start: 2023-09-12

## 2023-09-12 NOTE — TELEPHONE ENCOUNTER
Rx Refill Note  Requested Prescriptions     Pending Prescriptions Disp Refills    losartan (COZAAR) 100 MG tablet [Pharmacy Med Name: Losartan Potassium 100 MG Oral Tablet] 90 tablet 0     Sig: Take 1 tablet by mouth once daily      Last office visit with prescribing clinician: 7/12/2023     Next office visit with prescribing clinician: 10/12/2023     Sayda Arizmendi MA  09/12/23, 16:34 EDT

## 2023-09-20 ENCOUNTER — TELEPHONE (OUTPATIENT)
Dept: CARDIOLOGY | Facility: CLINIC | Age: 58
End: 2023-09-20
Payer: COMMERCIAL

## 2023-09-20 NOTE — TELEPHONE ENCOUNTER
Called due to disconnected monitor.  Mrs Araya says she has it hooked up but it wanting her to go through the setup of monitor again.  This happened in July and she had to call [facility].  Reached out to [facility] and they will call her.

## 2023-10-02 ENCOUNTER — HOSPITAL ENCOUNTER (OUTPATIENT)
Dept: MAMMOGRAPHY | Facility: HOSPITAL | Age: 58
Discharge: HOME | End: 2023-10-02
Admitting: INTERNAL MEDICINE
Payer: COMMERCIAL

## 2023-10-02 DIAGNOSIS — Z12.31 ENCOUNTER FOR SCREENING MAMMOGRAM FOR MALIGNANT NEOPLASM OF BREAST: ICD-10-CM

## 2023-10-02 PROCEDURE — 77063 BREAST TOMOSYNTHESIS BI: CPT | Performed by: RADIOLOGY

## 2023-10-02 PROCEDURE — 77063 BREAST TOMOSYNTHESIS BI: CPT

## 2023-10-02 PROCEDURE — 77067 SCR MAMMO BI INCL CAD: CPT | Performed by: RADIOLOGY

## 2023-10-02 PROCEDURE — 77067 SCR MAMMO BI INCL CAD: CPT

## 2023-10-04 NOTE — PROGRESS NOTES
Please notify patient that the breast center will be notifying her to reschedule additional imagings. thanks

## 2023-10-05 ENCOUNTER — OFFICE VISIT (OUTPATIENT)
Dept: FAMILY MEDICINE CLINIC | Facility: CLINIC | Age: 58
End: 2023-10-05
Payer: COMMERCIAL

## 2023-10-05 VITALS
BODY MASS INDEX: 30.87 KG/M2 | HEART RATE: 74 BPM | HEIGHT: 64 IN | DIASTOLIC BLOOD PRESSURE: 82 MMHG | SYSTOLIC BLOOD PRESSURE: 124 MMHG | WEIGHT: 180.8 LBS | OXYGEN SATURATION: 97 %

## 2023-10-05 DIAGNOSIS — E78.5 DYSLIPIDEMIA: ICD-10-CM

## 2023-10-05 DIAGNOSIS — E11.65 UNCONTROLLED TYPE 2 DIABETES MELLITUS WITH HYPERGLYCEMIA: Primary | Chronic | ICD-10-CM

## 2023-10-05 DIAGNOSIS — Z23 NEEDS FLU SHOT: ICD-10-CM

## 2023-10-05 DIAGNOSIS — M25.562 ACUTE PAIN OF LEFT KNEE: ICD-10-CM

## 2023-10-05 DIAGNOSIS — I10 ESSENTIAL HYPERTENSION: ICD-10-CM

## 2023-10-05 LAB
EXPIRATION DATE: ABNORMAL
HBA1C MFR BLD: 7.1 %
Lab: ABNORMAL

## 2023-10-05 RX ORDER — CARVEDILOL 12.5 MG/1
12.5 TABLET ORAL 2 TIMES DAILY
Qty: 180 TABLET | Refills: 3 | Status: SHIPPED | OUTPATIENT
Start: 2023-10-05

## 2023-10-05 RX ORDER — SEMAGLUTIDE 0.68 MG/ML
0.5 INJECTION, SOLUTION SUBCUTANEOUS WEEKLY
Qty: 9 ML | Refills: 9 | Status: SHIPPED | OUTPATIENT
Start: 2023-10-05

## 2023-10-05 RX ORDER — PIOGLITAZONEHYDROCHLORIDE 15 MG/1
15 TABLET ORAL DAILY
Qty: 90 TABLET | Refills: 3 | Status: SHIPPED | OUTPATIENT
Start: 2023-10-05

## 2023-10-05 RX ORDER — DILTIAZEM HYDROCHLORIDE 300 MG/1
300 CAPSULE, COATED, EXTENDED RELEASE ORAL DAILY
Qty: 90 CAPSULE | Refills: 1 | Status: SHIPPED | OUTPATIENT
Start: 2023-10-05

## 2023-10-05 RX ORDER — TERAZOSIN 5 MG/1
5 CAPSULE ORAL NIGHTLY
Qty: 90 CAPSULE | Refills: 1 | Status: SHIPPED | OUTPATIENT
Start: 2023-10-05

## 2023-10-05 NOTE — ASSESSMENT & PLAN NOTE
Physical exam is reassuring today.  Of asked her to continue her soft brace for another 2 to 4 weeks and then try to come out of it.  Continue strengthening exercises.  His instability and clicking worsens or continues, I would like to refer to Ortho.

## 2023-10-05 NOTE — ASSESSMENT & PLAN NOTE
Diabetes is improving with treatment.   Continue current treatment regimen.  Reminded to bring in blood sugar diary at next visit.  Dietary recommendations for ADA diet.  Regular aerobic exercise.  Discussed foot care.  Reminded to get yearly retinal exam.  Diabetes will be reassessed in 3 months.  Continue her current oral regimen.  She is tolerating semaglutide 8.5 mg weekly.  She is effectively losing weight on this.  We will continue this current dose for the next 3 months prior to consider titrating

## 2023-10-05 NOTE — PROGRESS NOTES
"Rosanne Araya  1965  [id number]  Patient Care Team:  Neftali Mccain MD as PCP - General (Internal Medicine)  Gio Julian MD as Consulting Physician (Neurology)  Royal Lopez MD as Gynecologist (Gynecology)  Omer Hernandez MD as Consulting Physician (Dermatology)  Addie Mckeon PA-C as Physician Assistant (Endocrinology)  Marivel Becker APRN as Nurse Practitioner (Cardiology)    Rosanne Araya is a 57 y.o. female here today for follow up.     This patient is accompanied by their self who contributes to the history of their care.    Chief Complaint:    Chief Complaint   Patient presents with    Diabetes    Knee Pain     Ran over by a dog        History of Present Illness:  I have reviewed and/or updated the patient's past medical, past surgical, family, social history, problem list and allergies as appropriate.       Here for diabetes follow up. Has been non compliant with her diet. Denies pu/pd/ Current with opth. She walks 2 miles daily. Denies chest pain or SOA. She denies foot numbness or tingling. Balance is unstabel with bending forward ( worse since her stroke. Nilson lightheadeness. She has to contiue to look at her feet when walking -she has not fallen. At last visit: Increased Jardiance to 25 mg, add Actos 15 mg daily continue Glucotrol as well as metformin. She is intolerant of GLP-1 agonist. \"If A1c does not improve at next visit we will likely introduce insulin or endocrine referral\"    She tried ozempic and has been tolerating this with mild waves of nausea. She has lost 5 lbs over past 4 weeks     Bp has been running better ( except at colonoscopy was canceled.).  Any chest pain shortness of breath orthopnea PND.     Continues to take Lipitor 80 mg daily.  Denies any muscle aches or dark-colored urine.    Has been wearing a knee brace since 9/5 after a lateral blow to left knee by an 80lb dog. It gives out and clicks; however this is improved. She did strenghtening " "exercise with bands. Uses advil prn  Review of Systems   Constitutional:  Positive for unexpected weight loss. Negative for chills, fatigue, fever and unexpected weight gain.   HENT:  Negative for ear pain, postnasal drip, sinus pressure and sore throat.    Eyes:  Negative for blurred vision, double vision and visual disturbance.   Respiratory:  Negative for cough, shortness of breath and wheezing.    Cardiovascular:  Negative for chest pain, palpitations and leg swelling.   Gastrointestinal:  Negative for abdominal pain, blood in stool, diarrhea, nausea and vomiting.   Endocrine: Negative for cold intolerance, heat intolerance, polydipsia, polyphagia and polyuria.   Genitourinary:  Negative for dysuria, flank pain and hematuria.   Musculoskeletal:  Negative for arthralgias and joint swelling.        Knee pain, left knee clicking   Skin:  Negative for dry skin and rash.   Neurological:  Negative for weakness, numbness and headache.   Psychiatric/Behavioral:  Negative for self-injury, suicidal ideas and depressed mood.      Vitals:    10/05/23 1239   BP: 124/82   Pulse: 74   SpO2: 97%   Weight: 82 kg (180 lb 12.8 oz)   Height: 162.6 cm (64.02\")     Body mass index is 31.02 kg/m².    Physical Exam  Vitals and nursing note reviewed.   Constitutional:       General: She is not in acute distress.     Appearance: She is well-developed. She is not diaphoretic.   HENT:      Head: Normocephalic and atraumatic.      Right Ear: External ear normal.      Left Ear: External ear normal.      Mouth/Throat:      Pharynx: No oropharyngeal exudate.   Eyes:      General: No scleral icterus.        Right eye: No discharge.      Conjunctiva/sclera: Conjunctivae normal.   Neck:      Thyroid: No thyromegaly.      Vascular: No JVD.      Trachea: No tracheal deviation.   Cardiovascular:      Rate and Rhythm: Normal rate and regular rhythm.      Heart sounds: Normal heart sounds.      Comments: PMI nondisplaced  Pulmonary:      Effort: " Pulmonary effort is normal.      Breath sounds: Normal breath sounds. No wheezing or rales.   Abdominal:      General: Bowel sounds are normal.      Palpations: Abdomen is soft.      Tenderness: There is no abdominal tenderness. There is no guarding or rebound.   Musculoskeletal:         General: Tenderness present. No deformity or signs of injury.      Cervical back: Normal range of motion and neck supple.      Right lower leg: No edema.      Left lower leg: No edema.      Comments: Maeve's sign negative.  I cannot appreciate any effusion.  Range of motion is full.  She does have some medial joint line tenderness on the left knee.  No drawer sign   Lymphadenopathy:      Cervical: No cervical adenopathy.   Skin:     General: Skin is warm and dry.      Capillary Refill: Capillary refill takes less than 2 seconds.      Coloration: Skin is not pale.      Findings: No rash.   Neurological:      Mental Status: She is alert and oriented to person, place, and time.      Motor: No abnormal muscle tone.      Coordination: Coordination normal.   Psychiatric:         Mood and Affect: Mood normal.         Behavior: Behavior normal.         Judgment: Judgment normal.       Procedures    Results Review:    I reviewed the patient's new clinical results.  A1c    Assessment/Plan:    Problem List Items Addressed This Visit          Cardiac and Vasculature    Essential hypertension    Current Assessment & Plan     Hypertension is improving with treatment.  Continue current treatment regimen.  Dietary sodium restriction.  Weight loss.  Regular aerobic exercise.  Continue current medications.  Blood pressure will be reassessed at the next regular appointment.         Relevant Medications    losartan (COZAAR) 100 MG tablet    terazosin (HYTRIN) 5 MG capsule    dilTIAZem CD (Cartia XT) 300 MG 24 hr capsule    carvedilol (COREG) 12.5 MG tablet    Dyslipidemia    Current Assessment & Plan     Stable on intensive statin therapy with  Lipitor 80 mg daily.  No side effects noted            Endocrine and Metabolic    Uncontrolled type 2 diabetes mellitus with hyperglycemia - Primary (Chronic)    Current Assessment & Plan     Diabetes is improving with treatment.   Continue current treatment regimen.  Reminded to bring in blood sugar diary at next visit.  Dietary recommendations for ADA diet.  Regular aerobic exercise.  Discussed foot care.  Reminded to get yearly retinal exam.  Diabetes will be reassessed in 3 months.  Continue her current oral regimen.  She is tolerating semaglutide 8.5 mg weekly.  She is effectively losing weight on this.  We will continue this current dose for the next 3 months prior to consider titrating         Relevant Medications    glipizide (GLUCOTROL XL) 10 MG 24 hr tablet    empagliflozin (Jardiance) 10 MG tablet tablet    pioglitazone (Actos) 15 MG tablet    metFORMIN (GLUCOPHAGE) 1000 MG tablet    Semaglutide,0.25 or 0.5MG/DOS, (Ozempic, 0.25 or 0.5 MG/DOSE,) 2 MG/3ML solution pen-injector    Other Relevant Orders    POC Glycosylated Hemoglobin (Hb A1C) (Completed)       Musculoskeletal and Injuries    Acute pain of left knee    Current Assessment & Plan     Physical exam is reassuring today.  Of asked her to continue her soft brace for another 2 to 4 weeks and then try to come out of it.  Continue strengthening exercises.  His instability and clicking worsens or continues, I would like to refer to Ortho.          Other Visit Diagnoses       Needs flu shot                 Plan of care reviewed with patient at the conclusion of today's visit. Education was provided regarding diagnosis and management.  Patient verbalizes understanding of and agreement with management plan.    Return in about 3 months (around 1/5/2024) for dm.    Neftali Mccain MD      Please note than portions of this note were completed [facility] a Voice Recognition Program

## 2023-11-06 ENCOUNTER — HOSPITAL ENCOUNTER (OUTPATIENT)
Dept: MAMMOGRAPHY | Facility: HOSPITAL | Age: 58
Discharge: HOME | End: 2023-11-06
Admitting: RADIOLOGY
Payer: COMMERCIAL

## 2023-11-06 ENCOUNTER — HOSPITAL ENCOUNTER (OUTPATIENT)
Dept: ULTRASOUND IMAGING | Facility: HOSPITAL | Age: 58
Discharge: HOME | End: 2023-11-06
Payer: COMMERCIAL

## 2023-11-06 DIAGNOSIS — R92.8 ABNORMAL MAMMOGRAM: ICD-10-CM

## 2023-11-06 PROCEDURE — G0279 TOMOSYNTHESIS, MAMMO: HCPCS

## 2023-11-06 PROCEDURE — 76642 ULTRASOUND BREAST LIMITED: CPT

## 2023-11-06 PROCEDURE — 76642 ULTRASOUND BREAST LIMITED: CPT | Performed by: RADIOLOGY

## 2023-11-06 PROCEDURE — 77061 BREAST TOMOSYNTHESIS UNI: CPT | Performed by: RADIOLOGY

## 2023-11-06 PROCEDURE — 77065 DX MAMMO INCL CAD UNI: CPT

## 2023-11-06 PROCEDURE — 77065 DX MAMMO INCL CAD UNI: CPT | Performed by: RADIOLOGY

## 2023-11-10 ENCOUNTER — TELEPHONE (OUTPATIENT)
Dept: CARDIOLOGY | Facility: CLINIC | Age: 58
End: 2023-11-10
Payer: COMMERCIAL

## 2023-11-18 DIAGNOSIS — I63.9 ACUTE CVA (CEREBROVASCULAR ACCIDENT): ICD-10-CM

## 2023-11-21 RX ORDER — CLOPIDOGREL BISULFATE 75 MG/1
75 TABLET ORAL DAILY
Qty: 90 TABLET | Refills: 0 | Status: SHIPPED | OUTPATIENT
Start: 2023-11-21

## 2023-11-21 NOTE — TELEPHONE ENCOUNTER
Rx Refill Note  Requested Prescriptions     Pending Prescriptions Disp Refills    clopidogrel (PLAVIX) 75 MG tablet [Pharmacy Med Name: Clopidogrel Bisulfate 75 MG Oral Tablet] 90 tablet 0     Sig: Take 1 tablet by mouth once daily      Last office visit with prescribing clinician: 10/5/2023   Last telemedicine visit with prescribing clinician: Visit date not found   Next office visit with prescribing clinician: 11/20/2023                         Would you like a call back once the refill request has been completed: [] Yes [] No    If the office needs to give you a call back, can they leave a voicemail: [] Yes [] No    Ana Ling MA  11/21/23, 08:05 EST

## 2023-11-29 RX ORDER — SODIUM PICOSULFATE, MAGNESIUM OXIDE, AND ANHYDROUS CITRIC ACID 12; 3.5; 1 G/175ML; G/175ML; MG/175ML
350 LIQUID ORAL ONCE
Qty: 350 ML | Refills: 0 | Status: SHIPPED | OUTPATIENT
Start: 2023-11-29 | End: 2023-11-29

## 2023-12-03 DIAGNOSIS — I10 ESSENTIAL HYPERTENSION: ICD-10-CM

## 2023-12-04 RX ORDER — LOSARTAN POTASSIUM 100 MG/1
TABLET ORAL
Qty: 90 TABLET | Refills: 0 | Status: SHIPPED | OUTPATIENT
Start: 2023-12-04

## 2023-12-06 ENCOUNTER — TELEPHONE (OUTPATIENT)
Dept: GASTROENTEROLOGY | Facility: CLINIC | Age: 58
End: 2023-12-06
Payer: COMMERCIAL

## 2023-12-06 NOTE — TELEPHONE ENCOUNTER
Spoke with pt and went over prep time and insrtrx. Reassured pt okay to stay on Plavix due to being a Dr Diaz pt. Pt verbalized understanding.

## 2023-12-07 ENCOUNTER — OUTSIDE FACILITY SERVICE (OUTPATIENT)
Dept: GASTROENTEROLOGY | Facility: CLINIC | Age: 58
End: 2023-12-07
Payer: COMMERCIAL

## 2023-12-07 PROCEDURE — 88305 TISSUE EXAM BY PATHOLOGIST: CPT

## 2023-12-08 ENCOUNTER — LAB REQUISITION (OUTPATIENT)
Dept: LAB | Facility: HOSPITAL | Age: 58
End: 2023-12-08
Payer: COMMERCIAL

## 2023-12-08 DIAGNOSIS — Z12.11 ENCOUNTER FOR SCREENING FOR MALIGNANT NEOPLASM OF COLON: ICD-10-CM

## 2023-12-11 LAB — REF LAB TEST METHOD: NORMAL

## 2024-01-08 ENCOUNTER — OFFICE VISIT (OUTPATIENT)
Dept: FAMILY MEDICINE CLINIC | Facility: CLINIC | Age: 59
End: 2024-01-08
Payer: COMMERCIAL

## 2024-01-08 VITALS
BODY MASS INDEX: 28.95 KG/M2 | HEART RATE: 79 BPM | OXYGEN SATURATION: 97 % | SYSTOLIC BLOOD PRESSURE: 142 MMHG | WEIGHT: 169.6 LBS | DIASTOLIC BLOOD PRESSURE: 86 MMHG | HEIGHT: 64 IN

## 2024-01-08 DIAGNOSIS — E11.65 UNCONTROLLED TYPE 2 DIABETES MELLITUS WITH HYPERGLYCEMIA: Primary | ICD-10-CM

## 2024-01-08 DIAGNOSIS — I63.9 ACUTE CVA (CEREBROVASCULAR ACCIDENT): ICD-10-CM

## 2024-01-08 DIAGNOSIS — E78.5 DYSLIPIDEMIA: ICD-10-CM

## 2024-01-08 DIAGNOSIS — I10 ESSENTIAL HYPERTENSION: ICD-10-CM

## 2024-01-08 LAB
EXPIRATION DATE: ABNORMAL
HBA1C MFR BLD: 6.9 % (ref 4.5–5.7)
Lab: ABNORMAL

## 2024-01-08 PROCEDURE — 99214 OFFICE O/P EST MOD 30 MIN: CPT | Performed by: INTERNAL MEDICINE

## 2024-01-08 PROCEDURE — 82043 UR ALBUMIN QUANTITATIVE: CPT | Performed by: INTERNAL MEDICINE

## 2024-01-08 PROCEDURE — 83036 HEMOGLOBIN GLYCOSYLATED A1C: CPT | Performed by: INTERNAL MEDICINE

## 2024-01-08 PROCEDURE — 90471 IMMUNIZATION ADMIN: CPT | Performed by: INTERNAL MEDICINE

## 2024-01-08 PROCEDURE — 82570 ASSAY OF URINE CREATININE: CPT | Performed by: INTERNAL MEDICINE

## 2024-01-08 PROCEDURE — 90677 PCV20 VACCINE IM: CPT | Performed by: INTERNAL MEDICINE

## 2024-01-08 RX ORDER — SEMAGLUTIDE 1.34 MG/ML
1 INJECTION, SOLUTION SUBCUTANEOUS WEEKLY
Qty: 3 ML | Refills: 9 | Status: SHIPPED | OUTPATIENT
Start: 2024-01-08

## 2024-01-08 RX ORDER — CLOPIDOGREL BISULFATE 75 MG/1
75 TABLET ORAL DAILY
Qty: 90 TABLET | Refills: 0 | Status: SHIPPED | OUTPATIENT
Start: 2024-01-08

## 2024-01-08 RX ORDER — LOSARTAN POTASSIUM 100 MG/1
100 TABLET ORAL DAILY
Qty: 90 TABLET | Refills: 3 | Status: SHIPPED | OUTPATIENT
Start: 2024-01-08

## 2024-01-08 NOTE — ASSESSMENT & PLAN NOTE
Able and improved and tolerating high-dose Lipitor 80 mg daily.  Continue, fasting lipid profile next summer

## 2024-01-08 NOTE — PROGRESS NOTES
Rosanne Araya  1965  [id number]  Patient Care Team:  Neftali Mccain MD as PCP - General (Internal Medicine)  Gio Julian MD as Consulting Physician (Neurology)  Royal Lopez MD as Gynecologist (Gynecology)  Omer Hernandez MD as Consulting Physician (Dermatology)  Addie Mckeon PA-C as Physician Assistant (Endocrinology)  Marivel Becker APRN as Nurse Practitioner (Cardiology)    Rosanne Araya is a 58 y.o. female here today for follow up.     This patient is accompanied by their self who contributes to the history of their care.    Chief Complaint:    Chief Complaint   Patient presents with    Diabetes        History of Present Illness:  I have reviewed and/or updated the patient's past medical, past surgical, family, social history, problem list and allergies as appropriate.     She is off glipizide. Tolerating 0.5 mg ozempic. Had diarrhea initially. She likes the results. Successful weight loss. No hypoglycemia. Current with ortho.  She denies poly uria or dipsia.  She does check bp at home in normal range. Occasional orthostatic changes noted with weight loss. She has a difficult time getting her fluid in since being o ozempic.  She continues on Lipitor intensive dosing at 80 mg daily without any muscle aches or dark-colored urine.  Cholesterol is at goal last summer.    Review of Systems   Constitutional:  Negative for chills, fatigue, fever, unexpected weight gain and unexpected weight loss.   HENT:  Negative for ear pain, postnasal drip, sinus pressure and sore throat.    Eyes:  Negative for blurred vision.   Respiratory:  Negative for cough, shortness of breath and wheezing.    Cardiovascular:  Negative for chest pain, palpitations and leg swelling.   Gastrointestinal:  Negative for abdominal pain, blood in stool, diarrhea, nausea and vomiting.   Endocrine: Negative for cold intolerance, heat intolerance, polydipsia and polyuria.   Genitourinary:  Negative for dysuria, flank  "pain and hematuria.   Musculoskeletal:  Negative for arthralgias and joint swelling.   Neurological:  Negative for weakness, numbness and headache.       Vitals:    01/08/24 1346   BP: 142/86   Pulse: 79   SpO2: 97%   Weight: 76.9 kg (169 lb 9.6 oz)   Height: 162.6 cm (64.02\")     Body mass index is 29.1 kg/m².    Physical Exam  Vitals and nursing note reviewed.   Constitutional:       General: She is not in acute distress.     Appearance: She is well-developed. She is not diaphoretic.   HENT:      Head: Normocephalic and atraumatic.      Right Ear: External ear normal.      Left Ear: External ear normal.      Mouth/Throat:      Pharynx: No oropharyngeal exudate.   Eyes:      General: No scleral icterus.        Right eye: No discharge.      Conjunctiva/sclera: Conjunctivae normal.   Neck:      Thyroid: No thyromegaly.      Vascular: No JVD.      Trachea: No tracheal deviation.   Cardiovascular:      Rate and Rhythm: Normal rate and regular rhythm.      Heart sounds: Normal heart sounds.      Comments: PMI nondisplaced  Pulmonary:      Effort: Pulmonary effort is normal.      Breath sounds: Normal breath sounds. No wheezing or rales.   Abdominal:      General: Bowel sounds are normal.      Palpations: Abdomen is soft.      Tenderness: There is no abdominal tenderness. There is no guarding or rebound.   Musculoskeletal:      Cervical back: Normal range of motion and neck supple.   Lymphadenopathy:      Cervical: No cervical adenopathy.   Skin:     General: Skin is warm and dry.      Capillary Refill: Capillary refill takes less than 2 seconds.      Coloration: Skin is not pale.      Findings: No rash.   Neurological:      Mental Status: She is alert and oriented to person, place, and time.      Motor: No abnormal muscle tone.      Coordination: Coordination normal.   Psychiatric:         Judgment: Judgment normal.         Procedures    Results Review:    I reviewed the patient's new clinical results.  A1c is improved " at 6.9%.  Urine microalbumin currently pending    Assessment/Plan:    Problem List Items Addressed This Visit       Uncontrolled type 2 diabetes mellitus with hyperglycemia - Primary (Chronic)    Current Assessment & Plan     Diabetes is improving with treatment.   Reminded to bring in blood sugar diary at next visit.  Dietary recommendations for ADA diet.  Regular aerobic exercise.  Discussed ways to avoid symptomatic hypoglycemia.  Discussed foot care.  Reminded to get yearly retinal exam.  Medication changes per orders.  Diabetes will be reassessed in 3 months.         Relevant Medications    empagliflozin (Jardiance) 10 MG tablet tablet    pioglitazone (Actos) 15 MG tablet    metFORMIN (GLUCOPHAGE) 1000 MG tablet    Semaglutide,0.25 or 0.5MG/DOS, (Ozempic, 0.25 or 0.5 MG/DOSE,) 2 MG/3ML solution pen-injector    Semaglutide, 1 MG/DOSE, (Ozempic, 1 MG/DOSE,) 4 MG/3ML solution pen-injector    Other Relevant Orders    POC Glycosylated Hemoglobin (Hb A1C) (Completed)    Microalbumin / Creatinine Urine Ratio - Urine, Clean Catch    Essential hypertension    Current Assessment & Plan     Hypertension is improving with treatment.  Continue current treatment regimen.  Dietary sodium restriction.  Weight loss.  Regular aerobic exercise.  Continue current medications.  Ambulatory blood pressure monitoring.  Blood pressure will be reassessed in 3 months.         Relevant Medications    terazosin (HYTRIN) 5 MG capsule    dilTIAZem CD (Cartia XT) 300 MG 24 hr capsule    carvedilol (COREG) 12.5 MG tablet    losartan (COZAAR) 100 MG tablet    Dyslipidemia    Current Assessment & Plan     Able and improved and tolerating high-dose Lipitor 80 mg daily.  Continue, fasting lipid profile next summer          Other Visit Diagnoses       Acute CVA (cerebrovascular accident)        Relevant Medications    clopidogrel (PLAVIX) 75 MG tablet            Plan of care reviewed with patient at the conclusion of today's visit. Education was  provided regarding diagnosis and management.  Patient verbalizes understanding of and agreement with management plan.    Return in about 3 months (around 4/8/2024) for Diabetes, lipids, Ozempic, hypertension.    Neftali Mccain MD      Please note than portions of this note were completed wth a Voice Recognition Program

## 2024-01-08 NOTE — LETTER
[facility]  Vaccine Consent Form    Patient Name:  Rosanne Araya  Patient :  1965     Vaccine(s) Ordered    Pneumococcal Conjugate Vaccine 20-Valent (PCV20)        Screening Checklist  The following questions should be completed prior to vaccination. If you answer “yes” to any question, it does not necessarily mean you should not be vaccinated. It just means we may need to clarify or ask more questions. If a question is unclear, please ask your healthcare provider to explain it.    Yes No   Any fever or moderate to severe illness today (mild illness and/or antibiotic treatment are not contraindications)?     Do you have a history of a serious reaction to any previous vaccinations, such as anaphylaxis, encephalopathy within 7 days, Guillain-Dallas syndrome within 6 weeks, seizure?     Have you received any live vaccine(s) (e.g MMR, ENIO) or any other vaccines in the last month (to ensure duplicate doses aren't given)?     Do you have an anaphylactic allergy to latex (DTaP, DTaP-IPV, Hep A, Hep B, MenB, RV, Td, Tdap), baker’s yeast (Hep B, HPV), polysorbates (RSV, nirsevimab, PCV 20, Rotavirrus, Tdap, Shingrix), or gelatin (ENIO, MMR)?     Do you have an anaphylactic allergy to neomycin (Rabies, ENIO, MMR, IPV, Hep A), polymyxin B (IPV), or streptomycin (IPV)?      Any cancer, leukemia, AIDS, or other immune system disorder? (ENIO, MMR, RV)     Do you have a parent, brother, or sister with an immune system problem (if immune competence of vaccine recipient clinically verified, can proceed)? (MMR, ENIO)     Any recent steroid treatments for >2 weeks, chemotherapy, or radiation treatment? (ENIO, MMR)     Have you received antibody-containing blood transfusions or IVIG in the past 11 months (recommended interval is dependent on product)? (MMR, ENIO)     Have you taken antiviral drugs (acyclovir, famciclovir, valacyclovir for ENIO) in the last 24 or 48 hours, respectively?      Are you pregnant or planning to  "become pregnant within 1 month? (ENIO, MMR, HPV, IPV, MenB, Abrexvy; For Hep B- refer to Engerix-B; For RSV - Abrysvo is indicated for 32-36 weeks of pregnancy from September to January)     For infants, have you ever been told your child has had intussusception or a medical emergency involving obstruction of the intestine (Rotavirus)? If not for an infant, can skip this question.         *Ordering Physicians/APC should be consulted if \"yes\" is checked by the patient or guardian above.  I have received, read, and understand the Vaccine Information Statement (VIS) for each vaccine ordered.  I have considered my or my child's health status as well as the health status of my close contacts.  I have taken the opportunity to discuss my vaccine questions with my or my child's health care provider.   I have requested that the ordered vaccine(s) be given to me or my child.  I understand the benefits and risks of the vaccines.  I understand that I should remain in the clinic for 15 minutes after receiving the vaccine(s).  _________________________________________________________  Signature of Patient or Parent/Legal Guardian ____________________  Date     "

## 2024-01-09 LAB
ALBUMIN UR-MCNC: 20.3 MG/DL
CREAT UR-MCNC: 207.8 MG/DL
MICROALBUMIN/CREAT UR: 97.7 MG/G (ref 0–29)

## 2024-01-09 NOTE — PROGRESS NOTES
Mircoalbumin level is mildly increased @ 97. <30 normal..( is.microalbumiuria). Renal function.has been normal. This just signifies early kidney dz. Avoid nsaids, tight sugar control  and BP control are important. Will monitor

## 2024-01-27 DIAGNOSIS — E11.65 UNCONTROLLED TYPE 2 DIABETES MELLITUS WITH HYPERGLYCEMIA: Chronic | ICD-10-CM

## 2024-01-27 RX ORDER — EMPAGLIFLOZIN 10 MG/1
TABLET, FILM COATED ORAL
Qty: 90 TABLET | Refills: 0 | Status: SHIPPED | OUTPATIENT
Start: 2024-01-27

## 2024-02-14 ENCOUNTER — TELEPHONE (OUTPATIENT)
Dept: CARDIOLOGY | Facility: CLINIC | Age: 59
End: 2024-02-14
Payer: COMMERCIAL

## 2024-04-02 ENCOUNTER — OFFICE VISIT (OUTPATIENT)
Dept: FAMILY MEDICINE CLINIC | Facility: CLINIC | Age: 59
End: 2024-04-02
Payer: COMMERCIAL

## 2024-04-02 VITALS
HEIGHT: 64 IN | BODY MASS INDEX: 28.2 KG/M2 | OXYGEN SATURATION: 96 % | HEART RATE: 78 BPM | DIASTOLIC BLOOD PRESSURE: 78 MMHG | WEIGHT: 165.2 LBS | SYSTOLIC BLOOD PRESSURE: 126 MMHG

## 2024-04-02 DIAGNOSIS — E78.5 DYSLIPIDEMIA: ICD-10-CM

## 2024-04-02 DIAGNOSIS — E66.3 OVERWEIGHT (BMI 25.0-29.9): ICD-10-CM

## 2024-04-02 DIAGNOSIS — I10 ESSENTIAL HYPERTENSION: ICD-10-CM

## 2024-04-02 DIAGNOSIS — E11.29 TYPE 2 DIABETES MELLITUS WITH DIABETIC MICROALBUMINURIA, WITHOUT LONG-TERM CURRENT USE OF INSULIN: Primary | ICD-10-CM

## 2024-04-02 DIAGNOSIS — R80.9 TYPE 2 DIABETES MELLITUS WITH DIABETIC MICROALBUMINURIA, WITHOUT LONG-TERM CURRENT USE OF INSULIN: Primary | ICD-10-CM

## 2024-04-02 PROBLEM — E66.9 OBESITY (BMI 30-39.9): Status: RESOLVED | Noted: 2023-07-12 | Resolved: 2024-04-02

## 2024-04-02 LAB
EXPIRATION DATE: ABNORMAL
HBA1C MFR BLD: 7.1 % (ref 4.5–5.7)
Lab: ABNORMAL

## 2024-04-02 RX ORDER — ONDANSETRON 4 MG/1
4 TABLET, FILM COATED ORAL EVERY 8 HOURS PRN
Qty: 60 TABLET | Refills: 1 | Status: SHIPPED | OUTPATIENT
Start: 2024-04-02

## 2024-04-02 NOTE — PROGRESS NOTES
Rosanne Araya  1965  [id number]  Patient Care Team:  Neftali Mccain MD as PCP - General (Internal Medicine)  Gio Julian MD as Consulting Physician (Neurology)  Royal Lopez MD as Gynecologist (Gynecology)  Omer Hernandez MD as Consulting Physician (Dermatology)  Addie Mckeon PA-C as Physician Assistant (Endocrinology)  Marivel Becker APRN as Nurse Practitioner (Cardiology)    Rosanne Araya is a 58 y.o. female here today for follow up.     This patient is accompanied by their self who contributes to the history of their care.    Chief Complaint:    Chief Complaint   Patient presents with    Hypertension    Diabetes        History of Present Illness:  I have reviewed and/or updated the patient's past medical, past surgical, family, social history, problem list and allergies as appropriate.     Here for diabetes/HTN/lipid follow up.  Has been back on Ozempic at 1 mg weekly remains off glipizided.  She walks 2 miles daily She is off glipizide.  Had diarrhea initially. She likes the results. Successful weight loss coniutes . No hypoglycemia. Current with optho  She denies poly uria or dipsia.  She does check bp at home in normal range. Occasional orthostatic changes noted with weight loss. She has a difficult time getting her fluid in since being o ozempic.  She continues on Lipitor intensive dosing at 80 mg daily without any muscle aches or dark-colored urine.  Cholesterol is at goal last summer.  Have a positive microalbumin at last visit.  Patient is on Cozaar.  Pressures have been uncontrolled at home. She had lost her Jardiance ( ? Stolen). Has been off 3 weeks.   She was able to stop terazosin secondary to low bp. Her orthostatic resolved with cessation. Remains on plavix for cva prophylaxis    Review of Systems   Constitutional:  Negative for unexpected weight loss.   Eyes:  Negative for blurred vision.   Endocrine: Negative for polydipsia and polyuria.   Neurological:  Negative  "for tremors, speech difficulty and confusion.       Vitals:    04/02/24 0852   BP: 126/78   Pulse: 78   SpO2: 96%   Weight: 74.9 kg (165 lb 3.2 oz)   Height: 162.6 cm (64.02\")     Body mass index is 28.34 kg/m².    Physical Exam    Procedures    Results Review: A1c stable at 7.1    I reviewed the patient's new clinical results.           Component  Ref Range & Units 8 mo ago  (7/12/23) 2 yr ago  (4/19/21) 3 yr ago  (3/12/21) 3 yr ago  (1/19/21) 3 yr ago  (11/27/20) 3 yr ago  (10/26/20)   Total Cholesterol  0 - 200 mg/dL 130 109 120 112 122 234 High    Triglycerides  0 - 150 mg/dL 106 75 72 142 134 209 High    HDL Cholesterol  40 - 60 mg/dL 45 43 37 Low  31 Low  29 Low  37 Low    LDL Cholesterol  0 - 100 mg/dL 65 51 68 56 69 158 High    VLDL Cholesterol  5 - 40 mg/dL 20          Assessment/Plan:    Problem List Items Addressed This Visit       Type 2 diabetes mellitus with diabetic microalbuminuria, without long-term current use of insulin - Primary    Current Assessment & Plan     Diabetes is improving with treatment.   Medication changes per orders.  Reminded to bring in blood sugar diary at next visit.  Recommended an ADA diet.  Recommended a Mediterranean style of eating  Discussed ways to avoid symptomatic hypoglycemia.  Discussed foot care.  Reminded to get yearly retinal exam.  Diabetes will be reassessed in 3 months.  She will continue Ozempic at 1 mg.  We have prescribed ondansetron for couple days per week for nauseousness.  If she chooses to increase to 2 mg weekly she will let me know (of Ozempic) we will resume Jardiance however have prescribed a lower dose at 10 mg daily.  She will continue pioglitazone 15 mg daily and metformin at 1 g p.o. twice daily.  Goal being on minimal oral medications.  She will continue efforts at diet and exercise kudos to her.         Relevant Medications    pioglitazone (Actos) 15 MG tablet    metFORMIN (GLUCOPHAGE) 1000 MG tablet    Semaglutide,0.25 or 0.5MG/DOS, (Ozempic, " 0.25 or 0.5 MG/DOSE,) 2 MG/3ML solution pen-injector    Semaglutide, 1 MG/DOSE, (Ozempic, 1 MG/DOSE,) 4 MG/3ML solution pen-injector    empagliflozin (Jardiance) 10 MG tablet tablet    Other Relevant Orders    POC Glycosylated Hemoglobin (Hb A1C) (Completed)    Essential hypertension    Current Assessment & Plan     Hypertension is stable and controlled  Continue current treatment regimen.  Medication changes per orders.  Dietary sodium restriction.  Weight loss.  Regular aerobic exercise.  Ambulatory blood pressure monitoring.  Blood pressure will be reassessed in 3 months.  Continue Cardizem, carvedilol, losartan.  Discontinue terazosin.         Relevant Medications    dilTIAZem CD (Cartia XT) 300 MG 24 hr capsule    carvedilol (COREG) 12.5 MG tablet    losartan (COZAAR) 100 MG tablet    Dyslipidemia    Current Assessment & Plan     Repeat fasting lipid profile at follow-up in July annual.  Continues on intensive dose Lipitor.         Overweight (BMI 25.0-29.9)    Current Assessment & Plan     Patient's (Body mass index is 28.34 kg/m².) indicates that they are overweight with health conditions that include hypertension, diabetes mellitus, and dyslipidemias . Weight is improving with treatment. BMI is is above average; BMI management plan is completed. We discussed low calorie, low carb based diet program, portion control, increasing exercise, joining a fitness center or start home based exercise program, and pharmacologic options including continuing Ozempic for diabetes .             Plan of care reviewed with patient at the conclusion of today's visit. Education was provided regarding diagnosis and management.  Patient verbalizes understanding of and agreement with management plan.    Return in about 3 months (around 7/2/2024) for Annual fasting blood work.    Neftali Mccain MD      Please note than portions of this note were completed wt a Voice Recognition Program          Answers submitted by the patient  for this visit:  Primary Reason for Visit (Submitted on 4/1/2024)  What is the primary reason for your visit?: Diabetes  Diabetes Questionnaire (Submitted on 4/1/2024)  Chief Complaint: Diabetes problem  Diabetes type: type 2  MedicAlert ID: No  Disease duration: 21 Years  Treatment compliance: some of the time  foot paresthesias: No  foot ulcerations: No  headaches: No  sweats: No  Current diet: diabetic  Meal planning: low carbohydrate diet  Exercise: daily  Eye exam current: Yes  Sees podiatrist: No  Additional information: F/u ozempic

## 2024-04-02 NOTE — ASSESSMENT & PLAN NOTE
Hypertension is stable and controlled  Continue current treatment regimen.  Medication changes per orders.  Dietary sodium restriction.  Weight loss.  Regular aerobic exercise.  Ambulatory blood pressure monitoring.  Blood pressure will be reassessed in 3 months.  Continue Cardizem, carvedilol, losartan.  Discontinue terazosin.

## 2024-04-02 NOTE — ASSESSMENT & PLAN NOTE
Diabetes is improving with treatment.   Medication changes per orders.  Reminded to bring in blood sugar diary at next visit.  Recommended an ADA diet.  Recommended a Mediterranean style of eating  Discussed ways to avoid symptomatic hypoglycemia.  Discussed foot care.  Reminded to get yearly retinal exam.  Diabetes will be reassessed in 3 months.  She will continue Ozempic at 1 mg.  We have prescribed ondansetron for couple days per week for nauseousness.  If she chooses to increase to 2 mg weekly she will let me know (of Ozempic) we will resume Jardiance however have prescribed a lower dose at 10 mg daily.  She will continue pioglitazone 15 mg daily and metformin at 1 g p.o. twice daily.  Goal being on minimal oral medications.  She will continue efforts at diet and exercise kudos to her.

## 2024-04-02 NOTE — ASSESSMENT & PLAN NOTE
Patient's (Body mass index is 28.34 kg/m².) indicates that they are overweight with health conditions that include hypertension, diabetes mellitus, and dyslipidemias . Weight is improving with treatment. BMI is is above average; BMI management plan is completed. We discussed low calorie, low carb based diet program, portion control, increasing exercise, joining a fitness center or start home based exercise program, and pharmacologic options including continuing Ozempic for diabetes .

## 2024-04-12 RX ORDER — DILTIAZEM HYDROCHLORIDE 300 MG/1
300 CAPSULE, COATED, EXTENDED RELEASE ORAL DAILY
Qty: 90 CAPSULE | Refills: 0 | Status: SHIPPED | OUTPATIENT
Start: 2024-04-12

## 2024-05-16 DIAGNOSIS — I63.9 ACUTE CVA (CEREBROVASCULAR ACCIDENT): ICD-10-CM

## 2024-05-16 RX ORDER — CLOPIDOGREL BISULFATE 75 MG/1
75 TABLET ORAL DAILY
Qty: 90 TABLET | Refills: 0 | Status: SHIPPED | OUTPATIENT
Start: 2024-05-16

## 2024-05-16 NOTE — TELEPHONE ENCOUNTER
Rx Refill Note  Requested Prescriptions     Pending Prescriptions Disp Refills    clopidogrel (PLAVIX) 75 MG tablet [Pharmacy Med Name: Clopidogrel Bisulfate 75 MG Oral Tablet] 90 tablet 0     Sig: Take 1 tablet by mouth once daily      Last office visit with prescribing clinician: 4/2/2024   Last telemedicine visit with prescribing clinician: Visit date not found   Next office visit with prescribing clinician: 6/25/2024                         Would you like a call back once the refill request has been completed: [] Yes [] No    If the office needs to give you a call back, can they leave a voicemail: [] Yes [] No    Malu Banuelos MA  05/16/24, 08:45 EDT

## 2024-05-29 DIAGNOSIS — I63.9 ACUTE CVA (CEREBROVASCULAR ACCIDENT): ICD-10-CM

## 2024-05-30 RX ORDER — ATORVASTATIN CALCIUM 80 MG/1
80 TABLET, FILM COATED ORAL NIGHTLY
Qty: 90 TABLET | Refills: 0 | Status: SHIPPED | OUTPATIENT
Start: 2024-05-30

## 2024-06-25 ENCOUNTER — OFFICE VISIT (OUTPATIENT)
Dept: FAMILY MEDICINE CLINIC | Facility: CLINIC | Age: 59
End: 2024-06-25
Payer: COMMERCIAL

## 2024-06-25 VITALS
OXYGEN SATURATION: 98 % | BODY MASS INDEX: 27.45 KG/M2 | DIASTOLIC BLOOD PRESSURE: 76 MMHG | WEIGHT: 160.8 LBS | SYSTOLIC BLOOD PRESSURE: 122 MMHG | HEIGHT: 64 IN | HEART RATE: 78 BPM

## 2024-06-25 DIAGNOSIS — Z00.00 ANNUAL PHYSICAL EXAM: ICD-10-CM

## 2024-06-25 DIAGNOSIS — E11.29 TYPE 2 DIABETES MELLITUS WITH DIABETIC MICROALBUMINURIA, WITHOUT LONG-TERM CURRENT USE OF INSULIN: Primary | ICD-10-CM

## 2024-06-25 DIAGNOSIS — R80.9 TYPE 2 DIABETES MELLITUS WITH DIABETIC MICROALBUMINURIA, WITHOUT LONG-TERM CURRENT USE OF INSULIN: Primary | ICD-10-CM

## 2024-06-25 DIAGNOSIS — F41.9 ANXIETY AND DEPRESSION: ICD-10-CM

## 2024-06-25 DIAGNOSIS — E78.5 DYSLIPIDEMIA: ICD-10-CM

## 2024-06-25 DIAGNOSIS — E66.3 OVERWEIGHT (BMI 25.0-29.9): ICD-10-CM

## 2024-06-25 DIAGNOSIS — F32.A ANXIETY AND DEPRESSION: ICD-10-CM

## 2024-06-25 PROCEDURE — 99214 OFFICE O/P EST MOD 30 MIN: CPT | Performed by: INTERNAL MEDICINE

## 2024-06-25 PROCEDURE — 99396 PREV VISIT EST AGE 40-64: CPT | Performed by: INTERNAL MEDICINE

## 2024-06-25 RX ORDER — BLOOD-GLUCOSE METER
1 KIT MISCELLANEOUS DAILY
Qty: 1 EACH | Refills: 2 | Status: SHIPPED | OUTPATIENT
Start: 2024-06-25

## 2024-06-25 RX ORDER — TERAZOSIN 5 MG/1
5 CAPSULE ORAL NIGHTLY
COMMUNITY
Start: 2024-04-21

## 2024-06-25 RX ORDER — LANCETS 28 GAUGE
EACH MISCELLANEOUS
Qty: 100 EACH | Refills: 12 | Status: SHIPPED | OUTPATIENT
Start: 2024-06-25

## 2024-06-25 NOTE — ASSESSMENT & PLAN NOTE
Currently improved on intensive statin dosing with 80 mg of Lipitor given her history of stroke.  Fasting lipid profile today.  Mediterranean diet

## 2024-06-25 NOTE — PROGRESS NOTES
Rosanne Araya  1965  [id number]  Patient Care Team:  Neftali Mccain MD as PCP - General (Internal Medicine)  Gio Julian MD as Consulting Physician (Neurology)  Royal Lopez MD as Gynecologist (Gynecology)  Omer Hernandez MD as Consulting Physician (Dermatology)  Addie Mckeon PA-C as Physician Assistant (Endocrinology)  Marivel Becker APRN as Nurse Practitioner (Cardiology)    Rosanne Araya is a 58 y.o. female here today for annual exam.  This patient is accompanied by their self who contributes to the history of their care.    Chief Complaint:    Chief Complaint   Patient presents with    Annual Exam       History of Present Illness:   Ms. Araya is here for annual exam in addition to being here for diabetes/HTN/lipid follow up.  Has been back on Ozempic at 1 mg weekly   She walks 2 miles daily She is off glipizide.  Had diarrhea initially. She likes the results. Successful weight loss seems plateau . No hypoglycemia. Current with optho  She denies poly uria or dipsia. She did increase her jardiance to 20 mg with improved glycemic control. She needs a new meter  She does check bp at home in normal range- has decreased to 1 terasozin at night secondary to dec bp. Occasional lightheadedness  noted with weight loss. She still struggles  getting her fluid in since being o ozempic.  She continues on Lipitor intensive dosing at 80 mg daily without any muscle aches or dark-colored urine.  Cholesterol is at goal last summer.  Have a positive microalbumin a 2 visits ago Patient is on Cozaar.  Pressures have been uncontrolled at home.    . Remains on plavix for cva prophylaxis.  Her screenings are up-to-date.    Past Medical History:   Diagnosis Date    Depression     Diabetes mellitus     Herpes     Hypertension     IUD (intrauterine device) in place     due to menorrhagia    Stroke (cerebrum)        Past Surgical History:   Procedure Laterality Date    ADENOIDECTOMY  1976    BACK SURGERY       disc slipped    CARDIAC ELECTROPHYSIOLOGY PROCEDURE N/A 2021    Procedure: Loop insertion- getting c19 elsewhere in [place] and aware to do on  and bring results;  Surgeon: Adonis Eng IV, MD;  Location: [facility] INVASIVE LOCATION;  Service: Cardiovascular;  Laterality: N/A;     SECTION      CHOLECYSTECTOMY      GALLBLADDER SURGERY      TONSILLECTOMY AND ADENOIDECTOMY      TUBAL ABDOMINAL LIGATION  2009    WISDOM TOOTH EXTRACTION          Family History   Problem Relation Age of Onset    Thyroid disease Mother     Breast cancer Maternal Aunt 45    Hypertension Maternal Grandmother     Diabetes Maternal Grandmother     Stroke Maternal Grandmother     Heart attack Maternal Grandmother     Hypertension Maternal Grandfather     Diabetes Maternal Grandfather     Hypertension Paternal Grandmother     Diabetes Paternal Grandmother     Heart failure Paternal Grandmother     Hypertension Paternal Grandfather     Heart attack Paternal Grandfather     Ovarian cancer Neg Hx        Social History     Socioeconomic History    Marital status:    Tobacco Use    Smoking status: Former     Current packs/day: 0.00     Average packs/day: 0.3 packs/day for 9.0 years (2.3 ttl pk-yrs)     Types: Cigarettes     Start date: 1977     Quit date: 1986     Years since quittin.5    Smokeless tobacco: Never   Vaping Use    Vaping status: Never Used   Substance and Sexual Activity    Alcohol use: Not Currently    Drug use: Not Currently     Types: Marijuana     Comment: once weekly, last use 22    Sexual activity: Yes     Partners: Male     Birth control/protection: I.U.D.       Allergies   Allergen Reactions    Erythromycin Rash       Depression: PHQ-2 Depression Screening  Little interest or pleasure in doing things?  0   Feeling down, depressed, or hopeless?  0   PHQ-2 Total Score  0      Immunization History   Administered Date(s) Administered    COVID-19 (MODERNA)  1st,2nd,3rd Dose Monovalent 10/06/2021    COVID-19 (PFIZER) BIVALENT 12+YRS 09/29/2022    COVID-19 (PFIZER) Purple Cap Monovalent 01/20/2021, 02/09/2021    COVID-19 F23 (MODERNA) 12YRS+ (SPIKEVAX) 12/04/2023    Flublock Quad =>18yrs 10/29/2020    Fluzone (or Fluarix & Flulaval for VFC) >6mos 09/29/2022, 10/05/2023    Hepatitis A 02/26/2020    Hepatitis B 02/16/2000    Influenza Injectable Mdck Pf Quad 09/29/2022    Influenza, Unspecified 11/01/2008, 09/16/2009    Pneumococcal Conjugate 20-Valent (PCV20) 01/08/2024    Pneumococcal Polysaccharide (PPSV23) 01/24/2017, 08/05/2020    Shingrix 05/25/2023, 12/04/2023    Tdap 10/12/2010, 02/26/2020       Intimate partner violence ( Screen on initial visit, pregnant women, women with injuries, older adult with injury or evidence of neglect):  -Violence can be a problem in many people's lives, so I now ask every patient about trauma or abuse they may have experienced in a relationship.   Stress/Safety - Do you feel safe in your relationship? n   Afraid/Abused - Have you ever been in a relationship where you were threatened, hurt, or afraid? n   Friend/Family - Are your friends aware you have been hurt?n   Emergency Plan - Do you have a safe place to go and the resources you need in an emergency?n    Review of Systems:    Review of Systems   Constitutional:  Negative for chills, fatigue, fever, unexpected weight gain and unexpected weight loss.   HENT:  Negative for ear pain, postnasal drip and sore throat.    Eyes:  Negative for blurred vision and visual disturbance.   Respiratory:  Negative for cough, shortness of breath and wheezing.    Cardiovascular:  Negative for chest pain and palpitations.   Gastrointestinal:  Negative for abdominal pain, diarrhea, nausea and vomiting.   Endocrine: Negative for cold intolerance, heat intolerance, polydipsia and polyuria.   Genitourinary:  Negative for dysuria, flank pain and hematuria.   Neurological:  Negative for weakness, numbness  "and headache.   Psychiatric/Behavioral:  Negative for depressed mood.        Vitals:    06/25/24 1000   BP: 122/76   Pulse: 78   SpO2: 98%   Weight: 72.9 kg (160 lb 12.8 oz)   Height: 162.6 cm (64.02\")     Body mass index is 27.59 kg/m².      Current Outpatient Medications:     atorvastatin (LIPITOR) 80 MG tablet, TAKE 1 TABLET BY MOUTH ONCE DAILY AT NIGHT, Disp: 90 tablet, Rfl: 0    carvedilol (COREG) 12.5 MG tablet, Take 1 tablet by mouth 2 (Two) Times a Day., Disp: 180 tablet, Rfl: 3    clopidogrel (PLAVIX) 75 MG tablet, Take 1 tablet by mouth once daily, Disp: 90 tablet, Rfl: 0    dilTIAZem CD (CARDIZEM CD) 300 MG 24 hr capsule, Take 1 capsule by mouth once daily, Disp: 90 capsule, Rfl: 0    losartan (COZAAR) 100 MG tablet, Take 1 tablet by mouth Daily., Disp: 90 tablet, Rfl: 3    metFORMIN (GLUCOPHAGE) 1000 MG tablet, Take 1 tablet by mouth 2 (Two) Times a Day With Meals., Disp: 180 tablet, Rfl: 3    ondansetron (Zofran) 4 MG tablet, Take 1 tablet by mouth Every 8 (Eight) Hours As Needed for Nausea or Vomiting., Disp: 60 tablet, Rfl: 1    pioglitazone (Actos) 15 MG tablet, Take 1 tablet by mouth Daily., Disp: 90 tablet, Rfl: 3    Semaglutide, 1 MG/DOSE, (Ozempic, 1 MG/DOSE,) 4 MG/3ML solution pen-injector, Inject 1 mg under the skin into the appropriate area as directed 1 (One) Time Per Week., Disp: 3 mL, Rfl: 9    Semaglutide,0.25 or 0.5MG/DOS, (Ozempic, 0.25 or 0.5 MG/DOSE,) 2 MG/3ML solution pen-injector, Inject 0.5 mg under the skin into the appropriate area as directed 1 (One) Time Per Week., Disp: 9 mL, Rfl: 9    terazosin (HYTRIN) 5 MG capsule, Take 1 capsule by mouth Every Night., Disp: , Rfl:     venlafaxine XR (EFFEXOR-XR) 75 MG 24 hr capsule, Take 3 capsules by mouth Daily., Disp: 90 capsule, Rfl: 1    empagliflozin (Jardiance) 25 MG tablet tablet, Take 1 tablet by mouth Daily., Disp: 90 tablet, Rfl: 1    glucose blood test strip, Use as instructed, Disp: 100 each, Rfl: 12    glucose monitor " monitoring kit, Use 1 each Daily., Disp: 1 each, Rfl: 2    Lancets (freestyle) lancets, Test daily, Disp: 100 each, Rfl: 12    Physical Exam:    Physical Exam  Vitals and nursing note reviewed.   Constitutional:       General: She is not in acute distress.     Appearance: She is well-developed. She is not diaphoretic.   HENT:      Head: Normocephalic and atraumatic.      Right Ear: External ear normal.      Left Ear: External ear normal.      Mouth/Throat:      Pharynx: No oropharyngeal exudate.   Eyes:      General: No scleral icterus.        Right eye: No discharge.      Conjunctiva/sclera: Conjunctivae normal.   Neck:      Thyroid: No thyromegaly.      Vascular: No JVD.      Trachea: No tracheal deviation.   Cardiovascular:      Rate and Rhythm: Normal rate and regular rhythm.      Heart sounds: Normal heart sounds.      Comments: PMI nondisplaced  Pulmonary:      Effort: Pulmonary effort is normal.      Breath sounds: Normal breath sounds. No wheezing or rales.   Abdominal:      General: Bowel sounds are normal.      Palpations: Abdomen is soft.      Tenderness: There is no abdominal tenderness. There is no guarding or rebound.   Musculoskeletal:      Cervical back: Normal range of motion and neck supple.   Lymphadenopathy:      Cervical: No cervical adenopathy.   Skin:     General: Skin is warm and dry.      Capillary Refill: Capillary refill takes less than 2 seconds.      Coloration: Skin is not pale.      Findings: No rash.   Neurological:      Mental Status: She is alert and oriented to person, place, and time.      Motor: No abnormal muscle tone.      Coordination: Coordination normal.   Psychiatric:         Mood and Affect: Mood normal.         Behavior: Behavior normal.         Judgment: Judgment normal.         Procedures    Results Review:    None    Assessment/Plan:    Problem List Items Addressed This Visit       Type 2 diabetes mellitus with diabetic microalbuminuria, without long-term current use  of insulin - Primary    Current Assessment & Plan     Diabetes is improving with treatment.   Medication changes per orders.  Recommended a Mediterranean style of eating  Discussed foot care.  Reminded to get yearly retinal exam.  Diabetes will be reassessed in 3 months this visit we changed her Jardiance from 10 mg to 25 mg daily continue rest of medications.         Relevant Medications    pioglitazone (Actos) 15 MG tablet    metFORMIN (GLUCOPHAGE) 1000 MG tablet    Semaglutide,0.25 or 0.5MG/DOS, (Ozempic, 0.25 or 0.5 MG/DOSE,) 2 MG/3ML solution pen-injector    Semaglutide, 1 MG/DOSE, (Ozempic, 1 MG/DOSE,) 4 MG/3ML solution pen-injector    empagliflozin (Jardiance) 25 MG tablet tablet    glucose monitor monitoring kit    glucose blood test strip    Lancets (freestyle) lancets    Dyslipidemia    Current Assessment & Plan     Currently improved on intensive statin dosing with 80 mg of Lipitor given her history of stroke.  Fasting lipid profile today.  Mediterranean diet         Relevant Orders    Lipid Panel    Anxiety and depression    Current Assessment & Plan     Patient's depression is a single episode that is mild without psychosis. Depression is in full remission and stable.    Plan:   Continue current medication therapy     Followup in 6 months.  Recommended possible c CO-counseling with her son.         Relevant Medications    venlafaxine XR (EFFEXOR-XR) 75 MG 24 hr capsule    Annual physical exam    Overweight (BMI 25.0-29.9)    Current Assessment & Plan     Patient's (Body mass index is 27.59 kg/m².) indicates that they are overweight with health conditions that include hypertension, diabetes mellitus, and dyslipidemias . Weight is improving with treatment. BMI is is above average; no BMI management plan is appropriate. We discussed low calorie, low carb based diet program, portion control, and increasing exercise.  Continue Ozempic            Plan of care was reviewed with patient at the conclusion of  today's visit. Counseled patient with regards to good nutrition and diet. Maintaining a healthy lifestyle including exercise and physical activities. Spoke with patient on ways to reduce stress, getting adequate sleep and injury prevention.  Discussed mammogram, colon cancer screening, osteoporosis and pap smear including benefit of early detection and potential need for follow-up. Patient agrees to screening mammogram, colonoscopy, bone density and gyn referral today. Annual dental and eye exams were encouraged. Encouraged patient to continue to follow up with annual immunizations.     Return in about 3 months (around 9/25/2024) for dm.    Neftali Mccain MD    Please note than portions of this note were completed wth a Voice Recognition Program

## 2024-06-25 NOTE — ASSESSMENT & PLAN NOTE
Patient's (Body mass index is 27.59 kg/m².) indicates that they are overweight with health conditions that include hypertension, diabetes mellitus, and dyslipidemias . Weight is improving with treatment. BMI is is above average; no BMI management plan is appropriate. We discussed low calorie, low carb based diet program, portion control, and increasing exercise.  Continue Ozempic

## 2024-06-25 NOTE — ASSESSMENT & PLAN NOTE
Patient's depression is a single episode that is mild without psychosis. Depression is in full remission and stable.    Plan:   Continue current medication therapy     Followup in 6 months.  Recommended possible c CO-counseling with her son.

## 2024-06-25 NOTE — ASSESSMENT & PLAN NOTE
Diabetes is improving with treatment.   Medication changes per orders.  Recommended a Mediterranean style of eating  Discussed foot care.  Reminded to get yearly retinal exam.  Diabetes will be reassessed in 3 months this visit we changed her Jardiance from 10 mg to 25 mg daily continue rest of medications.

## 2024-07-08 RX ORDER — DILTIAZEM HYDROCHLORIDE 300 MG/1
300 CAPSULE, COATED, EXTENDED RELEASE ORAL DAILY
Qty: 90 CAPSULE | Refills: 0 | Status: SHIPPED | OUTPATIENT
Start: 2024-07-08

## 2024-07-10 ENCOUNTER — TELEPHONE (OUTPATIENT)
Dept: CARDIOLOGY | Facility: CLINIC | Age: 59
End: 2024-07-10

## 2024-07-10 NOTE — TELEPHONE ENCOUNTER
Name: Rosanne Araya    Relationship: Self    Best Callback Number: [phone]    Incoming call to the Hub, requesting to  Reschedule their Device Check appointment on 7.10.24.     Per Hub workflow, further review is needed before the task can be completed.    Result of Call: Transferred to GERTRUDE at the practice     
Yes

## 2024-07-13 DIAGNOSIS — I63.9 ACUTE CVA (CEREBROVASCULAR ACCIDENT): ICD-10-CM

## 2024-07-15 RX ORDER — CLOPIDOGREL BISULFATE 75 MG/1
75 TABLET ORAL DAILY
Qty: 90 TABLET | Refills: 0 | Status: SHIPPED | OUTPATIENT
Start: 2024-07-15

## 2024-07-30 ENCOUNTER — TELEPHONE (OUTPATIENT)
Dept: FAMILY MEDICINE CLINIC | Facility: CLINIC | Age: 59
End: 2024-07-30
Payer: COMMERCIAL

## 2024-07-30 NOTE — TELEPHONE ENCOUNTER
Spoke with patient to let her know she has active lab work. Patient was not aware that she had active orders, and thanked me for the call. I did inform the patient that the labs were fasting, but to drink water and take any medication as prescribed before coming to have labs drawn.

## 2024-09-03 DIAGNOSIS — I63.9 ACUTE CVA (CEREBROVASCULAR ACCIDENT): ICD-10-CM

## 2024-09-03 RX ORDER — ATORVASTATIN CALCIUM 80 MG/1
80 TABLET, FILM COATED ORAL NIGHTLY
Qty: 90 TABLET | Refills: 0 | Status: SHIPPED | OUTPATIENT
Start: 2024-09-03

## 2024-09-23 ENCOUNTER — TRANSCRIBE ORDERS (OUTPATIENT)
Dept: FAMILY MEDICINE CLINIC | Facility: CLINIC | Age: 59
End: 2024-09-23
Payer: COMMERCIAL

## 2024-09-23 DIAGNOSIS — Z12.31 VISIT FOR SCREENING MAMMOGRAM: Primary | ICD-10-CM

## 2024-09-25 ENCOUNTER — OFFICE VISIT (OUTPATIENT)
Dept: FAMILY MEDICINE CLINIC | Facility: CLINIC | Age: 59
End: 2024-09-25
Payer: COMMERCIAL

## 2024-09-25 ENCOUNTER — LAB (OUTPATIENT)
Dept: LAB | Facility: HOSPITAL | Age: 59
End: 2024-09-25
Payer: COMMERCIAL

## 2024-09-25 VITALS
SYSTOLIC BLOOD PRESSURE: 122 MMHG | OXYGEN SATURATION: 97 % | BODY MASS INDEX: 26.46 KG/M2 | WEIGHT: 155 LBS | DIASTOLIC BLOOD PRESSURE: 76 MMHG | HEART RATE: 69 BPM | HEIGHT: 64 IN

## 2024-09-25 DIAGNOSIS — E78.5 DYSLIPIDEMIA: ICD-10-CM

## 2024-09-25 DIAGNOSIS — E11.29 TYPE 2 DIABETES MELLITUS WITH DIABETIC MICROALBUMINURIA, WITHOUT LONG-TERM CURRENT USE OF INSULIN: Primary | ICD-10-CM

## 2024-09-25 DIAGNOSIS — R80.9 TYPE 2 DIABETES MELLITUS WITH DIABETIC MICROALBUMINURIA, WITHOUT LONG-TERM CURRENT USE OF INSULIN: Primary | ICD-10-CM

## 2024-09-25 DIAGNOSIS — R80.9 TYPE 2 DIABETES MELLITUS WITH DIABETIC MICROALBUMINURIA, WITHOUT LONG-TERM CURRENT USE OF INSULIN: ICD-10-CM

## 2024-09-25 DIAGNOSIS — E11.29 TYPE 2 DIABETES MELLITUS WITH DIABETIC MICROALBUMINURIA, WITHOUT LONG-TERM CURRENT USE OF INSULIN: ICD-10-CM

## 2024-09-25 DIAGNOSIS — I10 ESSENTIAL HYPERTENSION: ICD-10-CM

## 2024-09-25 LAB
EXPIRATION DATE: ABNORMAL
HBA1C MFR BLD: 6.7 % (ref 4.5–5.7)
Lab: ABNORMAL

## 2024-09-25 PROCEDURE — 80061 LIPID PANEL: CPT

## 2024-09-25 PROCEDURE — 83036 HEMOGLOBIN GLYCOSYLATED A1C: CPT | Performed by: INTERNAL MEDICINE

## 2024-09-25 PROCEDURE — 80053 COMPREHEN METABOLIC PANEL: CPT

## 2024-09-25 PROCEDURE — 99214 OFFICE O/P EST MOD 30 MIN: CPT | Performed by: INTERNAL MEDICINE

## 2024-09-25 RX ORDER — TERAZOSIN 1 MG/1
1 CAPSULE ORAL 2 TIMES DAILY
Qty: 180 CAPSULE | Refills: 1 | Status: SHIPPED | OUTPATIENT
Start: 2024-09-25

## 2024-09-26 LAB
ALBUMIN SERPL-MCNC: 4.4 G/DL (ref 3.5–5.2)
ALBUMIN/GLOB SERPL: 1.4 G/DL
ALP SERPL-CCNC: 82 U/L (ref 39–117)
ALT SERPL W P-5'-P-CCNC: 12 U/L (ref 1–33)
ANION GAP SERPL CALCULATED.3IONS-SCNC: 11.4 MMOL/L (ref 5–15)
AST SERPL-CCNC: 27 U/L (ref 1–32)
BILIRUB SERPL-MCNC: 0.6 MG/DL (ref 0–1.2)
BUN SERPL-MCNC: 16 MG/DL (ref 6–20)
BUN/CREAT SERPL: 18.4 (ref 7–25)
CALCIUM SPEC-SCNC: 10.3 MG/DL (ref 8.6–10.5)
CHLORIDE SERPL-SCNC: 101 MMOL/L (ref 98–107)
CHOLEST SERPL-MCNC: 148 MG/DL (ref 0–200)
CO2 SERPL-SCNC: 26.6 MMOL/L (ref 22–29)
CREAT SERPL-MCNC: 0.87 MG/DL (ref 0.57–1)
EGFRCR SERPLBLD CKD-EPI 2021: 78.3 ML/MIN/1.73
GLOBULIN UR ELPH-MCNC: 3.1 GM/DL
GLUCOSE SERPL-MCNC: 151 MG/DL (ref 65–99)
HDLC SERPL-MCNC: 48 MG/DL (ref 40–60)
LDLC SERPL CALC-MCNC: 78 MG/DL (ref 0–100)
LDLC/HDLC SERPL: 1.56 {RATIO}
POTASSIUM SERPL-SCNC: 4.1 MMOL/L (ref 3.5–5.2)
PROT SERPL-MCNC: 7.5 G/DL (ref 6–8.5)
SODIUM SERPL-SCNC: 139 MMOL/L (ref 136–145)
TRIGL SERPL-MCNC: 125 MG/DL (ref 0–150)
VLDLC SERPL-MCNC: 22 MG/DL (ref 5–40)

## 2024-10-02 RX ORDER — DILTIAZEM HYDROCHLORIDE 300 MG/1
300 CAPSULE, COATED, EXTENDED RELEASE ORAL DAILY
Qty: 90 CAPSULE | Refills: 0 | Status: SHIPPED | OUTPATIENT
Start: 2024-10-02

## 2024-10-02 NOTE — TELEPHONE ENCOUNTER
Rx Refill Note  Requested Prescriptions     Pending Prescriptions Disp Refills    dilTIAZem CD (CARDIZEM CD) 300 MG 24 hr capsule [Pharmacy Med Name: dilTIAZem HCl ER Coated Beads 300 MG Oral Capsule Extended Release 24 Hour] 90 capsule 0     Sig: Take 1 capsule by mouth once daily      Last office visit with prescribing clinician: 9/25/2024   Last telemedicine visit with prescribing clinician: Visit date not found   Next office visit with prescribing clinician: 1/27/2025                         Would you like a call back once the refill request has been completed: [] Yes [] No    If the office needs to give you a call back, can they leave a voicemail: [] Yes [] No    Malu Banuelos MA  10/02/24, 10:21 EDT

## 2024-10-24 ENCOUNTER — TELEPHONE (OUTPATIENT)
Dept: FAMILY MEDICINE CLINIC | Facility: CLINIC | Age: 59
End: 2024-10-24
Payer: COMMERCIAL

## 2024-10-24 ENCOUNTER — PRIOR AUTHORIZATION (OUTPATIENT)
Dept: FAMILY MEDICINE CLINIC | Facility: CLINIC | Age: 59
End: 2024-10-24
Payer: COMMERCIAL

## 2024-10-24 RX ORDER — CARVEDILOL 12.5 MG/1
12.5 TABLET ORAL 2 TIMES DAILY
Qty: 180 TABLET | Refills: 0 | Status: SHIPPED | OUTPATIENT
Start: 2024-10-24

## 2024-10-24 NOTE — TELEPHONE ENCOUNTER
Rx Refill Note  Requested Prescriptions     Pending Prescriptions Disp Refills    carvedilol (COREG) 12.5 MG tablet [Pharmacy Med Name: Carvedilol 12.5 MG Oral Tablet] 180 tablet 0     Sig: Take 1 tablet by mouth twice daily      Last office visit with prescribing clinician: 9/25/2024   Last telemedicine visit with prescribing clinician: Visit date not found   Next office visit with prescribing clinician: 1/27/2025                         Would you like a call back once the refill request has been completed: [] Yes [] No    If the office needs to give you a call back, can they leave a voicemail: [] Yes [] No    April SHAYY Mark  10/24/24, 12:29 EDT

## 2024-10-24 NOTE — TELEPHONE ENCOUNTER
Pt would like to revisit a dermatology referral - can this be put in or will Dr Mccain need to see her 1st?

## 2024-10-24 NOTE — TELEPHONE ENCOUNTER
(Key: [id number] - [id number]  Ozempic (1 MG/DOSE) 4MG/3ML pen-injectors  status: PA Response - Approved  Created: October 23rd, 2024  Sent: October 24th, 2024    The authorization is valid from 09/24/2024 through 10/24/2025.

## 2024-10-30 DIAGNOSIS — D22.5 MELANOCYTIC NEVUS OF TRUNK: Primary | ICD-10-CM

## 2024-11-08 ENCOUNTER — TELEPHONE (OUTPATIENT)
Dept: FAMILY MEDICINE CLINIC | Facility: CLINIC | Age: 59
End: 2024-11-08
Payer: COMMERCIAL

## 2024-11-26 DIAGNOSIS — I63.9 ACUTE CVA (CEREBROVASCULAR ACCIDENT): ICD-10-CM

## 2024-11-26 RX ORDER — ATORVASTATIN CALCIUM 80 MG/1
80 TABLET, FILM COATED ORAL NIGHTLY
Qty: 90 TABLET | Refills: 0 | Status: SHIPPED | OUTPATIENT
Start: 2024-11-26

## 2024-11-26 NOTE — TELEPHONE ENCOUNTER
Rx Refill Note  Requested Prescriptions     Pending Prescriptions Disp Refills    atorvastatin (LIPITOR) 80 MG tablet [Pharmacy Med Name: Atorvastatin Calcium 80 MG Oral Tablet] 90 tablet 0     Sig: Take 1 tablet by mouth nightly      Last office visit with prescribing clinician: 9/25/2024   Last telemedicine visit with prescribing clinician: Visit date not found   Next office visit with prescribing clinician: 1/27/2025                         Would you like a call back once the refill request has been completed: [] Yes [] No    If the office needs to give you a call back, can they leave a voicemail: [] Yes [] No    Tricia Metzger MA  11/26/24, 14:16 EST

## 2024-12-08 DIAGNOSIS — I63.9 ACUTE CVA (CEREBROVASCULAR ACCIDENT): ICD-10-CM

## 2024-12-10 RX ORDER — ATORVASTATIN CALCIUM 80 MG/1
80 TABLET, FILM COATED ORAL NIGHTLY
Qty: 90 TABLET | Refills: 0 | OUTPATIENT
Start: 2024-12-10

## 2024-12-13 ENCOUNTER — OFFICE VISIT (OUTPATIENT)
Dept: FAMILY MEDICINE CLINIC | Facility: CLINIC | Age: 59
End: 2024-12-13
Payer: COMMERCIAL

## 2024-12-13 VITALS
OXYGEN SATURATION: 98 % | SYSTOLIC BLOOD PRESSURE: 128 MMHG | HEART RATE: 73 BPM | WEIGHT: 155 LBS | DIASTOLIC BLOOD PRESSURE: 78 MMHG | HEIGHT: 64 IN | BODY MASS INDEX: 26.46 KG/M2

## 2024-12-13 DIAGNOSIS — E11.29 TYPE 2 DIABETES MELLITUS WITH DIABETIC MICROALBUMINURIA, WITHOUT LONG-TERM CURRENT USE OF INSULIN: ICD-10-CM

## 2024-12-13 DIAGNOSIS — E78.5 DYSLIPIDEMIA: ICD-10-CM

## 2024-12-13 DIAGNOSIS — I10 ESSENTIAL HYPERTENSION: Primary | ICD-10-CM

## 2024-12-13 DIAGNOSIS — R80.9 TYPE 2 DIABETES MELLITUS WITH DIABETIC MICROALBUMINURIA, WITHOUT LONG-TERM CURRENT USE OF INSULIN: ICD-10-CM

## 2024-12-13 DIAGNOSIS — L98.9 SKIN LESION: ICD-10-CM

## 2024-12-13 PROCEDURE — 99214 OFFICE O/P EST MOD 30 MIN: CPT | Performed by: INTERNAL MEDICINE

## 2024-12-13 NOTE — PROGRESS NOTES
"Rosanne Araya  1965  [id number]  Patient Care Team:  Neftali Mccain MD as PCP - General (Internal Medicine)  Gio Julian MD as Consulting Physician (Neurology)  Royal Lopez MD as Gynecologist (Gynecology)  Omer Hernandez MD as Consulting Physician (Dermatology)  Addie Mckeon PA-C as Physician Assistant (Endocrinology)  Marivel Becker APRN as Nurse Practitioner (Cardiology)    Rosanne Araya is a 59 y.o. female here today for follow up.     This patient is accompanied by their self who contributes to the history of their care.    Chief Complaint:    Chief Complaint   Patient presents with    Hypertension        History of Present Illness:  I have reviewed and/or updated the patient's past medical, past surgical, family, social history, problem list and allergies as appropriate.     Rosanne is here to follow-up on hypertension.  She does check bp at home in normal range- has decreased to 1 terasozin at night secondary to dec bp. Occasional lightheadedness  noted with weight loss. She still struggles  getting her fluid in since being o ozempic.  She continues on Lipitor intensive dosing at 80 mg daily.  Cholesterol is at goal last summer.  Have a positive microalbumin a 2 visits ago remains is on Cozaar.   Taking terazosin oonly every other day. Urinating too much on 25 mg Jardiance. Would like to try 1o mg alternating with 20 mg   .She continue to remains on plavix for cva prophylaxis.  She has noted changing She has noted a change in the skin appearance on her lef nareson the left side of her nose.  She required Mohs surgery several years ago for similar occurrence    Review of Systems   Constitutional: Negative.    Respiratory: Negative.     Gastrointestinal: Negative.    Endocrine: Positive for polyuria.       Vitals:    12/13/24 1328   BP: 128/78   Pulse: 73   SpO2: 98%   Weight: 70.3 kg (155 lb)   Height: 162.6 cm (64.02\")     Body mass index is 26.59 kg/m².    Physical " Exam  Vitals and nursing note reviewed.   Constitutional:       General: She is not in acute distress.     Appearance: She is well-developed. She is not diaphoretic.   HENT:      Head: Normocephalic and atraumatic.      Right Ear: External ear normal.      Left Ear: External ear normal.      Mouth/Throat:      Pharynx: No oropharyngeal exudate.   Eyes:      General: No scleral icterus.        Right eye: No discharge.      Conjunctiva/sclera: Conjunctivae normal.   Neck:      Thyroid: No thyromegaly.      Vascular: No JVD.      Trachea: No tracheal deviation.   Cardiovascular:      Rate and Rhythm: Normal rate and regular rhythm.      Heart sounds: Normal heart sounds.      Comments: PMI nondisplaced  Pulmonary:      Effort: Pulmonary effort is normal.      Breath sounds: Normal breath sounds. No wheezing or rales.   Abdominal:      General: Bowel sounds are normal.      Palpations: Abdomen is soft.      Tenderness: There is no abdominal tenderness. There is no guarding or rebound.   Musculoskeletal:      Cervical back: Normal range of motion and neck supple.   Lymphadenopathy:      Cervical: No cervical adenopathy.   Skin:     General: Skin is warm and dry.      Capillary Refill: Capillary refill takes less than 2 seconds.      Coloration: Skin is not pale.      Findings: No rash.   Neurological:      Mental Status: She is alert and oriented to person, place, and time.      Motor: No abnormal muscle tone.      Coordination: Coordination normal.   Psychiatric:         Judgment: Judgment normal.         Procedures    Results Review:    I reviewed the patient's new clinical results.    Assessment/Plan:    Problem List Items Addressed This Visit       Type 2 diabetes mellitus with diabetic microalbuminuria, without long-term current use of insulin    Relevant Medications    Semaglutide,0.25 or 0.5MG/DOS, (Ozempic, 0.25 or 0.5 MG/DOSE,) 2 MG/3ML solution pen-injector    Semaglutide, 1 MG/DOSE, (Ozempic, 1 MG/DOSE,) 4  MG/3ML solution pen-injector    glucose monitor monitoring kit    glucose blood test strip    Lancets (freestyle) lancets    empagliflozin (Jardiance) 10 MG tablet tablet    Essential hypertension - Primary    Relevant Medications    losartan (COZAAR) 100 MG tablet    terazosin (HYTRIN) 1 MG capsule    dilTIAZem CD (CARDIZEM CD) 300 MG 24 hr capsule    carvedilol (COREG) 12.5 MG tablet    Dyslipidemia    Current Assessment & Plan          Currently improved on intensive statin dosing with 80 mg of Lipitor given her history of stroke.  Fasting lipid profile today.  Mediterranean diet             Other Visit Diagnoses       Skin lesion        Relevant Orders    Ambulatory Referral to Dermatology        If she decreased her Jardiance to 10 mg 2 tablets daily.    Plan of care reviewed with patient at the conclusion of today's visit. Education was provided regarding diagnosis and management.  Patient verbalizes understanding of and agreement with management plan.    No follow-ups on file.    Neftali Mccain MD      Please note than portions of this note were completed wt a Voice Recognition Program

## 2024-12-16 LAB
NCCN CRITERIA FLAG: NORMAL
TYRER CUZICK SCORE: 10.2

## 2024-12-31 RX ORDER — DILTIAZEM HYDROCHLORIDE 300 MG/1
300 CAPSULE, EXTENDED RELEASE ORAL DAILY
Qty: 90 CAPSULE | Refills: 0 | Status: SHIPPED | OUTPATIENT
Start: 2024-12-31

## 2025-01-01 DIAGNOSIS — I63.9 ACUTE CVA (CEREBROVASCULAR ACCIDENT): ICD-10-CM

## 2025-01-02 RX ORDER — CLOPIDOGREL BISULFATE 75 MG/1
75 TABLET ORAL DAILY
Qty: 90 TABLET | Refills: 0 | Status: SHIPPED | OUTPATIENT
Start: 2025-01-02

## 2025-01-02 NOTE — TELEPHONE ENCOUNTER
Rx Refill Note  Requested Prescriptions     Pending Prescriptions Disp Refills    clopidogrel (PLAVIX) 75 MG tablet [Pharmacy Med Name: Clopidogrel Bisulfate 75 MG Oral Tablet] 90 tablet 0     Sig: Take 1 tablet by mouth once daily      Last office visit with prescribing clinician: 12/13/2024   Last telemedicine visit with prescribing clinician: Visit date not found   Next office visit with prescribing clinician: 1/27/2025                         Would you like a call back once the refill request has been completed: [] Yes [] No    If the office needs to give you a call back, can they leave a voicemail: [] Yes [] No    Malu Banuelos MA  01/02/25, 08:41 EST

## 2025-01-13 DIAGNOSIS — I10 ESSENTIAL HYPERTENSION: ICD-10-CM

## 2025-01-13 RX ORDER — TERAZOSIN 1 MG/1
1 CAPSULE ORAL 2 TIMES DAILY
Qty: 180 CAPSULE | Refills: 1 | Status: SHIPPED | OUTPATIENT
Start: 2025-01-13

## 2025-01-13 NOTE — TELEPHONE ENCOUNTER
Caller: Marshal Rosanne Bianca    Relationship: Self    Best call back number: [phone]     Requested Prescriptions:   Requested Prescriptions     Pending Prescriptions Disp Refills    terazosin (HYTRIN) 1 MG capsule 180 capsule 1     Sig: Take 1 capsule by mouth 2 (Two) Times a Day.        Pharmacy where request should be sent: [facility] [address] [place] [address] [phone] [facility] [phone]      Last office visit with prescribing clinician: 12/13/2024   Last telemedicine visit with prescribing clinician: Visit date not found   Next office visit with prescribing clinician: 1/27/2025     Additional details provided by patient: PATIENT STATES THAT SHE NEEDS THE 5MG CAPSULES    Does the patient have less than a 3 day supply:  [x] Yes  [] No    Would you like a call back once the refill request has been completed: [] Yes [x] No    If the office needs to give you a call back, can they leave a voicemail: [] Yes [x] No    Argelia Berry Rep   01/13/25 12:10 EST

## 2025-01-20 RX ORDER — SEMAGLUTIDE 1.34 MG/ML
1 INJECTION, SOLUTION SUBCUTANEOUS WEEKLY
Qty: 3 ML | Refills: 9 | Status: SHIPPED | OUTPATIENT
Start: 2025-01-20

## 2025-01-20 RX ORDER — CARVEDILOL 12.5 MG/1
12.5 TABLET ORAL 2 TIMES DAILY
Qty: 180 TABLET | Refills: 0 | Status: SHIPPED | OUTPATIENT
Start: 2025-01-20

## 2025-01-20 NOTE — TELEPHONE ENCOUNTER
Rx Refill Note  Requested Prescriptions     Pending Prescriptions Disp Refills    Semaglutide, 1 MG/DOSE, (Ozempic, 1 MG/DOSE,) 4 MG/3ML solution pen-injector 3 mL 9     Sig: Inject 1 mg under the skin into the appropriate area as directed 1 (One) Time Per Week.      Last office visit with prescribing clinician: 12/13/2024   Last telemedicine visit with prescribing clinician: Visit date not found   Next office visit with prescribing clinician: 3/13/2025                         Would you like a call back once the refill request has been completed: [] Yes [] No    If the office needs to give you a call back, can they leave a voicemail: [] Yes [] No    Tricia Metzger MA  01/20/25, 10:38 EST

## 2025-01-20 NOTE — TELEPHONE ENCOUNTER
Rx Refill Note  Requested Prescriptions     Pending Prescriptions Disp Refills    carvedilol (COREG) 12.5 MG tablet [Pharmacy Med Name: Carvedilol 12.5 MG Oral Tablet] 180 tablet 0     Sig: Take 1 tablet by mouth twice daily      Last office visit with prescribing clinician: 12/13/2024   Last telemedicine visit with prescribing clinician: Visit date not found   Next office visit with prescribing clinician: 1/20/2025                         Would you like a call back once the refill request has been completed: [] Yes [] No    If the office needs to give you a call back, can they leave a voicemail: [] Yes [] No    Malu Banuelos MA  01/20/25, 09:40 EST

## 2025-01-31 DIAGNOSIS — N91.2 AMENORRHEA: ICD-10-CM

## 2025-01-31 NOTE — TELEPHONE ENCOUNTER
Caller: Rosanne Arayane    Relationship: Self    Best call back number: [phone]    Requested Prescriptions:   Requested Prescriptions     Pending Prescriptions Disp Refills    venlafaxine XR (EFFEXOR-XR) 75 MG 24 hr capsule 90 capsule 1     Sig: Take 3 capsules by mouth Daily.        Pharmacy where request should be sent:    [facility] [phone]  Last office visit with prescribing clinician: 12/13/2024   Last telemedicine visit with prescribing clinician: Visit date not found   Next office visit with prescribing clinician: 3/13/2025     Additional details provided by patient: PATIENT IS OUT OF MEDICATION    Does the patient have less than a 3 day supply:  [x] Yes  [] No    Would you like a call back once the refill request has been completed: [] Yes [x] No    If the office needs to give you a call back, can they leave a voicemail: [] Yes [x] No    Argelia Cormier Rep   01/31/25 10:31 EST

## 2025-02-01 RX ORDER — VENLAFAXINE HYDROCHLORIDE 75 MG/1
225 CAPSULE, EXTENDED RELEASE ORAL DAILY
Qty: 90 CAPSULE | Refills: 1 | Status: SHIPPED | OUTPATIENT
Start: 2025-02-01

## 2025-02-01 NOTE — TELEPHONE ENCOUNTER
Rx Refill Note  Requested Prescriptions     Pending Prescriptions Disp Refills    venlafaxine XR (EFFEXOR-XR) 75 MG 24 hr capsule 90 capsule 1     Sig: Take 3 capsules by mouth Daily.      Last office visit with prescribing clinician: 12/13/2024   Last telemedicine visit with prescribing clinician: Visit date not found   Next office visit with prescribing clinician: 3/13/2025                         Would you like a call back once the refill request has been completed: [] Yes [] No    If the office needs to give you a call back, can they leave a voicemail: [] Yes [] No    April SHAYY Mark  02/01/25, 10:47 EST

## 2025-02-22 DIAGNOSIS — I63.9 ACUTE CVA (CEREBROVASCULAR ACCIDENT): ICD-10-CM

## 2025-02-23 DIAGNOSIS — I10 ESSENTIAL HYPERTENSION: ICD-10-CM

## 2025-02-24 RX ORDER — ATORVASTATIN CALCIUM 80 MG/1
80 TABLET, FILM COATED ORAL NIGHTLY
Qty: 90 TABLET | Refills: 0 | Status: SHIPPED | OUTPATIENT
Start: 2025-02-24

## 2025-02-24 RX ORDER — LOSARTAN POTASSIUM 100 MG/1
100 TABLET ORAL DAILY
Qty: 90 TABLET | Refills: 0 | Status: SHIPPED | OUTPATIENT
Start: 2025-02-24

## 2025-03-13 ENCOUNTER — OFFICE VISIT (OUTPATIENT)
Dept: FAMILY MEDICINE CLINIC | Facility: CLINIC | Age: 60
End: 2025-03-13
Payer: COMMERCIAL

## 2025-03-13 VITALS
SYSTOLIC BLOOD PRESSURE: 130 MMHG | DIASTOLIC BLOOD PRESSURE: 82 MMHG | HEART RATE: 66 BPM | OXYGEN SATURATION: 96 % | TEMPERATURE: 98.8 F | WEIGHT: 164 LBS | BODY MASS INDEX: 28.14 KG/M2

## 2025-03-13 DIAGNOSIS — E11.29 TYPE 2 DIABETES MELLITUS WITH DIABETIC MICROALBUMINURIA, WITHOUT LONG-TERM CURRENT USE OF INSULIN: Primary | ICD-10-CM

## 2025-03-13 DIAGNOSIS — I10 ESSENTIAL HYPERTENSION: ICD-10-CM

## 2025-03-13 DIAGNOSIS — E11.29 MICROALBUMINURIA DUE TO TYPE 2 DIABETES MELLITUS: Chronic | ICD-10-CM

## 2025-03-13 DIAGNOSIS — R80.9 TYPE 2 DIABETES MELLITUS WITH DIABETIC MICROALBUMINURIA, WITHOUT LONG-TERM CURRENT USE OF INSULIN: Primary | ICD-10-CM

## 2025-03-13 DIAGNOSIS — E78.5 DYSLIPIDEMIA: ICD-10-CM

## 2025-03-13 DIAGNOSIS — N91.2 AMENORRHEA: ICD-10-CM

## 2025-03-13 DIAGNOSIS — Z86.73 HISTORY OF CVA (CEREBROVASCULAR ACCIDENT): ICD-10-CM

## 2025-03-13 DIAGNOSIS — R80.9 MICROALBUMINURIA DUE TO TYPE 2 DIABETES MELLITUS: Chronic | ICD-10-CM

## 2025-03-13 LAB
EXPIRATION DATE: ABNORMAL
HBA1C MFR BLD: 7.6 % (ref 4.5–5.7)
Lab: ABNORMAL

## 2025-03-13 PROCEDURE — 82570 ASSAY OF URINE CREATININE: CPT | Performed by: INTERNAL MEDICINE

## 2025-03-13 PROCEDURE — 82043 UR ALBUMIN QUANTITATIVE: CPT | Performed by: INTERNAL MEDICINE

## 2025-03-13 RX ORDER — LOSARTAN POTASSIUM 100 MG/1
100 TABLET ORAL DAILY
Qty: 90 TABLET | Refills: 0 | Status: SHIPPED | OUTPATIENT
Start: 2025-03-13

## 2025-03-13 RX ORDER — ESTRADIOL 0.1 MG/G
CREAM VAGINAL
COMMUNITY
Start: 2025-01-10 | End: 2025-03-13 | Stop reason: SDUPTHER

## 2025-03-13 RX ORDER — ONDANSETRON 4 MG/1
4 TABLET, FILM COATED ORAL EVERY 8 HOURS PRN
Qty: 60 TABLET | Refills: 1 | Status: SHIPPED | OUTPATIENT
Start: 2025-03-13

## 2025-03-13 RX ORDER — DILTIAZEM HYDROCHLORIDE 300 MG/1
300 CAPSULE, COATED, EXTENDED RELEASE ORAL DAILY
Qty: 90 CAPSULE | Refills: 0 | Status: SHIPPED | OUTPATIENT
Start: 2025-03-13

## 2025-03-13 RX ORDER — TERAZOSIN 2 MG/1
2 CAPSULE ORAL EVERY 12 HOURS SCHEDULED
COMMUNITY
Start: 2025-02-18

## 2025-03-13 RX ORDER — ATORVASTATIN CALCIUM 80 MG/1
80 TABLET, FILM COATED ORAL NIGHTLY
Qty: 90 TABLET | Refills: 0 | Status: SHIPPED | OUTPATIENT
Start: 2025-03-13

## 2025-03-13 RX ORDER — CLOPIDOGREL BISULFATE 75 MG/1
75 TABLET ORAL DAILY
Qty: 90 TABLET | Refills: 0 | Status: SHIPPED | OUTPATIENT
Start: 2025-03-13

## 2025-03-13 RX ORDER — SEMAGLUTIDE 1.34 MG/ML
1 INJECTION, SOLUTION SUBCUTANEOUS WEEKLY
Qty: 3 ML | Refills: 9 | Status: CANCELLED | OUTPATIENT
Start: 2025-03-13

## 2025-03-13 RX ORDER — VENLAFAXINE HYDROCHLORIDE 75 MG/1
225 CAPSULE, EXTENDED RELEASE ORAL DAILY
Qty: 90 CAPSULE | Refills: 1 | Status: SHIPPED | OUTPATIENT
Start: 2025-03-13

## 2025-03-13 RX ORDER — CARVEDILOL 12.5 MG/1
12.5 TABLET ORAL 2 TIMES DAILY
Qty: 180 TABLET | Refills: 0 | Status: SHIPPED | OUTPATIENT
Start: 2025-03-13

## 2025-03-13 RX ORDER — ESTRADIOL 0.1 MG/G
2 CREAM VAGINAL 2 TIMES WEEKLY
Qty: 60 G | Refills: 2 | Status: SHIPPED | OUTPATIENT
Start: 2025-03-13

## 2025-03-13 NOTE — PROGRESS NOTES
"Rosanne Araya  1965  [id number]  Patient Care Team:  Neftali Mccain MD as PCP - General (Internal Medicine)  Gio Julian MD as Consulting Physician (Neurology)  Royal Lopez MD as Gynecologist (Gynecology)  Omer Hernandez MD as Consulting Physician (Dermatology)  Addie Mckeon PA-C as Physician Assistant (Endocrinology)  Marivel Becker APRN as Nurse Practitioner (Cardiology)    Rosanne Araya is a 59 y.o. female here today for follow up.     This patient is accompanied by their self who contributes to the history of their care.    Chief Complaint:    Chief Complaint   Patient presents with    Type 2 diabetes mellitus with diabetic microalbuminuria, wi     3 mo FU, A1c recheck    Med Refill     All meds        History of Present Illness:  I have reviewed and/or updated the patient's past medical, past surgical, family, social history, problem list and allergies as appropriate.     Rosanne is here for diabetes/HTN/lipid follow up.  Has been back on Ozempic at 1 mg weekly   She walks 2 miles daily She is off glipizide.  Had diarrhea initially. She likes the results. Successful weight loss seems plateau . No hypoglycemia. Current with optho  She denies polyuria or dipsia. She did increase her jardiance to 20 mg with improved glycemic control. She is at her \"feel good weight.  I decreased her Jardiance secondary to excess urination. \"[place] eating 2/2 to stress with son     She does check bp at home in normal range- has decreased to 1 terasozin at night secondary to dec bp. Occasional lightheadedness  noted with weight loss. She still struggles  getting her fluid in since being  1mg oozempic.  She continues on Lipitor intensive dosing at 80 mg daily    Cholesterol quit in September 2023.  Have a positive microalbumin a 2 visits ago remains is on Cozaar.   Taking terazosin oonly every other day. BP controlled at home.   . Remains on plavix for cva prophylaxis      Review of Systems "   Constitutional:  Negative for chills, fatigue and fever.   HENT:  Negative for ear pain, postnasal drip, sinus pressure and sore throat.    Eyes:  Negative for blurred vision, double vision and visual disturbance.   Respiratory:  Negative for cough, shortness of breath and wheezing.    Cardiovascular:  Negative for chest pain and palpitations.   Gastrointestinal:  Negative for abdominal pain, blood in stool, diarrhea, nausea and vomiting.   Endocrine: Negative for cold intolerance, heat intolerance, polydipsia and polyuria.   Genitourinary:  Negative for dysuria, flank pain and hematuria.   Musculoskeletal:  Negative for arthralgias and joint swelling.   Skin:  Negative for dry skin and rash.   Neurological:  Negative for weakness, numbness and headache.   Psychiatric/Behavioral:  Positive for stress. Negative for self-injury, suicidal ideas and depressed mood.        Vitals:    03/13/25 0912   BP: 130/82   BP Location: Left arm   Patient Position: Sitting   Cuff Size: Adult   Pulse: 66   Temp: 98.8 °F (37.1 °C)   TempSrc: Oral   SpO2: 96%   Weight: 74.4 kg (164 lb)     Body mass index is 28.14 kg/m².    Physical Exam  Vitals and nursing note reviewed.   Constitutional:       General: She is not in acute distress.     Appearance: She is well-developed. She is not diaphoretic.   HENT:      Head: Normocephalic and atraumatic.      Right Ear: External ear normal.      Left Ear: External ear normal.      Mouth/Throat:      Pharynx: No oropharyngeal exudate.   Eyes:      General: No scleral icterus.        Right eye: No discharge.      Conjunctiva/sclera: Conjunctivae normal.   Neck:      Thyroid: No thyromegaly.      Vascular: No JVD.      Trachea: No tracheal deviation.   Cardiovascular:      Rate and Rhythm: Normal rate and regular rhythm.      Heart sounds: Normal heart sounds.      Comments: PMI nondisplaced  Pulmonary:      Effort: Pulmonary effort is normal.      Breath sounds: Normal breath sounds. No wheezing  or rales.   Abdominal:      General: Bowel sounds are normal.      Palpations: Abdomen is soft.      Tenderness: There is no abdominal tenderness. There is no guarding or rebound.   Musculoskeletal:      Cervical back: Normal range of motion and neck supple.   Lymphadenopathy:      Cervical: No cervical adenopathy.   Skin:     General: Skin is warm and dry.      Capillary Refill: Capillary refill takes less than 2 seconds.      Coloration: Skin is not pale.      Findings: No rash.   Neurological:      Mental Status: She is alert and oriented to person, place, and time.      Motor: No abnormal muscle tone.      Coordination: Coordination normal.   Psychiatric:         Mood and Affect: Mood normal.         Behavior: Behavior normal.         Judgment: Judgment normal.         Procedures    Results Review:    I reviewed the patient's new clinical results. A1c 7.6     Assessment/Plan:    Problem List Items Addressed This Visit       Type 2 diabetes mellitus with diabetic microalbuminuria, without long-term current use of insulin - Primary    Relevant Medications    glucose monitor monitoring kit    glucose blood test strip    Lancets (freestyle) lancets    Semaglutide, 1 MG/DOSE, (Ozempic, 1 MG/DOSE,) 4 MG/3ML solution pen-injector    losartan (COZAAR) 100 MG tablet    empagliflozin (JARDIANCE) 25 MG tablet tablet    Semaglutide, 2 MG/DOSE, (OZEMPIC) 8 MG/3ML solution pen-injector    Other Relevant Orders    POC Glycosylated Hemoglobin (Hb A1C) (Completed)    Microalbumin / Creatinine Urine Ratio - Urine, Clean Catch    Essential hypertension    Relevant Medications    terazosin (HYTRIN) 2 MG capsule    losartan (COZAAR) 100 MG tablet    dilTIAZem CD (Cartia XT) 300 MG 24 hr capsule    carvedilol (COREG) 12.5 MG tablet    History of CVA (cerebrovascular accident)    Overview   CT head 11/26/2020: No acute intracranial abnormality, age-related changes of the brain including likely chronic right frontal lobe lacunar  infarct   CT cerebral perfusion 11/26/2020: Findings consistent with focal right PCA territory ischemia and larger area of slow flow.  No apparent core infarct.  CTA head/neck (11/26/2020): Truncated appearance of the proximal right posterior cerebral artery consistent with patient's perfusion scan abnormality, potentially 50% or greater distal left cavernous carotid artery stenosis  Echocardiogram (11/27/2020): LVEF 65%, LV wall thickness consistent with concentric hypertrophy, the cardiac valves are anatomically and functionally normal, saline test results negative  Transcranial Doppler (11/27/2020): Normal mean flow velocities of bilateral MCA, terminal ICA.  No Doppler evidence suggestive of PFO  Mobile cardiac outpatient telemetry monitor (12/14/2020): Normal monitor study  Negative hypercoagulable work-up  MRI brain 11/26/2020: Scattered small old vessel ischemic changes, no evidence of recent ischemia  Mild residual left-sided weakness  [facility] 3/11/2021 overnight admission for TIA in the setting of KENYA, dehydration  CT head (3/11/2021):  Stable appearance of the brain with no evidence of acute intracranial abnormality  Transcranial Doppler (3/11/2021): Normal mean flow velocities of bilateral MCA, terminal ICA.  No Doppler evidence suggestive of PFO  MRI brain (3/11/2021): No acute infarction, mild to moderate chronic small vessel ischemic disease findings somewhat advanced for patient age, however, no focal or generalized atrophy  [facility] (4/19/2021):   Loop recorder implant ([facility]), 4/19/2021         Current Assessment & Plan   Continue secondary prophylaxis with Plavix, continue high-dose Lipitor blood pressure and sugar control.         Relevant Medications    terazosin (HYTRIN) 2 MG capsule    atorvastatin (LIPITOR) 80 MG tablet    clopidogrel (PLAVIX) 75 MG tablet    empagliflozin (JARDIANCE) 25 MG tablet tablet    carvedilol (COREG) 12.5 MG tablet    Semaglutide, 2 MG/DOSE, (OZEMPIC) 8 MG/3ML solution  pen-injector    Dyslipidemia    Microalbuminuria due to type 2 diabetes mellitus (Chronic)    Relevant Medications    Semaglutide, 1 MG/DOSE, (Ozempic, 1 MG/DOSE,) 4 MG/3ML solution pen-injector    empagliflozin (JARDIANCE) 25 MG tablet tablet    Semaglutide, 2 MG/DOSE, (OZEMPIC) 8 MG/3ML solution pen-injector     Other Visit Diagnoses         Amenorrhea        Relevant Medications    venlafaxine XR (EFFEXOR-XR) 75 MG 24 hr capsule        If increased her Ozempic given her worsening A1c.  She will double down on her stress eating and resume exercising.  Discussed the possible reemergence of GI symptoms with the change in Ozempic dose.  She will stay on Jardiance 25 mg daily.  No other medicine changes.    Plan of care reviewed with patient at the conclusion of today's visit. Education was provided regarding diagnosis and management.  Patient verbalizes understanding of and agreement with management plan.    Return in about 3 months (around 6/13/2025) for dm .    Neftali Mccain MD      Please note than portions of this note were completed [facility] a Voice Recognition Program

## 2025-03-13 NOTE — ASSESSMENT & PLAN NOTE
Continue secondary prophylaxis with Plavix, continue high-dose Lipitor blood pressure and sugar control.

## 2025-03-14 LAB
ALBUMIN UR-MCNC: 9.1 MG/DL
CREAT UR-MCNC: 45 MG/DL
MICROALBUMIN/CREAT UR: 202.2 MG/G (ref 0–29)

## 2025-04-08 ENCOUNTER — HOSPITAL ENCOUNTER (OUTPATIENT)
Dept: MAMMOGRAPHY | Facility: HOSPITAL | Age: 60
Discharge: HOME | End: 2025-04-08
Admitting: INTERNAL MEDICINE
Payer: COMMERCIAL

## 2025-04-08 DIAGNOSIS — Z12.31 VISIT FOR SCREENING MAMMOGRAM: ICD-10-CM

## 2025-04-08 PROCEDURE — 77063 BREAST TOMOSYNTHESIS BI: CPT

## 2025-04-08 PROCEDURE — 77067 SCR MAMMO BI INCL CAD: CPT

## 2025-04-15 DIAGNOSIS — Z86.73 HISTORY OF CVA (CEREBROVASCULAR ACCIDENT): ICD-10-CM

## 2025-04-15 RX ORDER — CLOPIDOGREL BISULFATE 75 MG/1
75 TABLET ORAL DAILY
Qty: 90 TABLET | Refills: 0 | OUTPATIENT
Start: 2025-04-15

## 2025-04-15 NOTE — TELEPHONE ENCOUNTER
Rx Refill Note  Requested Prescriptions     Pending Prescriptions Disp Refills    clopidogrel (PLAVIX) 75 MG tablet [Pharmacy Med Name: Clopidogrel Bisulfate 75 MG Oral Tablet] 90 tablet 0     Sig: Take 1 tablet by mouth once daily      Last office visit with prescribing clinician: 3/13/2025   Last telemedicine visit with prescribing clinician: Visit date not found   Next office visit with prescribing clinician: 6/13/2025                         Would you like a call back once the refill request has been completed: [] Yes [] No    If the office needs to give you a call back, can they leave a voicemail: [] Yes [] No    Ivis Simmons MA  04/15/25, 15:40 EDT

## 2025-05-26 DIAGNOSIS — N91.2 AMENORRHEA: ICD-10-CM

## 2025-05-27 RX ORDER — VENLAFAXINE HYDROCHLORIDE 75 MG/1
225 CAPSULE, EXTENDED RELEASE ORAL DAILY
Qty: 90 CAPSULE | Refills: 0 | Status: SHIPPED | OUTPATIENT
Start: 2025-05-27

## 2025-05-27 NOTE — TELEPHONE ENCOUNTER
Rx Refill Note  Requested Prescriptions     Pending Prescriptions Disp Refills    venlafaxine XR (EFFEXOR-XR) 75 MG 24 hr capsule [Pharmacy Med Name: Venlafaxine HCl ER 75 MG Oral Capsule Extended Release 24 Hour] 90 capsule 0     Sig: TAKE 3 CAPSULES BY MOUTH ONCE DAILY      Last office visit with prescribing clinician: 3/13/2025   Last telemedicine visit with prescribing clinician: Visit date not found   Next office visit with prescribing clinician: 6/13/2025     Lian Noble MA  05/27/25, 13:51 EDT

## 2025-05-29 DIAGNOSIS — Z86.73 HISTORY OF CVA (CEREBROVASCULAR ACCIDENT): ICD-10-CM

## 2025-05-29 RX ORDER — CLOPIDOGREL BISULFATE 75 MG/1
75 TABLET ORAL DAILY
Qty: 90 TABLET | Refills: 0 | Status: SHIPPED | OUTPATIENT
Start: 2025-05-29

## 2025-06-13 ENCOUNTER — OFFICE VISIT (OUTPATIENT)
Dept: FAMILY MEDICINE CLINIC | Facility: CLINIC | Age: 60
End: 2025-06-13
Payer: COMMERCIAL

## 2025-06-13 ENCOUNTER — PRIOR AUTHORIZATION (OUTPATIENT)
Dept: FAMILY MEDICINE CLINIC | Facility: CLINIC | Age: 60
End: 2025-06-13

## 2025-06-13 VITALS
OXYGEN SATURATION: 98 % | HEART RATE: 66 BPM | BODY MASS INDEX: 28.71 KG/M2 | HEIGHT: 64 IN | SYSTOLIC BLOOD PRESSURE: 128 MMHG | DIASTOLIC BLOOD PRESSURE: 78 MMHG | WEIGHT: 168.2 LBS

## 2025-06-13 DIAGNOSIS — R80.9 MICROALBUMINURIA DUE TO TYPE 2 DIABETES MELLITUS: Chronic | ICD-10-CM

## 2025-06-13 DIAGNOSIS — E11.29 TYPE 2 DIABETES MELLITUS WITH DIABETIC MICROALBUMINURIA, WITHOUT LONG-TERM CURRENT USE OF INSULIN: Primary | ICD-10-CM

## 2025-06-13 DIAGNOSIS — Z86.73 HISTORY OF CVA (CEREBROVASCULAR ACCIDENT): ICD-10-CM

## 2025-06-13 DIAGNOSIS — E11.29 MICROALBUMINURIA DUE TO TYPE 2 DIABETES MELLITUS: Chronic | ICD-10-CM

## 2025-06-13 DIAGNOSIS — E78.5 DYSLIPIDEMIA: ICD-10-CM

## 2025-06-13 DIAGNOSIS — I10 ESSENTIAL HYPERTENSION: ICD-10-CM

## 2025-06-13 DIAGNOSIS — R80.9 TYPE 2 DIABETES MELLITUS WITH DIABETIC MICROALBUMINURIA, WITHOUT LONG-TERM CURRENT USE OF INSULIN: Primary | ICD-10-CM

## 2025-06-13 LAB
EXPIRATION DATE: ABNORMAL
HBA1C MFR BLD: 8.3 % (ref 4.5–5.7)
Lab: ABNORMAL

## 2025-06-13 RX ORDER — METFORMIN HYDROCHLORIDE EXTENDED-RELEASE TABLETS 1000 MG/1
1000 TABLET, FILM COATED, EXTENDED RELEASE ORAL
Qty: 180 TABLET | Refills: 2 | Status: SHIPPED | OUTPATIENT
Start: 2025-06-13 | End: 2025-06-13 | Stop reason: SDUPTHER

## 2025-06-13 RX ORDER — METFORMIN HYDROCHLORIDE EXTENDED-RELEASE TABLETS 1000 MG/1
1000 TABLET, FILM COATED, EXTENDED RELEASE ORAL
Qty: 180 TABLET | Refills: 2 | Status: SHIPPED | OUTPATIENT
Start: 2025-06-13

## 2025-06-13 RX ORDER — TERAZOSIN 2 MG/1
6 CAPSULE ORAL NIGHTLY
Qty: 270 CAPSULE | Refills: 2 | Status: SHIPPED | OUTPATIENT
Start: 2025-06-13

## 2025-06-13 RX ORDER — LOSARTAN POTASSIUM 100 MG/1
100 TABLET ORAL DAILY
Qty: 90 TABLET | Refills: 2 | Status: SHIPPED | OUTPATIENT
Start: 2025-06-13

## 2025-06-13 RX ORDER — DILTIAZEM HYDROCHLORIDE 300 MG/1
1 CAPSULE, EXTENDED RELEASE ORAL DAILY
COMMUNITY
Start: 2025-05-01

## 2025-06-13 NOTE — PROGRESS NOTES
"Rosanne Araya  1965  [id number]  Patient Care Team:  Neftali Mccain MD as PCP - General (Internal Medicine)  Gio Julian MD as Consulting Physician (Neurology)  Royal Lopez MD as Gynecologist (Gynecology)  Omer Hernandez MD as Consulting Physician (Dermatology)  Addie Mckeon PA-C as Physician Assistant (Endocrinology)  Marivel Becker APRN as Nurse Practitioner (Cardiology)    Rosanne Araya is a 59 y.o. female here today for follow up.     This patient is accompanied by their self who contributes to the history of their care.    Chief Complaint:    Chief Complaint   Patient presents with    Diabetes        History of Present Illness:  I have reviewed and/or updated the patient's past medical, past surgical, family, social history, problem list and allergies as appropriate.   I have reviewed and/or updated the patient's past medical, past surgical, family, social history, problem list and allergies as appropriate.      Rosanne is here for diabetes/HTN/lipid follow up.  Last visit we recommended   We had increased Ozempic to 2 mg weekly weekly, however she she has been on 1 mg weekly in attempts to wean down   She walks 2 miles daily She is off glipizide and metformin  Had diarrhea initially. She likes the results. Successful weight loss seems plateau . No hypoglycemia. Current with optho  She denies polyuria or dipsia. She did increase her jardiance to 25 mg with improved glycemic control. She is at her \"feel good weight.  I decreased her Jardiance secondary to excess urination. \"    Ozempic  SE are manageable on  one mg.     She does check bp at home in normal range- has decreased to 1 terasozin at night secondary to dec bp. Occasional lightheadedness  noted with weight loss. Good water intake .  She continues on Lipitor intensive dosing at 80 mg daily    Cholesterol quit in September 2023.  Have a positive microalbumin a 3 visits ago remains is on Cozaar.   Taking terazosin 6 mg at " "Hs  BP controlled at home.   . Remains on plavix for cva secondary stroke prevention.  Effexor has been decreased to 75 mg daily.      Review of Systems   Constitutional:  Negative for fatigue, fever and unexpected weight gain.   Respiratory:  Negative for shortness of breath, wheezing and stridor.    Cardiovascular:  Negative for chest pain, palpitations and leg swelling.   Neurological: Negative.    Hematological: Negative.        Vitals:    06/13/25 1032   BP: 128/78   Pulse: 66   SpO2: 98%   Weight: 76.3 kg (168 lb 3.2 oz)   Height: 162.6 cm (64.02\")     Body mass index is 28.86 kg/m².    Physical Exam  Vitals reviewed.   Constitutional:       Appearance: She is well-developed.   HENT:      Head: Normocephalic and atraumatic.   Eyes:      General:         Right eye: No discharge.         Left eye: No discharge.      Conjunctiva/sclera: Conjunctivae normal.   Cardiovascular:      Rate and Rhythm: Normal rate and regular rhythm.   Pulmonary:      Effort: Pulmonary effort is normal.      Breath sounds: Normal breath sounds. No wheezing or rales.   Chest:      Chest wall: No tenderness.   Abdominal:      General: Bowel sounds are normal.      Palpations: Abdomen is soft.   Genitourinary:     Comments: No CVA tendernesss   Skin:     General: Skin is warm and dry.   Neurological:      Mental Status: She is alert and oriented to person, place, and time.         Procedures    Results Review:    I reviewed the patient's new clinical results. A1c 8.3     Assessment/Plan:    Problem List Items Addressed This Visit       Type 2 diabetes mellitus with diabetic microalbuminuria, without long-term current use of insulin - Primary    Relevant Medications    glucose monitor monitoring kit    glucose blood test strip    Lancets (freestyle) lancets    Semaglutide, 1 MG/DOSE, (Ozempic, 1 MG/DOSE,) 4 MG/3ML solution pen-injector    empagliflozin (JARDIANCE) 25 MG tablet tablet    Semaglutide, 2 MG/DOSE, (OZEMPIC) 8 MG/3ML solution " pen-injector    losartan (COZAAR) 100 MG tablet    metFORMIN (FORTAMET) 1000 MG (OSM) 24 hr tablet    Other Relevant Orders    POC Glycosylated Hemoglobin (Hb A1C) (Completed)    Essential hypertension    Relevant Medications    dilTIAZem (TIAZAC) 300 MG 24 hr capsule    losartan (COZAAR) 100 MG tablet    terazosin (HYTRIN) 2 MG capsule    History of CVA (cerebrovascular accident)    Overview   CT head 11/26/2020: No acute intracranial abnormality, age-related changes of the brain including likely chronic right frontal lobe lacunar infarct   CT cerebral perfusion 11/26/2020: Findings consistent with focal right PCA territory ischemia and larger area of slow flow.  No apparent core infarct.  CTA head/neck (11/26/2020): Truncated appearance of the proximal right posterior cerebral artery consistent with patient's perfusion scan abnormality, potentially 50% or greater distal left cavernous carotid artery stenosis  Echocardiogram (11/27/2020): LVEF 65%, LV wall thickness consistent with concentric hypertrophy, the cardiac valves are anatomically and functionally normal, saline test results negative  Transcranial Doppler (11/27/2020): Normal mean flow velocities of bilateral MCA, terminal ICA.  No Doppler evidence suggestive of PFO  Mobile cardiac outpatient telemetry monitor (12/14/2020): Normal monitor study  Negative hypercoagulable work-up  MRI brain 11/26/2020: Scattered small old vessel ischemic changes, no evidence of recent ischemia  Mild residual left-sided weakness  [facility] 3/11/2021 overnight admission for TIA in the setting of KENYA, dehydration  CT head (3/11/2021):  Stable appearance of the brain with no evidence of acute intracranial abnormality  Transcranial Doppler (3/11/2021): Normal mean flow velocities of bilateral MCA, terminal ICA.  No Doppler evidence suggestive of PFO  MRI brain (3/11/2021): No acute infarction, mild to moderate chronic small vessel ischemic disease findings somewhat advanced for  patient age, however, no focal or generalized atrophy  [facility] (4/19/2021):   Loop recorder implant ([facility]), 4/19/2021         Relevant Medications    atorvastatin (LIPITOR) 80 MG tablet    empagliflozin (JARDIANCE) 25 MG tablet tablet    Semaglutide, 2 MG/DOSE, (OZEMPIC) 8 MG/3ML solution pen-injector    clopidogrel (PLAVIX) 75 MG tablet    terazosin (HYTRIN) 2 MG capsule    Dyslipidemia    Current Assessment & Plan   Currently improved on intensive statin dosing with 80 mg of Lipitor given her history of stroke. Fasting lipid profile today. Mediterranean diet          Microalbuminuria due to type 2 diabetes mellitus (Chronic)    Relevant Medications    Semaglutide, 1 MG/DOSE, (Ozempic, 1 MG/DOSE,) 4 MG/3ML solution pen-injector    empagliflozin (JARDIANCE) 25 MG tablet tablet    Semaglutide, 2 MG/DOSE, (OZEMPIC) 8 MG/3ML solution pen-injector    losartan (COZAAR) 100 MG tablet    metFORMIN (FORTAMET) 1000 MG (OSM) 24 hr tablet       Plan of care reviewed with patient at the conclusion of today's visit. Education was provided regarding diagnosis and management.  Patient verbalizes understanding of and agreement with management plan.    Return in about 3 months (around 9/13/2025) for Annual/dm/htn.    Neftali Mccain MD      Please note than portions of this note were completed wth a Voice Recognition Program

## 2025-06-19 ENCOUNTER — PRIOR AUTHORIZATION (OUTPATIENT)
Dept: FAMILY MEDICINE CLINIC | Facility: CLINIC | Age: 60
End: 2025-06-19
Payer: COMMERCIAL

## 2025-06-19 NOTE — TELEPHONE ENCOUNTER
(Key: [id number] - [id number]  metFORMIN HCl ER (OSM) 1000MG er tablets  status: PA RequestCreated: June 13th, 2025 [id number]: June 19th, 2025

## 2025-06-27 ENCOUNTER — TELEPHONE (OUTPATIENT)
Dept: FAMILY MEDICINE CLINIC | Facility: CLINIC | Age: 60
End: 2025-06-27
Payer: COMMERCIAL

## 2025-06-27 DIAGNOSIS — N91.2 AMENORRHEA: ICD-10-CM

## 2025-06-27 RX ORDER — VENLAFAXINE HYDROCHLORIDE 75 MG/1
75 CAPSULE, EXTENDED RELEASE ORAL DAILY
Qty: 90 CAPSULE | Refills: 0 | Status: SHIPPED | OUTPATIENT
Start: 2025-06-27

## 2025-06-27 NOTE — TELEPHONE ENCOUNTER
Rx Refill Note  Requested Prescriptions     Pending Prescriptions Disp Refills    venlafaxine XR (EFFEXOR-XR) 75 MG 24 hr capsule [Pharmacy Med Name: Venlafaxine HCl ER 75 MG Oral Capsule Extended Release 24 Hour] 90 capsule 0     Sig: TAKE 3 CAPSULES BY MOUTH ONCE DAILY      Last office visit with prescribing clinician: 6/13/2025   Last telemedicine visit with prescribing clinician: Visit date not found   Next office visit with prescribing clinician: 9/15/2025                         Would you like a call back once the refill request has been completed: [] Yes [] No    If the office needs to give you a call back, can they leave a voicemail: [] Yes [] No    April SHAYY Mark  06/27/25, 08:51 EDT

## 2025-06-27 NOTE — TELEPHONE ENCOUNTER
Caller: [facility] [address] [place] [address] - [phone]  - [phone]     Relationship: Pharmacy    Best call back number: [phone]     Which medication are you concerned about: venlafaxine XR (EFFEXOR-XR) 75 MG 24 hr capsule     What are your concerns: PHARMACY IS CALLING AND STATES NORMALLY THIS MEDICATION HAS BEEN CALLED IN FOR 3 TIMES A DAY BUT THIS NEW PRESCRIPTION WAS CALLED IN FOR ONLY ONCE A DAY. WOULD LIKE TO VERIFY IF THIS IS CORRECT.

## 2025-07-08 ENCOUNTER — TELEPHONE (OUTPATIENT)
Dept: FAMILY MEDICINE CLINIC | Facility: CLINIC | Age: 60
End: 2025-07-08
Payer: COMMERCIAL

## 2025-07-08 DIAGNOSIS — E11.29 TYPE 2 DIABETES MELLITUS WITH DIABETIC MICROALBUMINURIA, WITHOUT LONG-TERM CURRENT USE OF INSULIN: Primary | ICD-10-CM

## 2025-07-08 DIAGNOSIS — R80.9 TYPE 2 DIABETES MELLITUS WITH DIABETIC MICROALBUMINURIA, WITHOUT LONG-TERM CURRENT USE OF INSULIN: Primary | ICD-10-CM

## 2025-07-08 RX ORDER — METFORMIN HYDROCHLORIDE 500 MG/1
1000 TABLET, EXTENDED RELEASE ORAL
Qty: 60 TABLET | Refills: 5 | Status: SHIPPED | OUTPATIENT
Start: 2025-07-08

## 2025-07-08 NOTE — TELEPHONE ENCOUNTER
Caller: [facility] [address] [place] [address] - [phone] [facility] [phone]     Relationship: Pharmacy    Best call back number: [phone]    Which medication are you concerned about: METFORMIN ER 1000MG    Who prescribed you this medication: DR BUI    What are your concerns: INSURANCE WILL NOT COVER THIS MEDICATION HOWEVER IT WILL COVER THE METFORMIN 500MG ER. CAN THIS BE SWITCHED TO COVERED MEDICATION? PLEASE ADVISE

## 2025-07-10 RX ORDER — DILTIAZEM HYDROCHLORIDE 300 MG/1
300 CAPSULE, EXTENDED RELEASE ORAL DAILY
Qty: 90 CAPSULE | Refills: 0 | Status: SHIPPED | OUTPATIENT
Start: 2025-07-10

## 2025-07-10 NOTE — TELEPHONE ENCOUNTER
Rx Refill Note  Requested Prescriptions     Pending Prescriptions Disp Refills    dilTIAZem (TIAZAC) 300 MG 24 hr capsule [Pharmacy Med Name: dilTIAZem HCl ER Beads 300 MG Oral Capsule Extended Release 24 Hour] 90 capsule 0     Sig: Take 1 capsule by mouth once daily      Last office visit with prescribing clinician: 6/13/2025   Last telemedicine visit with prescribing clinician: Visit date not found   Next office visit with prescribing clinician: 9/15/2025                         Would you like a call back once the refill request has been completed: [] Yes [] No    If the office needs to give you a call back, can they leave a voicemail: [] Yes [] No    Ana Ling MA  07/10/25, 12:46 EDT

## 2025-07-15 DIAGNOSIS — I10 ESSENTIAL HYPERTENSION: ICD-10-CM

## 2025-07-15 DIAGNOSIS — Z86.73 HISTORY OF CVA (CEREBROVASCULAR ACCIDENT): ICD-10-CM

## 2025-07-15 RX ORDER — CARVEDILOL 12.5 MG/1
12.5 TABLET ORAL 2 TIMES DAILY
Qty: 180 TABLET | Refills: 0 | OUTPATIENT
Start: 2025-07-15

## 2025-08-25 DIAGNOSIS — Z86.73 HISTORY OF CVA (CEREBROVASCULAR ACCIDENT): ICD-10-CM

## 2025-08-25 DIAGNOSIS — I10 ESSENTIAL HYPERTENSION: ICD-10-CM

## 2025-08-25 RX ORDER — ATORVASTATIN CALCIUM 80 MG/1
80 TABLET, FILM COATED ORAL NIGHTLY
Qty: 90 TABLET | Refills: 0 | Status: SHIPPED | OUTPATIENT
Start: 2025-08-25

## 2025-08-25 RX ORDER — CARVEDILOL 12.5 MG/1
12.5 TABLET ORAL 2 TIMES DAILY
Qty: 180 TABLET | Refills: 0 | OUTPATIENT
Start: 2025-08-25

## (undated) DEVICE — SHEET,T,THYROID,STERILE: Brand: MEDLINE

## (undated) DEVICE — DEBRIDEMENT KIT: Brand: MEDLINE INDUSTRIES, INC.